# Patient Record
Sex: MALE | Race: WHITE | HISPANIC OR LATINO | Employment: UNEMPLOYED | URBAN - METROPOLITAN AREA
[De-identification: names, ages, dates, MRNs, and addresses within clinical notes are randomized per-mention and may not be internally consistent; named-entity substitution may affect disease eponyms.]

---

## 2020-10-19 ENCOUNTER — OFFICE VISIT (OUTPATIENT)
Dept: FAMILY MEDICINE CLINIC | Facility: CLINIC | Age: 55
End: 2020-10-19

## 2020-10-19 ENCOUNTER — LAB (OUTPATIENT)
Dept: LAB | Facility: HOSPITAL | Age: 55
End: 2020-10-19
Payer: MEDICARE

## 2020-10-19 VITALS
OXYGEN SATURATION: 97 % | DIASTOLIC BLOOD PRESSURE: 86 MMHG | RESPIRATION RATE: 18 BRPM | BODY MASS INDEX: 35.4 KG/M2 | WEIGHT: 239 LBS | HEIGHT: 69 IN | TEMPERATURE: 98.6 F | SYSTOLIC BLOOD PRESSURE: 150 MMHG | HEART RATE: 74 BPM

## 2020-10-19 DIAGNOSIS — G89.29 CHRONIC THORACIC BACK PAIN, UNSPECIFIED BACK PAIN LATERALITY: ICD-10-CM

## 2020-10-19 DIAGNOSIS — Z12.11 ENCOUNTER FOR SCREENING COLONOSCOPY: ICD-10-CM

## 2020-10-19 DIAGNOSIS — I10 ESSENTIAL HYPERTENSION: ICD-10-CM

## 2020-10-19 DIAGNOSIS — M54.6 CHRONIC THORACIC BACK PAIN, UNSPECIFIED BACK PAIN LATERALITY: ICD-10-CM

## 2020-10-19 DIAGNOSIS — Z00.00 ENCOUNTER FOR MEDICAL EXAMINATION TO ESTABLISH CARE: ICD-10-CM

## 2020-10-19 DIAGNOSIS — G47.33 OSA (OBSTRUCTIVE SLEEP APNEA): ICD-10-CM

## 2020-10-19 DIAGNOSIS — Z00.00 ENCOUNTER FOR MEDICAL EXAMINATION TO ESTABLISH CARE: Primary | ICD-10-CM

## 2020-10-19 LAB
ALBUMIN SERPL BCP-MCNC: 4.6 G/DL (ref 3–5.2)
ALP SERPL-CCNC: 78 U/L (ref 43–122)
ALT SERPL W P-5'-P-CCNC: 24 U/L (ref 9–52)
ANION GAP SERPL CALCULATED.3IONS-SCNC: 8 MMOL/L (ref 5–14)
AST SERPL W P-5'-P-CCNC: 23 U/L (ref 17–59)
BASOPHILS # BLD AUTO: 0.1 THOUSANDS/ΜL (ref 0–0.1)
BASOPHILS NFR BLD AUTO: 1 % (ref 0–1)
BILIRUB SERPL-MCNC: 0.6 MG/DL
BUN SERPL-MCNC: 11 MG/DL (ref 5–25)
CALCIUM SERPL-MCNC: 9.8 MG/DL (ref 8.4–10.2)
CHLORIDE SERPL-SCNC: 102 MMOL/L (ref 97–108)
CHOLEST SERPL-MCNC: 272 MG/DL
CO2 SERPL-SCNC: 30 MMOL/L (ref 22–30)
CREAT SERPL-MCNC: 0.77 MG/DL (ref 0.7–1.5)
CREAT UR-MCNC: 239 MG/DL
EOSINOPHIL # BLD AUTO: 0.1 THOUSAND/ΜL (ref 0–0.4)
EOSINOPHIL NFR BLD AUTO: 1 % (ref 0–6)
ERYTHROCYTE [DISTWIDTH] IN BLOOD BY AUTOMATED COUNT: 15.1 %
EST. AVERAGE GLUCOSE BLD GHB EST-MCNC: 117 MG/DL
GFR SERPL CREATININE-BSD FRML MDRD: 101 ML/MIN/1.73SQ M
GLUCOSE P FAST SERPL-MCNC: 107 MG/DL (ref 70–99)
HBA1C MFR BLD: 5.7 %
HCT VFR BLD AUTO: 48.2 % (ref 41–53)
HCV AB SER QL: NORMAL
HDLC SERPL-MCNC: 38 MG/DL
HGB BLD-MCNC: 15.7 G/DL (ref 13.5–17.5)
LDLC SERPL CALC-MCNC: 197 MG/DL
LYMPHOCYTES # BLD AUTO: 1.8 THOUSANDS/ΜL (ref 0.5–4)
LYMPHOCYTES NFR BLD AUTO: 20 % (ref 25–45)
MCH RBC QN AUTO: 26.7 PG (ref 26–34)
MCHC RBC AUTO-ENTMCNC: 32.6 G/DL (ref 31–36)
MCV RBC AUTO: 82 FL (ref 80–100)
MICROALBUMIN UR-MCNC: 192 MG/L (ref 0–20)
MICROALBUMIN/CREAT 24H UR: 80 MG/G CREATININE (ref 0–30)
MONOCYTES # BLD AUTO: 0.6 THOUSAND/ΜL (ref 0.2–0.9)
MONOCYTES NFR BLD AUTO: 7 % (ref 1–10)
NEUTROPHILS # BLD AUTO: 6.3 THOUSANDS/ΜL (ref 1.8–7.8)
NEUTS SEG NFR BLD AUTO: 72 % (ref 45–65)
NONHDLC SERPL-MCNC: 234 MG/DL
PLATELET # BLD AUTO: 269 THOUSANDS/UL (ref 150–450)
PMV BLD AUTO: 9.2 FL (ref 8.9–12.7)
POTASSIUM SERPL-SCNC: 4.4 MMOL/L (ref 3.6–5)
PROT SERPL-MCNC: 8.3 G/DL (ref 5.9–8.4)
PSA SERPL-MCNC: 0.4 NG/ML (ref 0–4)
RBC # BLD AUTO: 5.88 MILLION/UL (ref 4.5–5.9)
SODIUM SERPL-SCNC: 140 MMOL/L (ref 137–147)
TRIGL SERPL-MCNC: 183 MG/DL
WBC # BLD AUTO: 8.8 THOUSAND/UL (ref 4.5–11)

## 2020-10-19 PROCEDURE — 3008F BODY MASS INDEX DOCD: CPT | Performed by: FAMILY MEDICINE

## 2020-10-19 PROCEDURE — 82043 UR ALBUMIN QUANTITATIVE: CPT | Performed by: FAMILY MEDICINE

## 2020-10-19 PROCEDURE — 99204 OFFICE O/P NEW MOD 45 MIN: CPT | Performed by: FAMILY MEDICINE

## 2020-10-19 PROCEDURE — 3725F SCREEN DEPRESSION PERFORMED: CPT | Performed by: FAMILY MEDICINE

## 2020-10-19 PROCEDURE — G0103 PSA SCREENING: HCPCS

## 2020-10-19 PROCEDURE — 87389 HIV-1 AG W/HIV-1&-2 AB AG IA: CPT

## 2020-10-19 PROCEDURE — 80061 LIPID PANEL: CPT

## 2020-10-19 PROCEDURE — 36415 COLL VENOUS BLD VENIPUNCTURE: CPT

## 2020-10-19 PROCEDURE — 86803 HEPATITIS C AB TEST: CPT

## 2020-10-19 PROCEDURE — 82570 ASSAY OF URINE CREATININE: CPT | Performed by: FAMILY MEDICINE

## 2020-10-19 PROCEDURE — 83036 HEMOGLOBIN GLYCOSYLATED A1C: CPT

## 2020-10-19 PROCEDURE — 80053 COMPREHEN METABOLIC PANEL: CPT

## 2020-10-19 PROCEDURE — 85025 COMPLETE CBC W/AUTO DIFF WBC: CPT

## 2020-10-19 RX ORDER — AMLODIPINE BESYLATE 10 MG/1
10 TABLET ORAL DAILY
COMMUNITY
End: 2020-10-19

## 2020-10-19 RX ORDER — LOSARTAN POTASSIUM 25 MG/1
25 TABLET ORAL DAILY
COMMUNITY
End: 2020-10-19

## 2020-10-19 RX ORDER — AMLODIPINE BESYLATE 10 MG/1
10 TABLET ORAL DAILY
Qty: 90 TABLET | Refills: 0 | Status: SHIPPED | OUTPATIENT
Start: 2020-10-19 | End: 2020-12-28 | Stop reason: SDUPTHER

## 2020-10-19 RX ORDER — LOSARTAN POTASSIUM 25 MG/1
25 TABLET ORAL DAILY
Qty: 90 TABLET | Refills: 0 | Status: SHIPPED | OUTPATIENT
Start: 2020-10-19 | End: 2020-12-28 | Stop reason: SDUPTHER

## 2020-10-20 ENCOUNTER — NURSE TRIAGE (OUTPATIENT)
Dept: PHYSICAL THERAPY | Facility: OTHER | Age: 55
End: 2020-10-20

## 2020-10-20 DIAGNOSIS — G89.29 CHRONIC BILATERAL THORACIC BACK PAIN: Primary | ICD-10-CM

## 2020-10-20 DIAGNOSIS — M54.6 CHRONIC BILATERAL THORACIC BACK PAIN: Primary | ICD-10-CM

## 2020-10-21 LAB — HIV 1+2 AB+HIV1 P24 AG SERPL QL IA: NORMAL

## 2020-10-22 ENCOUNTER — TELEPHONE (OUTPATIENT)
Dept: PHYSICAL THERAPY | Facility: OTHER | Age: 55
End: 2020-10-22

## 2020-11-11 ENCOUNTER — OFFICE VISIT (OUTPATIENT)
Dept: FAMILY MEDICINE CLINIC | Facility: CLINIC | Age: 55
End: 2020-11-11

## 2020-11-11 VITALS
DIASTOLIC BLOOD PRESSURE: 80 MMHG | TEMPERATURE: 98.3 F | OXYGEN SATURATION: 95 % | HEART RATE: 91 BPM | WEIGHT: 239 LBS | BODY MASS INDEX: 35.4 KG/M2 | RESPIRATION RATE: 16 BRPM | SYSTOLIC BLOOD PRESSURE: 160 MMHG | HEIGHT: 69 IN

## 2020-11-11 DIAGNOSIS — E66.9 CLASS 2 OBESITY WITH BODY MASS INDEX (BMI) OF 35.0 TO 35.9 IN ADULT, UNSPECIFIED OBESITY TYPE, UNSPECIFIED WHETHER SERIOUS COMORBIDITY PRESENT: ICD-10-CM

## 2020-11-11 DIAGNOSIS — E78.2 MIXED HYPERLIPIDEMIA: Primary | ICD-10-CM

## 2020-11-11 DIAGNOSIS — G47.33 OSA (OBSTRUCTIVE SLEEP APNEA): ICD-10-CM

## 2020-11-11 DIAGNOSIS — I10 ESSENTIAL HYPERTENSION: ICD-10-CM

## 2020-11-11 PROBLEM — E66.812 CLASS 2 OBESITY IN ADULT: Status: ACTIVE | Noted: 2020-11-11

## 2020-11-11 PROCEDURE — 3079F DIAST BP 80-89 MM HG: CPT | Performed by: FAMILY MEDICINE

## 2020-11-11 PROCEDURE — 3008F BODY MASS INDEX DOCD: CPT | Performed by: FAMILY MEDICINE

## 2020-11-11 PROCEDURE — 99213 OFFICE O/P EST LOW 20 MIN: CPT | Performed by: FAMILY MEDICINE

## 2020-11-11 PROCEDURE — 3077F SYST BP >= 140 MM HG: CPT | Performed by: FAMILY MEDICINE

## 2020-11-11 RX ORDER — ATORVASTATIN CALCIUM 40 MG/1
40 TABLET, FILM COATED ORAL DAILY
Qty: 90 TABLET | Refills: 1 | Status: SHIPPED | OUTPATIENT
Start: 2020-11-11 | End: 2020-12-28 | Stop reason: SDUPTHER

## 2020-11-12 ENCOUNTER — TELEPHONE (OUTPATIENT)
Dept: SLEEP CENTER | Facility: CLINIC | Age: 55
End: 2020-11-12

## 2020-11-30 ENCOUNTER — TELEPHONE (OUTPATIENT)
Dept: FAMILY MEDICINE CLINIC | Facility: CLINIC | Age: 55
End: 2020-11-30

## 2020-12-11 ENCOUNTER — TELEPHONE (OUTPATIENT)
Dept: FAMILY MEDICINE CLINIC | Facility: CLINIC | Age: 55
End: 2020-12-11

## 2020-12-11 NOTE — TELEPHONE ENCOUNTER
Pt states that he spoke to doctor on his last visit about seeing someone in gastro and as well getting physical therapy for his lower back pain, I didn't see anything in his notes regarding either       Please advise

## 2020-12-12 ENCOUNTER — TELEPHONE (OUTPATIENT)
Dept: FAMILY MEDICINE CLINIC | Facility: CLINIC | Age: 55
End: 2020-12-12

## 2020-12-22 ENCOUNTER — TELEPHONE (OUTPATIENT)
Dept: SLEEP CENTER | Facility: CLINIC | Age: 55
End: 2020-12-22

## 2020-12-22 ENCOUNTER — OFFICE VISIT (OUTPATIENT)
Dept: SLEEP CENTER | Facility: CLINIC | Age: 55
End: 2020-12-22
Payer: MEDICARE

## 2020-12-22 VITALS
HEIGHT: 69 IN | HEART RATE: 69 BPM | BODY MASS INDEX: 35.76 KG/M2 | DIASTOLIC BLOOD PRESSURE: 82 MMHG | SYSTOLIC BLOOD PRESSURE: 136 MMHG | WEIGHT: 241.4 LBS

## 2020-12-22 DIAGNOSIS — G47.00 INSOMNIA, UNSPECIFIED TYPE: ICD-10-CM

## 2020-12-22 DIAGNOSIS — G47.33 OSA (OBSTRUCTIVE SLEEP APNEA): Primary | ICD-10-CM

## 2020-12-22 PROCEDURE — 99244 OFF/OP CNSLTJ NEW/EST MOD 40: CPT | Performed by: NURSE PRACTITIONER

## 2020-12-22 RX ORDER — ZOLPIDEM TARTRATE 10 MG/1
TABLET ORAL
Qty: 1 TABLET | Refills: 1 | Status: SHIPPED | OUTPATIENT
Start: 2020-12-22 | End: 2021-04-19 | Stop reason: ALTCHOICE

## 2020-12-28 ENCOUNTER — OFFICE VISIT (OUTPATIENT)
Dept: FAMILY MEDICINE CLINIC | Facility: CLINIC | Age: 55
End: 2020-12-28

## 2020-12-28 VITALS
BODY MASS INDEX: 35.99 KG/M2 | WEIGHT: 243 LBS | SYSTOLIC BLOOD PRESSURE: 140 MMHG | HEART RATE: 86 BPM | HEIGHT: 69 IN | OXYGEN SATURATION: 97 % | DIASTOLIC BLOOD PRESSURE: 74 MMHG | RESPIRATION RATE: 16 BRPM | TEMPERATURE: 98 F

## 2020-12-28 DIAGNOSIS — I10 ESSENTIAL HYPERTENSION: ICD-10-CM

## 2020-12-28 DIAGNOSIS — G89.29 CHRONIC THORACIC BACK PAIN, UNSPECIFIED BACK PAIN LATERALITY: ICD-10-CM

## 2020-12-28 DIAGNOSIS — E78.2 MIXED HYPERLIPIDEMIA: ICD-10-CM

## 2020-12-28 DIAGNOSIS — K21.9 GASTROESOPHAGEAL REFLUX DISEASE, UNSPECIFIED WHETHER ESOPHAGITIS PRESENT: Primary | ICD-10-CM

## 2020-12-28 DIAGNOSIS — H81.10 BENIGN PAROXYSMAL POSITIONAL VERTIGO, UNSPECIFIED LATERALITY: ICD-10-CM

## 2020-12-28 DIAGNOSIS — M54.6 CHRONIC THORACIC BACK PAIN, UNSPECIFIED BACK PAIN LATERALITY: ICD-10-CM

## 2020-12-28 PROCEDURE — 3078F DIAST BP <80 MM HG: CPT | Performed by: PHYSICIAN ASSISTANT

## 2020-12-28 PROCEDURE — 99214 OFFICE O/P EST MOD 30 MIN: CPT | Performed by: PHYSICIAN ASSISTANT

## 2020-12-28 PROCEDURE — 3077F SYST BP >= 140 MM HG: CPT | Performed by: PHYSICIAN ASSISTANT

## 2020-12-28 PROCEDURE — 3008F BODY MASS INDEX DOCD: CPT | Performed by: PHYSICIAN ASSISTANT

## 2020-12-28 RX ORDER — ATORVASTATIN CALCIUM 40 MG/1
40 TABLET, FILM COATED ORAL DAILY
Qty: 90 TABLET | Refills: 1 | Status: SHIPPED | OUTPATIENT
Start: 2020-12-28

## 2020-12-28 RX ORDER — LOSARTAN POTASSIUM 25 MG/1
25 TABLET ORAL DAILY
Qty: 90 TABLET | Refills: 1 | Status: SHIPPED | OUTPATIENT
Start: 2020-12-28 | End: 2021-01-21 | Stop reason: SDUPTHER

## 2020-12-28 RX ORDER — AMLODIPINE BESYLATE 10 MG/1
10 TABLET ORAL DAILY
Qty: 90 TABLET | Refills: 1 | Status: SHIPPED | OUTPATIENT
Start: 2020-12-28 | End: 2021-01-21 | Stop reason: SDUPTHER

## 2020-12-28 RX ORDER — ESOMEPRAZOLE MAGNESIUM 40 MG/1
40 CAPSULE, DELAYED RELEASE ORAL DAILY
Qty: 90 CAPSULE | Refills: 1 | Status: SHIPPED | OUTPATIENT
Start: 2020-12-28 | End: 2021-09-29

## 2020-12-29 ENCOUNTER — TELEPHONE (OUTPATIENT)
Dept: GASTROENTEROLOGY | Facility: MEDICAL CENTER | Age: 55
End: 2020-12-29

## 2020-12-30 ENCOUNTER — HOSPITAL ENCOUNTER (OUTPATIENT)
Dept: SLEEP CENTER | Facility: CLINIC | Age: 55
Discharge: HOME | End: 2020-12-30
Payer: COMMERCIAL

## 2020-12-30 DIAGNOSIS — G47.33 OSA (OBSTRUCTIVE SLEEP APNEA): ICD-10-CM

## 2020-12-30 PROCEDURE — 95810 POLYSOM 6/> YRS 4/> PARAM: CPT

## 2020-12-31 ENCOUNTER — TELEPHONE (OUTPATIENT)
Dept: GASTROENTEROLOGY | Facility: MEDICAL CENTER | Age: 55
End: 2020-12-31

## 2020-12-31 ENCOUNTER — TELEMEDICINE (OUTPATIENT)
Dept: GASTROENTEROLOGY | Facility: MEDICAL CENTER | Age: 55
End: 2020-12-31
Payer: COMMERCIAL

## 2020-12-31 DIAGNOSIS — K21.9 GASTROESOPHAGEAL REFLUX DISEASE, UNSPECIFIED WHETHER ESOPHAGITIS PRESENT: Primary | ICD-10-CM

## 2020-12-31 DIAGNOSIS — Z86.010 PERSONAL HISTORY OF COLONIC POLYPS: ICD-10-CM

## 2020-12-31 DIAGNOSIS — A04.8 H. PYLORI INFECTION: ICD-10-CM

## 2020-12-31 PROCEDURE — 99243 OFF/OP CNSLTJ NEW/EST LOW 30: CPT | Performed by: INTERNAL MEDICINE

## 2020-12-31 NOTE — PROGRESS NOTES
Sleep Study Documentation    Pre-Sleep Study       Sleep testing procedure explained to patient:YES    Patient napped prior to study:NO    [address] [place] worker after 12PM   Caffeine use:NO    Alcohol:Dayshift workers after 5PM: Alcohol use:NO    Typical day for patient:YES       Study Documentation    Sleep Study Indications:  Snoring, nocturnal choking, EDS, BMI>30, unrefreshing sleep, witnessed apneas, witnessed gasping  Sleep Study: Diagnostic   Snore: Moderate  Supplemental O2: no    O2 flow rate (L/min) range 0  O2 flow rate (L/min) final 0  Minimum SaO2  72 %  Baseline SaO2  95 3 %            EKG abnormalities: no     EEG abnormalities: yes:  ECG artifact throughout study  Averaged Ms  Sleep Study Recorded < 2 hours: N/A    Sleep Study Recorded > 2 hours but incomplete study: N/A    Sleep Study Recorded 6 hours but no sleep obtained: YES          Post-Sleep Study    Medication used at bedtime or during sleep study:YES prescription sleep aid    Patient reports time it took to fall asleep:30 to 60 minutes    Patient reports waking up during study:3 or more times  Patient reports returning to sleep without difficulty  Patient reports sleeping 2 to 4 hours without dreaming  Patient reports sleep during study:typical    Patient rated sleepiness: Somewhat sleepy or tired    PAP treatment:no

## 2020-12-31 NOTE — Clinical Note
Hi,    He needs an EGD and colonoscopy (miralax and dulcolax prep)  Perhaps with me at [facility] on 1/11?     Thank you,  Aggie Mcneill

## 2020-12-31 NOTE — PROGRESS NOTES
Virtual Regular Visit      Assessment/Plan:  The patient is a 62 yo man with GERD and prior H pylori here with worsening GERD  He was treated for H pylori but never tested for eradication  His symptoms returned soon after he completed his therapy, almost 3 years ago  His GERD has been getting worse  He is overdue for a screening colonoscopy (had polyps, and he was told to repeat it after 5 years, and he should have had his repeat colonoscopy almost 3 years ago)  He may have ongoing H pylori  There is also a concern for baldwin's given his ongoing symptoms  He is due for repeat colonoscopy  Blood work with normal CBC, CMP  Available labs and imaging reviewed  Problem List Items Addressed This Visit        Digestive    Gastroesophageal reflux disease - Primary    Relevant Orders    EGD      Other Visit Diagnoses     H  pylori infection        Relevant Orders    EGD    Personal history of colonic polyps        Relevant Orders    Colonoscopy        Plan for EGD to assess H pylori, baldwin's, worsening GERD  Colonoscopy for screening as he is due  Take Nexium daily  Avoid trigger foods         Reason for visit is   Chief Complaint   Patient presents with    Virtual Regular Visit        Encounter provider Guido Henning MD    Provider located at [address] [address]      Recent Visits  Date Type Provider Dept   12/31/20 Telephone [address] [place] [address]   12/31/20 Telemedicine Guido Henning MD [facility]   12/29/20 Telephone Leroy Leyva [facility]   Showing recent visits within past 7 days and meeting all other requirements     Future Appointments  No visits were found meeting these conditions  Showing future appointments within next 150 days and meeting all other requirements        The patient was identified by name and date of birth   Chase Garcia Jada was informed that this is a telemedicine visit and that the visit is being conducted through [facility] and patient was informed that this is not a secure, HIPAA-compliant platform  He agrees to proceed     My office door was closed  No one else was in the room  He acknowledged consent and understanding of privacy and security of the video platform  The patient has agreed to participate and understands they can discontinue the visit at any time  Patient is aware this is a billable service  Referred by Dr Julián Mohan for GERD    Subjective  Verenice Lin is a 63 y o  male who presents for GERD and screening for CRC   He has been having acid reflux  2-3 years ago he was diagnosed with a bacteria (possible H pylori)  He was treated  Never checked to make sure it was gone  The GERD improved but then it came back  Last EGD 3 years ago, last colonoscopy 7-8 years ago (he had polyps and was told to repeat it in 5 years)  Reflux long-standing  It occurs almost every day, especially with spicy foods  No dysphagia, odynophagia, nausea, vomiting  No abdominal pain, diarrhea, constipation, BRBPR, black stools, weight loss  He is on Nexium 40mg every 2-3 days  No GI related FH        Past Medical History:   Diagnosis Date    Hypertension        Past Surgical History:   Procedure Laterality Date    BACK SURGERY  2009       Current Outpatient Medications   Medication Sig Dispense Refill    amLODIPine (NORVASC) 10 mg tablet Take 1 tablet (10 mg total) by mouth daily 90 tablet 1    atorvastatin (LIPITOR) 40 mg tablet Take 1 tablet (40 mg total) by mouth daily 90 tablet 1    esomeprazole (NexIUM) 40 MG capsule Take 1 capsule (40 mg total) by mouth daily 90 capsule 1    losartan (COZAAR) 25 mg tablet Take 1 tablet (25 mg total) by mouth daily 90 tablet 1    zolpidem (AMBIEN) 10 mg tablet Take one tablet at lights out on night of sleep study 1 tablet 1     No current facility-administered medications for this visit  No Known Allergies    REVIEW OF SYSTEMS:  10 point ROS reviewed and negative, except as above      PHYSICAL EXAMINATION:  Appearance and vitals taken from home devices    General Appearance:   Alert, cooperative, no distress   HEENT:  Normocephalic, atraumatic, anicteric  Neck supple, symmetrical, trachea midline  Lungs:   Equal chest rise and unlabored breathing, normal effort, no coughing  Cardiovascular:   No visualized JVD  Abdomen:   No abdominal distension  Skin:   No jaundice, rashes, or lesions  Musculoskeletal:   Normal range of motion visualized  Psych:  Normal affect and normal insight  Neuro:  Alert and appropriate  I spent 20 minutes directly with the patient during this visit      VIRTUAL VISIT DISCLAIMER    Ken Paulina acknowledges that he has consented to an online visit or consultation  He understands that the online visit is based solely on information provided by him, and that, in the absence of a face-to-face physical evaluation by the physician, the diagnosis he receives is both limited and provisional in terms of accuracy and completeness  This is not intended to replace a full medical face-to-face evaluation by the physician  Ken Gallardo understands and accepts these terms

## 2020-12-31 NOTE — H&P (VIEW-ONLY)
Virtual Regular Visit      Assessment/Plan:  The patient is a 64 yo man with GERD and prior H pylori here with worsening GERD  He was treated for H pylori but never tested for eradication  His symptoms returned soon after he completed his therapy, almost 3 years ago  His GERD has been getting worse  He is overdue for a screening colonoscopy (had polyps, and he was told to repeat it after 5 years, and he should have had his repeat colonoscopy almost 3 years ago)  He may have ongoing H pylori  There is also a concern for baldwin's given his ongoing symptoms  He is due for repeat colonoscopy  Blood work with normal CBC, CMP  Available labs and imaging reviewed  Problem List Items Addressed This Visit        Digestive    Gastroesophageal reflux disease - Primary    Relevant Orders    EGD      Other Visit Diagnoses     H  pylori infection        Relevant Orders    EGD    Personal history of colonic polyps        Relevant Orders    Colonoscopy        Plan for EGD to assess H pylori, baldwin's, worsening GERD  Colonoscopy for screening as he is due  Take Nexium daily  Avoid trigger foods         Reason for visit is   Chief Complaint   Patient presents with    Virtual Regular Visit        Encounter provider Man White MD    Provider located at [place] [phone]      Recent Visits  Date Type Provider Dept   12/31/20 Telephone [address] [place] [address]   12/31/20 Telemedicine MD [facility] [place] [facility]   12/29/20 Telephone Ortiz Davis [facility] [place] [facility]   Showing recent visits within past 7 days and meeting all other requirements     Future Appointments  No visits were found meeting these conditions  Showing future appointments within next 150 days and meeting all other requirements        The patient was identified by name and date of birth   Carley Calero Arletteroni was informed that this is a telemedicine visit and that the visit is being conducted through [facility] and patient was informed that this is not a secure, HIPAA-compliant platform  He agrees to proceed     My office door was closed  No one else was in the room  He acknowledged consent and understanding of privacy and security of the video platform  The patient has agreed to participate and understands they can discontinue the visit at any time  Patient is aware this is a billable service  Referred by Dr Mikayla Keller for GERD    Subjective  Brett James is a 63 y o  male who presents for GERD and screening for CRC   He has been having acid reflux  2-3 years ago he was diagnosed with a bacteria (possible H pylori)  He was treated  Never checked to make sure it was gone  The GERD improved but then it came back  Last EGD 3 years ago, last colonoscopy 7-8 years ago (he had polyps and was told to repeat it in 5 years)  Reflux long-standing  It occurs almost every day, especially with spicy foods  No dysphagia, odynophagia, nausea, vomiting  No abdominal pain, diarrhea, constipation, BRBPR, black stools, weight loss  He is on Nexium 40mg every 2-3 days  No GI related FH        Past Medical History:   Diagnosis Date    Hypertension        Past Surgical History:   Procedure Laterality Date    BACK SURGERY  2009       Current Outpatient Medications   Medication Sig Dispense Refill    amLODIPine (NORVASC) 10 mg tablet Take 1 tablet (10 mg total) by mouth daily 90 tablet 1    atorvastatin (LIPITOR) 40 mg tablet Take 1 tablet (40 mg total) by mouth daily 90 tablet 1    esomeprazole (NexIUM) 40 MG capsule Take 1 capsule (40 mg total) by mouth daily 90 capsule 1    losartan (COZAAR) 25 mg tablet Take 1 tablet (25 mg total) by mouth daily 90 tablet 1    zolpidem (AMBIEN) 10 mg tablet Take one tablet at lights out on night of sleep study 1 tablet 1     No current facility-administered medications for this visit  No Known Allergies    REVIEW OF SYSTEMS:  10 point ROS reviewed and negative, except as above      PHYSICAL EXAMINATION:  Appearance and vitals taken from home devices    General Appearance:   Alert, cooperative, no distress   HEENT:  Normocephalic, atraumatic, anicteric  Neck supple, symmetrical, trachea midline  Lungs:   Equal chest rise and unlabored breathing, normal effort, no coughing  Cardiovascular:   No visualized JVD  Abdomen:   No abdominal distension  Skin:   No jaundice, rashes, or lesions  Musculoskeletal:   Normal range of motion visualized  Psych:  Normal affect and normal insight  Neuro:  Alert and appropriate  I spent 20 minutes directly with the patient during this visit      VIRTUAL VISIT DISCLAIMER    Ozzy Elizalde acknowledges that he has consented to an online visit or consultation  He understands that the online visit is based solely on information provided by him, and that, in the absence of a face-to-face physical evaluation by the physician, the diagnosis he receives is both limited and provisional in terms of accuracy and completeness  This is not intended to replace a full medical face-to-face evaluation by the physician  Ozzy Elizalde understands and accepts these terms

## 2021-01-04 ENCOUNTER — TELEPHONE (OUTPATIENT)
Dept: SLEEP CENTER | Facility: CLINIC | Age: 56
End: 2021-01-04

## 2021-01-04 DIAGNOSIS — Z20.822 ENCOUNTER FOR PREPROCEDURE SCREENING LABORATORY TESTING FOR COVID-19: Primary | ICD-10-CM

## 2021-01-04 DIAGNOSIS — Z01.812 ENCOUNTER FOR PREPROCEDURE SCREENING LABORATORY TESTING FOR COVID-19: Primary | ICD-10-CM

## 2021-01-04 NOTE — TELEPHONE ENCOUNTER
[facility]             Severe LOUIE with hypoxia   Proceed with CPAP titration study, as planned  Spoke with patient, advised sleep study reveals severe LOUIE with hypoxia, recommend titration study  Already scheduled 2/16/2021 in [place]  Instructions reviewed, patient verbalizes understanding  Reminded patient he will need to have COVID testing completed 2/5/2021  Patient states he did not receive letter  COVID letter resent to patient  COVID testing order placed

## 2021-01-05 ENCOUNTER — ANESTHESIA EVENT (OUTPATIENT)
Dept: GASTROENTEROLOGY | Facility: MEDICAL CENTER | Age: 56
End: 2021-01-05

## 2021-01-11 ENCOUNTER — HOSPITAL ENCOUNTER (OUTPATIENT)
Dept: GASTROENTEROLOGY | Facility: MEDICAL CENTER | Age: 56
Setting detail: OUTPATIENT SURGERY
Discharge: HOME | End: 2021-01-11
Attending: INTERNAL MEDICINE | Admitting: INTERNAL MEDICINE
Payer: COMMERCIAL

## 2021-01-11 ENCOUNTER — ANESTHESIA (OUTPATIENT)
Dept: GASTROENTEROLOGY | Facility: MEDICAL CENTER | Age: 56
End: 2021-01-11

## 2021-01-11 VITALS — HEART RATE: 89 BPM

## 2021-01-11 VITALS
SYSTOLIC BLOOD PRESSURE: 210 MMHG | DIASTOLIC BLOOD PRESSURE: 95 MMHG | HEART RATE: 85 BPM | TEMPERATURE: 96.7 F | RESPIRATION RATE: 20 BRPM | OXYGEN SATURATION: 98 %

## 2021-01-11 DIAGNOSIS — K21.9 GASTROESOPHAGEAL REFLUX DISEASE, UNSPECIFIED WHETHER ESOPHAGITIS PRESENT: ICD-10-CM

## 2021-01-11 DIAGNOSIS — Z86.010 PERSONAL HISTORY OF COLONIC POLYPS: ICD-10-CM

## 2021-01-11 DIAGNOSIS — A04.8 H. PYLORI INFECTION: ICD-10-CM

## 2021-01-11 PROCEDURE — 43239 EGD BIOPSY SINGLE/MULTIPLE: CPT | Performed by: INTERNAL MEDICINE

## 2021-01-11 PROCEDURE — 45385 COLONOSCOPY W/LESION REMOVAL: CPT | Performed by: INTERNAL MEDICINE

## 2021-01-11 PROCEDURE — 45380 COLONOSCOPY AND BIOPSY: CPT | Performed by: INTERNAL MEDICINE

## 2021-01-11 PROCEDURE — 88305 TISSUE EXAM BY PATHOLOGIST: CPT | Performed by: PATHOLOGY

## 2021-01-11 PROCEDURE — 88342 IMHCHEM/IMCYTCHM 1ST ANTB: CPT | Performed by: PATHOLOGY

## 2021-01-11 RX ORDER — SODIUM CHLORIDE 9 MG/ML
125 INJECTION, SOLUTION INTRAVENOUS CONTINUOUS
Status: DISCONTINUED | OUTPATIENT
Start: 2021-01-11 | End: 2021-01-15 | Stop reason: HOSPADM

## 2021-01-11 RX ORDER — ONDANSETRON 2 MG/ML
4 INJECTION INTRAMUSCULAR; INTRAVENOUS ONCE AS NEEDED
Status: DISCONTINUED | OUTPATIENT
Start: 2021-01-11 | End: 2021-01-15 | Stop reason: HOSPADM

## 2021-01-11 RX ORDER — PROPOFOL 10 MG/ML
INJECTION, EMULSION INTRAVENOUS AS NEEDED
Status: DISCONTINUED | OUTPATIENT
Start: 2021-01-11 | End: 2021-01-11

## 2021-01-11 RX ADMIN — PROPOFOL 150 MG: 10 INJECTION, EMULSION INTRAVENOUS at 10:49

## 2021-01-11 RX ADMIN — PROPOFOL 50 MG: 10 INJECTION, EMULSION INTRAVENOUS at 11:24

## 2021-01-11 RX ADMIN — PROPOFOL 50 MG: 10 INJECTION, EMULSION INTRAVENOUS at 11:15

## 2021-01-11 RX ADMIN — PROPOFOL 50 MG: 10 INJECTION, EMULSION INTRAVENOUS at 10:35

## 2021-01-11 RX ADMIN — PROPOFOL 50 MG: 10 INJECTION, EMULSION INTRAVENOUS at 11:10

## 2021-01-11 RX ADMIN — PROPOFOL 50 MG: 10 INJECTION, EMULSION INTRAVENOUS at 10:55

## 2021-01-11 RX ADMIN — PROPOFOL 50 MG: 10 INJECTION, EMULSION INTRAVENOUS at 11:08

## 2021-01-11 RX ADMIN — PROPOFOL 150 MG: 10 INJECTION, EMULSION INTRAVENOUS at 10:52

## 2021-01-11 RX ADMIN — PROPOFOL 150 MG: 10 INJECTION, EMULSION INTRAVENOUS at 11:03

## 2021-01-11 RX ADMIN — SODIUM CHLORIDE 125 ML/HR: 0.9 INJECTION, SOLUTION INTRAVENOUS at 10:41

## 2021-01-11 RX ADMIN — PROPOFOL 50 MG: 10 INJECTION, EMULSION INTRAVENOUS at 11:31

## 2021-01-11 NOTE — ANESTHESIA PREPROCEDURE EVALUATION
Procedure:  COLONOSCOPY  EGD    Relevant Problems   CARDIO   (+) Essential hypertension   (+) Mixed hyperlipidemia      GI/HEPATIC   (+) Gastroesophageal reflux disease      MUSCULOSKELETAL   (+) Chronic thoracic back pain      NEURO/PSYCH   (+) Chronic thoracic back pain      PULMONARY   (+) LOUIE (obstructive sleep apnea)      Other   (+) Class 2 obesity in adult        Physical Exam    Airway    Mallampati score: II  TM Distance: >3 FB  Neck ROM: full     Dental   No notable dental hx     Cardiovascular  Rhythm: regular, Rate: normal,     Pulmonary  Breath sounds clear to auscultation,     Other Findings        Anesthesia Plan  ASA Score- 2     Anesthesia Type- IV sedation with anesthesia with ASA Monitors  Additional Monitors:   Airway Plan:           Plan Factors-Exercise tolerance (METS): >4 METS  Chart reviewed  Patient summary reviewed  Patient is not a current smoker  Patient not instructed to abstain from smoking on day of procedure  Patient did not smoke on day of surgery  Induction- intravenous  Postoperative Plan-     Informed Consent- Anesthetic plan and risks discussed with patient

## 2021-01-11 NOTE — DISCHARGE INSTRUCTIONS
Upper Endoscopy   WHAT YOU NEED TO KNOW:   An upper endoscopy is also called an upper gastrointestinal (GI) endoscopy, or an esophagogastroduodenoscopy (EGD)  You may feel bloated, gassy, or have some abdominal discomfort after your procedure  Your throat may be sore for 24 to 36 hours  You may burp or pass gas from air that is still inside your body  DISCHARGE INSTRUCTIONS:   Call 911 for any of the following:   · You have sudden chest pain or trouble breathing  Seek care immediately if:   · You feel dizzy or faint  · You have trouble swallowing  · Your bowel movements are very dark or black  · Your abdomen is hard and firm and you have severe pain  · You vomit blood  Contact your healthcare provider if:   · You feel full or bloated and cannot burp or pass gas  · You have not had a bowel movement for 3 days after your procedure  · You have neck pain  · You have a fever or chills  · You have nausea or are vomiting  · You have a rash or hives  · You have questions or concerns about your endoscopy  Relieve a sore throat:  Suck on throat lozenges or crushed ice  Gargle with a small amount of warm salt water  Mix 1 teaspoon of salt and 1 cup of warm water to make salt water  Relieve gas and discomfort from bloating:  Lie on your right side with a heating pad on your abdomen  Take short walks to help pass gas  Eat small meals until bloating is relieved  Rest after your procedure: You have been given medicine to relax you  Do not  drive or make important decisions until the day after your procedure  Return to your normal activity as directed  You can usually return to work the day after your procedure  Follow up with your healthcare provider as directed:  Write down your questions so you remember to ask them during your visits     © 2017 [address] is for End User's use only and may not be sold, redistributed or otherwise used for commercial purposes  All illustrations and images included in CareNotes® are the copyrighted property of [employer]  or [employer]os [employer]  The above information is an  only  It is not intended as medical advice for individual conditions or treatments  Talk to your doctor, nurse or pharmacist before following any medical regimen to see if it is safe and effective for you  Hiatal Hernia   WHAT YOU NEED TO KNOW:   A hiatal hernia is a condition that causes part of your stomach to bulge through the hiatus (small opening) in your diaphragm  The part of the stomach may move up and down, or it may get trapped above the diaphragm  DISCHARGE INSTRUCTIONS:   Seek care immediately if:   · You have severe abdominal pain  · You try to vomit but nothing comes out (retching)  · You have severe chest pain and sudden trouble breathing  · Your bowel movements are black or bloody  · Your vomit looks like coffee grounds or has blood in it  Contact your healthcare provider if:   · Your symptoms are getting worse  · You have nausea, and you are vomiting  · You are losing weight without trying  · You have questions or concerns about your condition or care  Medicines:   · Medicines  may be given to relieve heartburn symptoms  These medicines help to decrease or block stomach acid  You may also be given medicines that help to tighten the esophageal sphincter  · Take your medicine as directed  Contact your healthcare provider if you think your medicine is not helping or if you have side effects  Tell him or her if you are allergic to any medicine  Keep a list of the medicines, vitamins, and herbs you take  Include the amounts, and when and why you take them  Bring the list or the pill bottles to follow-up visits  Carry your medicine list with you in case of an emergency      Follow up with your healthcare provider as directed:  Write down your questions so you remember to ask them during your visits  Self care:   · Avoid foods that make your symptoms worse  These may include spicy foods, fruit juices, alcohol, caffeine, chocolate, and mint  · Eat several small meals during the day  Small meals give your stomach less food to digest     · Avoid lying down and bending forward after you eat  Do not eat meals 2 to 3 hours before bedtime  This decreases your risk for reflux  · Maintain a healthy weight  If you are overweight, weight loss may help relieve your symptoms  · Sleep with your head elevated  at least 6 inches  · Do not smoke  Smoking can increase your symptoms of heartburn  © Copyright [address] Information is for End User's use only and may not be sold, redistributed or otherwise used for commercial purposes  All illustrations and images included in CareNotes® are the copyrighted property of A D A M , Inc  or [facility] Gilson Robbins   The above information is an  only  It is not intended as medical advice for individual conditions or treatments  Talk to your doctor, nurse or pharmacist before following any medical regimen to see if it is safe and effective for you  Colonoscopy   WHAT YOU NEED TO KNOW:   A colonoscopy is a procedure to examine the inside of your colon (intestine) with a scope  Polyps or tissue growths may have been removed during your colonoscopy  It is normal to feel bloated and to have some abdominal discomfort  You should be passing gas  If you have hemorrhoids or you had polyps removed, you may have a small amount of bleeding  DISCHARGE INSTRUCTIONS:   Seek care immediately if:   · You have a large amount of bright red blood in your bowel movements  · Your abdomen is hard and firm and you have severe pain  · You have sudden trouble breathing  Contact your healthcare provider if:   · You develop a rash or hives  · You have a fever within 24 hours of your procedure        · You have not had a bowel movement for 3 days after your procedure  · You have questions or concerns about your condition or care  Activity:   ·      · Rest after your procedure  You have been given medicine to relax you  Do not  drive or make important decisions until the day after your procedure  Return to your normal activity as directed  · Relieve gas and discomfort from bloating  by lying on your right side with a heating pad on your abdomen  You may need to take short walks to help the gas move out  Eat small meals until bloating is relieved  If you had polyps removed: For 7 days after your procedure:  · Do not  take aspirin  ·   ·   ·   Follow up with your healthcare provider as directed:  Write down your questions so you remember to ask them during your visits  © 2017 [address] is for End User's use only and may not be sold, redistributed or otherwise used for commercial purposes  All illustrations and images included in CareNotes® are the copyrighted property of A D A M , Inc  or [employer] Campbell  The above information is an  only  It is not intended as medical advice for individual conditions or treatments  Talk to your doctor, nurse or pharmacist before following any medical regimen to see if it is safe and effective for you  Colorectal Polyps   WHAT YOU NEED TO KNOW:   Colorectal polyps are small growths of tissue in the lining of the colon and rectum  Most polyps are hyperplastic polyps and are usually benign (noncancerous)  Certain types of polyps, called adenomatous polyps, may turn into cancer  DISCHARGE INSTRUCTIONS:   Follow up with your healthcare provider or gastroenterologist as directed: You may need to return for more tests, such as another colonoscopy  Write down your questions so you remember to ask them during your visits    Reduce your risk for colorectal polyps:   Eat a variety of healthy foods:  Healthy foods include fruit, vegetables, whole-grain breads, low-fat dairy products, beans, lean meat, and fish  Ask if you need to be on a special diet  Maintain a healthy weight:  Ask your healthcare provider if you need to lose weight and how much you need to lose  Ask for help with a weight loss program     Exercise:  Begin to exercise slowly and do more as you get stronger  Talk with your healthcare provider before you start an exercise program      Limit alcohol:  Your risk for polyps increases the more you drink  Do not smoke: If you smoke, it is never too late to quit  Ask for information about how to stop  For support and more information:   Zhanna Abbott ([facility]) [address], [place] [address]  Phone: [phone]  Web Address: [place]  [facility] gov    Contact your healthcare provider or gastroenterologist if:   You have a fever  You have chills, a cough, or feel weak and achy  You have abdominal pain that does not go away or gets worse after you take medicine  Your abdomen is swollen  You are losing weight without trying  You have questions or concerns about your condition or care  Seek care immediately or call 911 if:   You have sudden shortness of breath  You have a fast heart rate, fast breathing, or are too dizzy to stand up  You have severe abdominal pain  You see blood in your bowel movement  © Copyright [address] Information is for End User's use only and may not be sold, redistributed or otherwise used for commercial purposes  All illustrations and images included in CareNotes® are the copyrighted property of A [employer]  or [facility] Gilson Robbins   The above information is an  only  It is not intended as medical advice for individual conditions or treatments  Talk to your doctor, nurse or pharmacist before following any medical regimen to see if it is safe and effective for you

## 2021-01-11 NOTE — INTERVAL H&P NOTE
H&P reviewed  After examining the patient I find no changes in the patients condition since the H&P had been written      Vitals:    01/11/21 1013   BP: (!) 183/90   Pulse: 67   Resp: 19   Temp: (!) 96 7 °F (35 9 °C)   SpO2: 98%

## 2021-01-11 NOTE — ANESTHESIA POSTPROCEDURE EVALUATION
Post-Op Assessment Note    CV Status:  Stable    Pain management: adequate     Mental Status:  Alert   PONV Controlled:  None   Airway Patency:  Patent      Post Op Vitals Reviewed: Yes      Staff: Anesthesiologist       Blood pressure (!) 210/95, pulse 85, temperature (!) 96 7 °F (35 9 °C), temperature source Temporal, resp  rate 20, SpO2 98 %  No complications documented      BP     Temp     Pulse     Resp      SpO2

## 2021-01-21 ENCOUNTER — TELEMEDICINE (OUTPATIENT)
Dept: FAMILY MEDICINE CLINIC | Facility: CLINIC | Age: 56
End: 2021-01-21

## 2021-01-21 DIAGNOSIS — E66.9 CLASS 2 OBESITY WITH BODY MASS INDEX (BMI) OF 35.0 TO 35.9 IN ADULT, UNSPECIFIED OBESITY TYPE, UNSPECIFIED WHETHER SERIOUS COMORBIDITY PRESENT: Primary | ICD-10-CM

## 2021-01-21 DIAGNOSIS — I10 ESSENTIAL HYPERTENSION: ICD-10-CM

## 2021-01-21 PROCEDURE — 99442 PR PHYS/QHP TELEPHONE EVALUATION 11-20 MIN: CPT | Performed by: FAMILY MEDICINE

## 2021-01-21 RX ORDER — LOSARTAN POTASSIUM 25 MG/1
25 TABLET ORAL DAILY
Qty: 90 TABLET | Refills: 1 | Status: SHIPPED | OUTPATIENT
Start: 2021-01-21 | End: 2021-11-29

## 2021-01-21 RX ORDER — AMLODIPINE BESYLATE 10 MG/1
10 TABLET ORAL DAILY
Qty: 90 TABLET | Refills: 1 | Status: SHIPPED | OUTPATIENT
Start: 2021-01-21 | End: 2021-11-29

## 2021-01-21 NOTE — ASSESSMENT & PLAN NOTE
Currently blood pressure is controlled at this time   Reviewed BP target goal with patient   Continue to maintain healthy balanced diet with focus on low salt intake   Limit alcohol intake        - Continue Amlodipine and Losartan

## 2021-01-21 NOTE — PROGRESS NOTES
Virtual Brief Visit    Assessment/Plan:    Problem List Items Addressed This Visit        Cardiovascular and Mediastinum    Essential hypertension     Currently blood pressure is controlled at this time   Reviewed BP target goal with patient   Continue to maintain healthy balanced diet with focus on low salt intake   Limit alcohol intake        - Continue Amlodipine and Losartan            Other    Class 2 obesity in adult - Primary     Counseled patient on the importance of working to achieve weight reduction goal   Discussed benefits of weight loss including prevention or control of comorbidities   Discussed role that balanced diet and daily activity play in weight reduction   Set up small attainable weight loss goals   Involve family, friends, and co-workers for support  Reason for visit is   Chief Complaint   Patient presents with    Virtual Brief Visit        Encounter provider Mane Hartman MD    Provider located at [address] [place] [id number] [phone]    Recent Visits  No visits were found meeting these conditions  Showing recent visits within past 7 days and meeting all other requirements     Today's Visits  Date Type Provider Dept   01/21/21 Telemedicine Mane Hartman MD  [facility]   Showing today's visits and meeting all other requirements     Future Appointments  No visits were found meeting these conditions  Showing future appointments within next 150 days and meeting all other requirements        After connecting through telephone, the patient was identified by name and date of birth  Georgia Born was informed that this is a telemedicine visit and that the visit is being conducted through telephone  My office door was closed  No one else was in the room  He acknowledged consent and understanding of privacy and security of the platform   The patient has agreed to participate and understands he can discontinue the visit at any time  Patient is aware this is a billable service  Subjective    Fernando Zaiid is a 63 y o  male  HPI   This is a very pleasant 63 y o  male who presents to the clinic for management of their chronic medical conditions  Patient's medical conditions are stable unless noted otherwise above  Patient has not had any recent hospitalizations, or medical emergencies since last visit  Patient has no further complaints other than what is mentioned in the ROS  Past Medical History:   Diagnosis Date    Colon polyp     GERD (gastroesophageal reflux disease)     Hyperlipidemia     Hypertension     Sleep apnea        Past Surgical History:   Procedure Laterality Date    BACK SURGERY  2009    COLONOSCOPY         Current Outpatient Medications   Medication Sig Dispense Refill    amLODIPine (NORVASC) 10 mg tablet Take 1 tablet (10 mg total) by mouth daily 90 tablet 1    atorvastatin (LIPITOR) 40 mg tablet Take 1 tablet (40 mg total) by mouth daily 90 tablet 1    esomeprazole (NexIUM) 40 MG capsule Take 1 capsule (40 mg total) by mouth daily 90 capsule 1    losartan (COZAAR) 25 mg tablet Take 1 tablet (25 mg total) by mouth daily 90 tablet 1    zolpidem (AMBIEN) 10 mg tablet Take one tablet at lights out on night of sleep study 1 tablet 1     No current facility-administered medications for this visit  No Known Allergies    Review of Systems   Constitutional: Negative for activity change, appetite change, chills, diaphoresis, fatigue and fever  HENT: Negative for congestion, ear pain, hearing loss, postnasal drip, rhinorrhea, sneezing, sore throat and tinnitus  Eyes: Negative for pain  Respiratory: Negative for apnea, cough, choking, chest tightness, shortness of breath and wheezing  Cardiovascular: Negative for chest pain, palpitations and leg swelling     Gastrointestinal: Negative for abdominal distention, abdominal pain, constipation, diarrhea, nausea and vomiting  Genitourinary: Negative for decreased urine volume and dysuria  Musculoskeletal: Negative for back pain  Skin: Negative for rash  Neurological: Negative for dizziness, tremors and syncope  Psychiatric/Behavioral: Negative for agitation and suicidal ideas  There were no vitals filed for this visit  I spent 12 minutes directly with the patient during this visit    VIRTUAL VISIT DISCLAIMER    Aruna Bhakta acknowledges that he has consented to an online visit or consultation  He understands that the online visit is based solely on information provided by him, and that, in the absence of a face-to-face physical evaluation by the physician, the diagnosis he receives is both limited and provisional in terms of accuracy and completeness  This is not intended to replace a full medical face-to-face evaluation by the physician  Aruna Bhakta understands and accepts these terms

## 2021-01-28 ENCOUNTER — EVALUATION (OUTPATIENT)
Dept: PHYSICAL THERAPY | Facility: CLINIC | Age: 56
End: 2021-01-28
Payer: COMMERCIAL

## 2021-01-28 DIAGNOSIS — G89.29 CHRONIC BILATERAL THORACIC BACK PAIN: Primary | ICD-10-CM

## 2021-01-28 DIAGNOSIS — M54.6 CHRONIC BILATERAL THORACIC BACK PAIN: Primary | ICD-10-CM

## 2021-01-28 PROCEDURE — 97162 PT EVAL MOD COMPLEX 30 MIN: CPT

## 2021-01-28 PROCEDURE — 97112 NEUROMUSCULAR REEDUCATION: CPT

## 2021-01-28 NOTE — PROGRESS NOTES
PT Evaluation     Today's date: 2021  Patient name: Peggy Francisco  : 1965  MRN: [id number]  Referring provider: Husam Costello PT  Dx:   Encounter Diagnosis     ICD-10-CM    1  Chronic bilateral thoracic back pain  M54 6 Ambulatory referral to PT spine    G89 29                   Assessment  Assessment details: Peggy Francisco is a 63 y o  male who presents today to outpatient therapy for evaluation of chronic bilateral thoracic back pain  Upon assessment today, pt exhibits postural deviations; decreased/painful T/S AROM, shoulder AROM, cervical, and L/S AROM; decreased glute med strength; decreased HS/piriformis flexibility on (L); and TTP throughout TIAGO upper and lower quarters  These impairments are contributing to functional limitations with sleeping; reaching OH; cleaning; standing/walking prolonged periods of time; and performing repetitive movements daily  Pt would therefore benefit from PT intervention in order to address the aforementioned deficits so that he can return to his PLOF and function comfortably/safely in his home and surrounding environment  Thank you for the referral! - Yudy Valencia, PT, DPT  Impairments: abnormal or restricted ROM, abnormal movement, impaired balance, impaired physical strength, pain with function and poor posture   Understanding of Dx/Px/POC: good   Prognosis: good    Goals  STG 1: Pt will demonstrate compliance w/ HEP to supplement therapy in 1-2 weeks  STG 2: Pt will demonstrate improved T/s AROM by 25% in 2-4 weeks  LTG 1: Pt will be able to stand comfortably for 1-2 hours to assist pt w/ completing ADLs in 6-8 weeks  (INITIAL: 15-30 min depending on day)  LTG 2: Pt will be able to walk 1 mile w/ min difficulty in 6-8 weeks  LTG 3: Pt will be able to clean his home w/ minimal difficulty in 6-8 weeks  LTG 4: Pt will be able to reach [facility] w/ min increased symptoms to assist pt w/ completion of ADLs thru his home in 6-8 weeks    LTG 5: Pt will be able to sleep comfortably at night w/ min difficulty related to the back in 6-8 weeks  Plan  Patient would benefit from: skilled physical therapy  Planned modality interventions: cryotherapy, TENS, traction and unattended electrical stimulation  Planned therapy interventions: abdominal trunk stabilization, self care, postural training, patient education, neuromuscular re-education, joint mobilization, manual therapy, massage, activity modification, balance, body mechanics training, breathing training, Araujo taping, strengthening, stretching, therapeutic activities, coordination, flexibility, home exercise program, therapeutic exercise and functional ROM exercises  Frequency: 2x week  Duration in weeks: 4  Treatment plan discussed with: patient        Subjective Evaluation    History of Present Illness  Mechanism of injury: Pt reports a hx of L5-S1 laminectomy surgery on his back in   Since then, pt reports onset of spasms thru the upper back which occur intermittently  Pt also reports stiffness throughout his hips, (L) > (R)  Pt states that he f/u w/ his doctor as a result of ongoing pain who suggested that pt perform physical therapy  Currently, pt reports difficulty sleeping; reaching OH; cleaning; standing prolonged periods of time (>15-30 min); and performing repeated movements daily  Eases: Medications, ice  Pain  Current pain ratin  At best pain ratin  At worst pain rating: 10  Location: Pt reports pain/spasms thru TIAGO upper back (shoulder blade region) and thru the lower back (TIAGO hips)  Patient Goals  Patient goals for therapy: decreased pain  Patient goal: Pt would like to improve his walking and be able to move more so that he can be active again  Objective     Palpation     Additional Palpation Details  Min-mod TTP thru (R) UT, rhomboids; mod-severe TTP thru (L) UT, rhomboids, T/S PS, L/S PS  Min to no TTP thru (R) L/S PS and T/S PS       Active Range of Motion Cervical/Thoracic Spine       Cervical    Flexion:  WFL  Extension:  Restriction level: moderate  Left lateral flexion:  Restriction level: minimal  Right lateral flexion:  with pain Restriction level moderate  Left rotation:  with pain Restriction level: moderate  Right rotation:  Restriction level: minimal    Thoracic    Flexion:  with pain Restriction level: minimal  Extension:  with pain Restriction level: minimal  Left rotation:  Restriction level: minimal  Right rotation:  with pain Restriction level: moderate  Left Shoulder   Flexion: 130 degrees with pain  Abduction: 100 degrees with pain  External rotation 0°: with pain  External rotation BTH: Active external rotation behind the head: Attempted, unable d/t pain  Internal rotation BTB: L3 with pain    Right Shoulder   Flexion: 150 degrees   Abduction: 100 degrees with pain  External rotation BTH: T4   Internal rotation BTB: T10     Lumbar   Flexion:  with pain Restriction level: moderate  Extension:  with pain Restriction level: moderate  Left lateral flexion:  Restriction level: minimal  Right lateral flexion:  with pain Restriction level: minimal  Left rotation:  Restriction level: minimal  Right rotation:  Restriction level: minimal    Additional Active Range of Motion Details  HS flexibility on (R) WNL, (L) mod dec/painful  LTG: Min dec  Piriformis flexibility on (R): Min-mod dec, (L): mod severely dec  LTG: Min dec  Strength/Myotome Testing     Left Shoulder     Planes of Motion   Flexion: 5   Abduction: 5   External rotation at 0°: 5   Internal rotation at 0°: 5     Isolated Muscles   Biceps: 5   Triceps: 5     Right Shoulder     Planes of Motion   Flexion: 5   Abduction: 5   External rotation at 0°: 5   Internal rotation at 0°: 5     Isolated Muscles   Biceps: 5   Triceps: 5     Additional Strength Details  Hip abd strength on (R): 4/5, (L): 3+/5   LT+/5 TIAGO             Precautions: GERD; sleep apnea; HTN; chronic T/S pain; hyperlipidemia    Date 1/28            FOTO IE             Re-eval IE                Manuals 1/28                                                                Neuro Re-Ed 1/28            Scap retractions 15x3" HEP            Post shoulder rolls 15x HEP            Chin tucks 15x HEP            Spidermans             Scissors*             No moneys nv            TVA Contractions nv            BKFO w/ TVA nv            SA punches nv            Quad alt UE**             Mod front plank**             UE PNF             Rows/ext nv            Palloff press             Wall ABCs                          Ther Ex 1/28            UT (S)  5x10" HEP            Flex, scaption             Corner pec (S) nv            T/S ext in chair nv            T/S rotation nv            (L) Piriformis (S) nv            (L) HS (S), supine nv            Open books nv            Cat/camel             Bridges             Clams             Sidelying hip abd             Prone Y, T's                          Ther Activity 1/28            Retro UBE nv            Pulleys - flex, scap nv            Sidesteps             Mini squats             Standing hip hinge             Pt Edu                          Modalities 1/28            Ice - prn

## 2021-02-02 ENCOUNTER — APPOINTMENT (OUTPATIENT)
Dept: PHYSICAL THERAPY | Facility: CLINIC | Age: 56
End: 2021-02-02
Payer: COMMERCIAL

## 2021-02-09 ENCOUNTER — OFFICE VISIT (OUTPATIENT)
Dept: PHYSICAL THERAPY | Facility: CLINIC | Age: 56
End: 2021-02-09
Payer: COMMERCIAL

## 2021-02-09 DIAGNOSIS — G89.29 CHRONIC BILATERAL THORACIC BACK PAIN: Primary | ICD-10-CM

## 2021-02-09 DIAGNOSIS — Z20.822 ENCOUNTER FOR PREPROCEDURE SCREENING LABORATORY TESTING FOR COVID-19: ICD-10-CM

## 2021-02-09 DIAGNOSIS — M54.6 CHRONIC BILATERAL THORACIC BACK PAIN: Primary | ICD-10-CM

## 2021-02-09 DIAGNOSIS — Z01.812 ENCOUNTER FOR PREPROCEDURE SCREENING LABORATORY TESTING FOR COVID-19: ICD-10-CM

## 2021-02-09 PROCEDURE — 97530 THERAPEUTIC ACTIVITIES: CPT

## 2021-02-09 PROCEDURE — U0005 INFEC AGEN DETEC AMPLI PROBE: HCPCS

## 2021-02-09 PROCEDURE — U0003 INFECTIOUS AGENT DETECTION BY NUCLEIC ACID (DNA OR RNA); SEVERE ACUTE RESPIRATORY SYNDROME CORONAVIRUS 2 (SARS-COV-2) (CORONAVIRUS DISEASE [COVID-19]), AMPLIFIED PROBE TECHNIQUE, MAKING USE OF HIGH THROUGHPUT TECHNOLOGIES AS DESCRIBED BY CMS-2020-01-R: HCPCS

## 2021-02-09 PROCEDURE — 97110 THERAPEUTIC EXERCISES: CPT

## 2021-02-09 PROCEDURE — 97112 NEUROMUSCULAR REEDUCATION: CPT

## 2021-02-09 NOTE — PROGRESS NOTES
Daily Note     Today's date: 2021  Patient name: Ozzy Session  : 1965  MRN: [id number]  Referring provider: Cindy Bautista, PT  Dx:   Encounter Diagnosis     ICD-10-CM    1  Chronic bilateral thoracic back pain  M54 6     G89 29                   Subjective: Pt states that he experienced severe muscle spasms thru his upper back yesterday which eased only slightly after use of heat and massage by his wife  Pt states that today he is able to walk a little better  Pt states that his back felt a bit looser following the stretches he learned during IE and reports compliance to date  Objective: See treatment diary below      Assessment: Tolerated treatment well, reporting reduced pain/improved mobility throughout tx session  Pt w/ difficulty completing no moneys and tband rows/ext correctly despite VC and demonstration from therapist  Educated pt today about role of posture; pain levels during exercise; and goals of stretching exercises vs strengthening  Patient demonstrated fatigue post treatment, exhibited good technique with therapeutic exercises and would benefit from continued PT to progress postural stability and cervicothoracic ROM to reduce stresses on the spine and improve pt's tolerance to standing/sitting comfortably throughout the day  Plan: Continue per plan of care  Progress treatment as tolerated  Precautions: GERD; sleep apnea; HTN; chronic T/S pain; hyperlipidemia    Date            FOTO IE             Re-eval IE                Manuals                                                                Neuro Re-Ed            Scap retractions 15x3" HEP 15x3"           Post shoulder rolls 15x HEP 15x           Chin tucks 15x HEP 15x           Spidermans             Scissors*             No moneys nv OTB 20x           TVA Contractions nv            BKFO w/ TVA nv            SA punches nv            Quad alt UE**             Mod front plank** UE PNF             Rows/ext nv RTB 2x10 ea           Palloff press             Wall ABCs                          Ther Ex 1/28 2/9           UT (S)  5x10" HEP 5x10"            Flex, scaption             Corner pec (S) nv 5x10"           T/S ext in chair nv 15x3"           T/S rotation nv 10x ea           (L) Piriformis (S) nv            (L) HS (S), supine nv Seated           Open books nv 10x5"           Cat/camel             Bridges             Clams             Sidelying hip abd             Prone Y, T's                          Ther Activity 1/28 2/9           Retro UBE nv 5' L1            Pulleys - flex, scap nv 3' ea           Sidesteps             Mini squats             Standing hip hinge             Pt Edu                          Modalities 1/28 2/9           Ice - prn

## 2021-02-11 ENCOUNTER — OFFICE VISIT (OUTPATIENT)
Dept: PHYSICAL THERAPY | Facility: CLINIC | Age: 56
End: 2021-02-11
Payer: COMMERCIAL

## 2021-02-11 DIAGNOSIS — M54.6 CHRONIC BILATERAL THORACIC BACK PAIN: Primary | ICD-10-CM

## 2021-02-11 DIAGNOSIS — G89.29 CHRONIC BILATERAL THORACIC BACK PAIN: Primary | ICD-10-CM

## 2021-02-11 LAB — SARS-COV-2 RNA RESP QL NAA+PROBE: NEGATIVE

## 2021-02-11 PROCEDURE — 97112 NEUROMUSCULAR REEDUCATION: CPT

## 2021-02-11 PROCEDURE — 97530 THERAPEUTIC ACTIVITIES: CPT

## 2021-02-11 PROCEDURE — 97110 THERAPEUTIC EXERCISES: CPT

## 2021-02-11 NOTE — PROGRESS NOTES
Daily Note     Today's date: 2021  Patient name: Brina Lopez  : 1965   MRN: [id number]  Referring provider: Stefano Brantley, PT  Dx:   Encounter Diagnosis     ICD-10-CM    1  Chronic bilateral thoracic back pain  M54 6     G89 29        Start Time: 1031  Stop Time: 1129  Total time in clinic (min): 58 minutes  Subjective: Pt states that while he continues to experience spasms thru his upper back, the pain is improving a little and he reports less tightness following performance of exercises  Pt also reports that using heat provides him with some relief  Objective: See treatment diary below      Assessment: Tolerated treatment well  Pt demonstrates good understanding of current exercise program however continues to require cuing from therapist to perform exercises with slowed speed and to improve scapular retraction  Pt fatigued when performing addition of sidesteps/squats, requiring rest breaks throughout to accommodate for fatigue  Patient demonstrated fatigue post treatment, exhibited good technique with therapeutic exercises and would benefit from continued PT to progress periscapular strength and thoracic mobility to improve pt's tolerance to standing prolonged periods of time as when completing ADLs  Plan: Continue per plan of care  Progress treatment as tolerated  Precautions: GERD; sleep apnea; HTN; chronic T/S pain; hyperlipidemia    Date           FOTO IE             Re-eval IE                Manuals                                                               Neuro Re-Ed           Scap retractions 15x3" HEP 15x3" D/c          Post shoulder rolls 15x HEP 15x D/c           Chin tucks 15x HEP 15x D/c          Spidermans             Scissors*             No moneys nv OTB 20x OTB 3x10          TVA Contractions nv            BKFO w/ TVA nv            SA punches nv            Quad alt UE**             Mod front plank**             UE PNF Rows/ext nv RTB 2x10 ea RTB 2x15ea          Palloff press             Wall ABCs                          Ther Ex 1/28 2/9 2/11          UT (S)  5x10" HEP 5x10"  D/c           Flex, scaption             Corner pec (S) nv 5x10"           T/S ext in chair nv 15x3" 20x3"          T/S rotation nv 10x ea 10x ea          (L) Piriformis (S) nv            (L) HS (S), supine nv Seated 4x15" 4x15" seated supine w/ strap         Open books nv 10x5" 5"x10ea          Cat/camel             Bridges             Clams             Sidelying hip abd             Prone Y, T's                          Ther Activity 1/28 2/9 2/11          Retro UBE nv 5' L1  5' L1          Pulleys - flex, scap nv 3' ea 3' ea          Sidesteps   4 laps          Mini squats   x10          Standing hip hinge             Pt Edu                          Modalities 1/28 2/9 2/11          Ice - prn

## 2021-02-15 ENCOUNTER — OFFICE VISIT (OUTPATIENT)
Dept: PHYSICAL THERAPY | Facility: CLINIC | Age: 56
End: 2021-02-15
Payer: COMMERCIAL

## 2021-02-15 DIAGNOSIS — G89.29 CHRONIC BILATERAL THORACIC BACK PAIN: Primary | ICD-10-CM

## 2021-02-15 DIAGNOSIS — M54.6 CHRONIC BILATERAL THORACIC BACK PAIN: Primary | ICD-10-CM

## 2021-02-15 PROCEDURE — 97110 THERAPEUTIC EXERCISES: CPT

## 2021-02-15 PROCEDURE — 97112 NEUROMUSCULAR REEDUCATION: CPT

## 2021-02-15 PROCEDURE — 97530 THERAPEUTIC ACTIVITIES: CPT

## 2021-02-15 NOTE — PROGRESS NOTES
Daily Note     Today's date: 2/15/2021  Patient name: Shanna Atkinson  : 1965   MRN: [id number]  Referring provider: Conchis Ojeda, PT  Dx:   Encounter Diagnosis     ICD-10-CM    1  Chronic bilateral thoracic back pain  M54 6     G89 29        Start Time: 1405  Stop Time: 1448  Total time in clinic (min): 43 minutes  Subjective:  Pt denies DOMS following LV - notes feeling "pretty good" post Tx  Pt arrives to today's Tx noting his T/S back pain Sx "are feeling better" since attending PT - also attributes this to HEP compliance  Objective: See treatment diary below    Assessment: Tolerated treatment well  Pt continues to require tactile and VC'ing for form /c all exercise interventions - fair carryover  Pt reported challenge /c addition of Pallof press today, notes addition of wall ABC's "felt good " Pt demonstrated fatigue post treatment, exhibited good technique with therapeutic exercises and would benefit from continued PT to progress periscapular strength and thoracic mobility to improve pt's tolerance to standing prolonged periods of time as when completing ADLs  Plan: Cont /c PT POC  Progress as tolerated  Precautions: GERD; sleep apnea; HTN; chronic T/S pain; hyperlipidemia    Date 1/28 2/9 2/11 02/15         FOTO IE             Re-eval IE                Manuals 1/28 2/9 2/11 02/15                                                             Neuro Re-Ed 1/28 2/9 2/11 02/15         Scap retractions 15x3" HEP 15x3" D/c          Post shoulder rolls 15x HEP 15x D/c           Chin tucks 15x HEP 15x D/c          Spidermans    RTB 2x5         Scissors*             No moneys nv OTB 20x OTB 3x10          TVA Contractions nv            BKFO w/ TVA nv            SA punches nv            Quad alt UE**             Mod front plank**             UE PNF             Rows/ext nv RTB 2x10 ea RTB 2x15ea GTB 2x15ea         Palloff press    RTB 5"x10ea         Wall ABCs    GMB 1ea                      Ther Ex 1/28 2/9 2/11 02/15         UT (S)  5x10" HEP 5x10"  D/c           Flex, scaption             Corner pec (S) nv 5x10"           T/S ext in chair nv 15x3" 20x3"          T/S rotation nv 10x ea 10x ea          (L) Piriformis (S) nv            (L) HS (S), supine nv Seated 4x15" 4x15" seated supine w/ strap         Open books nv 10x5" 5"x10ea --         Cat/camel             Bridges             Clams             Sidelying hip abd             Prone Y, T's                          Ther Activity 1/28 2/9 2/11 02/15         Retro UBE nv 5' L1  5' L1 5' L1 >45RPM         Pulleys - flex, scap nv 3' ea 3' ea 5'          Sidesteps   4 laps 5 laps         Mini squats   x10 2x10         Standing hip hinge             Pt Edu                          Modalities 1/28 2/9 2/11 02/15         Ice - prn                              Gt Wilson, PTA

## 2021-02-16 ENCOUNTER — HOSPITAL ENCOUNTER (OUTPATIENT)
Dept: SLEEP CENTER | Facility: CLINIC | Age: 56
Discharge: HOME | End: 2021-02-16
Payer: COMMERCIAL

## 2021-02-16 DIAGNOSIS — G47.33 OSA (OBSTRUCTIVE SLEEP APNEA): ICD-10-CM

## 2021-02-16 PROCEDURE — 95811 POLYSOM 6/>YRS CPAP 4/> PARM: CPT

## 2021-02-16 PROCEDURE — 95811 POLYSOM 6/>YRS CPAP 4/> PARM: CPT | Performed by: INTERNAL MEDICINE

## 2021-02-17 NOTE — PROGRESS NOTES
Sleep Study Documentation    Pre-Sleep Study       Sleep testing procedure explained to patient:YES    Patient napped prior to study:NO    Caffeine:Dayshift worker after 12PM   Caffeine use:YES- soda  12 ounces    Alcohol:Dayshift workers after 5PM: Alcohol use:NO    Typical day for patient:YES       Study Documentation    Sleep Study Indications: Diagnostic results AHI 69, while supine AHI 68, while in Rem  AHI 55, with desats of 72%     Sleep Study: Treatment   Optimal PAP pressure: 10  Leak:Small  Snore:Eliminated  REM Obtained:yes  Supplemental O2: no    Minimum SaO2 92  Baseline SaO2 96  PAP mask tried (list all)F/P Eson 2  PAP mask choice (final)same  PAP mask type:nasal  PAP pressure at which snoring was eliminated 10  Minimum SaO2 at final PAP pressure 94  Mode of Therapy:CPAP  ETCO2:No  CPAP changed to BiPAP:No    Mode of Therapy:CPAP    EKG abnormalities: no     EEG abnormalities: no    Sleep Study Recorded < 2 hours: N/A    Sleep Study Recorded > 2 hours but incomplete study: N/A    Sleep Study Recorded 6 hours but no sleep obtained: NO          Post-Sleep Study    Medication used at bedtime or during sleep study:NO    Patient reports time it took to fall asleep:greater than 60 minutes    Patient reports waking up during study:3 or more times  Patient reports returning to sleep in greater than 30 minutes  Patient reports sleeping less than 2 hours without dreaming  Patient reports sleep during study:worse than usual    Patient rated sleepiness: Very sleepy or tired    PAP treatment:yes: Post PAP treatment patient reports feeling unsure if a change is noted and  would wear PAP mask at home

## 2021-02-19 ENCOUNTER — TELEPHONE (OUTPATIENT)
Dept: SLEEP CENTER | Facility: CLINIC | Age: 56
End: 2021-02-19

## 2021-02-19 ENCOUNTER — TELEPHONE (OUTPATIENT)
Dept: FAMILY MEDICINE CLINIC | Facility: CLINIC | Age: 56
End: 2021-02-19

## 2021-02-19 DIAGNOSIS — G47.33 OSA (OBSTRUCTIVE SLEEP APNEA): Primary | ICD-10-CM

## 2021-02-19 NOTE — TELEPHONE ENCOUNTER
Advised patient sleep study results  He  Has DME set up and compliance appointments scheduled    Appointment information emailed to [employer]

## 2021-02-19 NOTE — PROGRESS NOTES
CPAP titration completed  CPAP equipment ordered, using CPAP 10-20cm with nasal mask  Patient to be scheduled for set up of equipment and compliance follow up 31-91 days after set up

## 2021-02-19 NOTE — TELEPHONE ENCOUNTER
----- Message from Rosalina Moreno, [address] Sak[address] Aden sent at 2/19/2021  9:30 AM EST -----  CPAP titration completed  CPAP equipment ordered, using CPAP 10-20cm with nasal mask  Confirm patient's appointments for set up of equipment and compliance follow up  600 ml

## 2021-03-16 ENCOUNTER — TELEPHONE (OUTPATIENT)
Dept: FAMILY MEDICINE CLINIC | Facility: CLINIC | Age: 56
End: 2021-03-16

## 2021-03-16 ENCOUNTER — HOSPITAL ENCOUNTER (EMERGENCY)
Facility: HOSPITAL | Age: 56
Discharge: LEFT BEFORE DISCHARGE | End: 2021-03-16
Attending: EMERGENCY MEDICINE | Admitting: EMERGENCY MEDICINE
Payer: COMMERCIAL

## 2021-03-16 VITALS
BODY MASS INDEX: 34.22 KG/M2 | WEIGHT: 231.7 LBS | SYSTOLIC BLOOD PRESSURE: 178 MMHG | OXYGEN SATURATION: 99 % | HEART RATE: 93 BPM | TEMPERATURE: 97.5 F | DIASTOLIC BLOOD PRESSURE: 99 MMHG | RESPIRATION RATE: 16 BRPM

## 2021-03-16 DIAGNOSIS — R05.9 COUGH: Primary | ICD-10-CM

## 2021-03-16 PROCEDURE — 99285 EMERGENCY DEPT VISIT HI MDM: CPT

## 2021-03-16 PROCEDURE — 99282 EMERGENCY DEPT VISIT SF MDM: CPT | Performed by: PHYSICIAN ASSISTANT

## 2021-03-16 RX ORDER — SODIUM CHLORIDE 9 MG/ML
250 INJECTION, SOLUTION INTRAVENOUS CONTINUOUS
Status: DISCONTINUED | OUTPATIENT
Start: 2021-03-16 | End: 2021-03-16 | Stop reason: HOSPADM

## 2021-03-16 NOTE — TELEPHONE ENCOUNTER
attempted to call patient for virtual visit  A man answered and then hung up  After only received VM  Left message to call office

## 2021-03-16 NOTE — ED PROVIDER NOTES
History  Chief Complaint   Patient presents with    Shortness of Breath     SOB/wheezing for about 6 days     Pt with 1 week of productive cough and congestion and some chest tightness  , feels a lot of rattling in his chest           Prior to Admission Medications   Prescriptions Last Dose Informant Patient Reported? Taking? amLODIPine (NORVASC) 10 mg tablet   No No   Sig: Take 1 tablet (10 mg total) by mouth daily   atorvastatin (LIPITOR) 40 mg tablet   No No   Sig: Take 1 tablet (40 mg total) by mouth daily   esomeprazole (NexIUM) 40 MG capsule   No No   Sig: Take 1 capsule (40 mg total) by mouth daily   losartan (COZAAR) 25 mg tablet   No No   Sig: Take 1 tablet (25 mg total) by mouth daily   zolpidem (AMBIEN) 10 mg tablet   No No   Sig: Take one tablet at lights out on night of sleep study      Facility-Administered Medications: None       Past Medical History:   Diagnosis Date    Colon polyp     GERD (gastroesophageal reflux disease)     Hyperlipidemia     Hypertension     Sleep apnea        Past Surgical History:   Procedure Laterality Date    BACK SURGERY  2009    COLONOSCOPY         History reviewed  No pertinent family history  I have reviewed and agree with the history as documented  E-Cigarette/Vaping    E-Cigarette Use Never User      E-Cigarette/Vaping Substances    Nicotine No     THC No     CBD No     Flavoring No     Other No     Unknown No      Social History     Tobacco Use    Smoking status: Light Tobacco Smoker     Packs/day: 0 25     Types: Cigars    Smokeless tobacco: Never Used   Substance Use Topics    Alcohol use: Yes     Comment: social    Drug use: Never       Review of Systems   Constitutional: Negative  HENT: Positive for congestion  Eyes: Negative  Respiratory: Positive for cough  Cardiovascular: Negative  Gastrointestinal: Negative  Endocrine: Negative  Genitourinary: Negative  Musculoskeletal: Negative  Skin: Negative  Allergic/Immunologic: Negative  Neurological: Negative  Hematological: Negative  Psychiatric/Behavioral: Negative  All other systems reviewed and are negative  Physical Exam  Physical Exam  Vitals signs and nursing note reviewed  Constitutional:       Appearance: Normal appearance  He is normal weight  Comments: Pt is a smoker and has history of high cholesterol     HENT:      Head: Normocephalic and atraumatic  Right Ear: Tympanic membrane, ear canal and external ear normal       Left Ear: Tympanic membrane, ear canal and external ear normal       Nose: Nose normal       Mouth/Throat:      Mouth: Mucous membranes are moist       Pharynx: Oropharynx is clear  Eyes:      Extraocular Movements: Extraocular movements intact  Conjunctiva/sclera: Conjunctivae normal       Pupils: Pupils are equal, round, and reactive to light  Neck:      Musculoskeletal: Normal range of motion  Cardiovascular:      Rate and Rhythm: Normal rate and regular rhythm  Pulses: Normal pulses  Heart sounds: Normal heart sounds  Pulmonary:      Effort: Pulmonary effort is normal       Comments: Minor coarse sounds   Abdominal:      General: Bowel sounds are normal       Palpations: Abdomen is soft  Musculoskeletal: Normal range of motion  Skin:     General: Skin is warm  Capillary Refill: Capillary refill takes less than 2 seconds  Neurological:      General: No focal deficit present  Mental Status: He is alert and oriented to person, place, and time     Psychiatric:         Mood and Affect: Mood normal          Behavior: Behavior normal          Vital Signs  ED Triage Vitals   Temperature Pulse Respirations Blood Pressure SpO2   03/16/21 1244 03/16/21 1244 03/16/21 1244 03/16/21 1245 03/16/21 1244   97 5 °F (36 4 °C) 93 16 (!) 178/99 99 %      Temp Source Heart Rate Source Patient Position - Orthostatic VS BP Location FiO2 (%)   03/16/21 1244 03/16/21 1244 03/16/21 1244 03/16/21 1244 --   Tympanic Monitor Sitting Left arm       Pain Score       --                  Vitals:    03/16/21 1244 03/16/21 1245   BP:  (!) 178/99   Pulse: 93    Patient Position - Orthostatic VS: Sitting          Visual Acuity      ED Medications  Medications   sodium chloride 0 9 % infusion (has no administration in time range)       Diagnostic Studies  Results Reviewed     Procedure Component Value Units Date/Time    CBC and differential [[id number]]     Lab Status: No result Specimen: Blood     Comprehensive metabolic panel [[id number]]     Lab Status: No result Specimen: Blood     Lipase [[id number]]     Lab Status: No result Specimen: Blood     Troponin I [[id number]]     Lab Status: No result Specimen: Blood     COVID19, Influenza A/B, RSV PCR, [facility] [[id number]]     Lab Status: No result Specimen: Nares                  XR chest 1 view portable    (Results Pending)              Procedures  Procedures         ED Course                                           MDM    Disposition  Final diagnoses:   Cough     Time reflects when diagnosis was documented in both MDM as applicable and the Disposition within this note     Time User Action Codes Description Comment    3/16/2021  1:37 PM Jamie Wiseman   Add [R05] Cough       ED Disposition     ED Disposition Condition Date/Time Comment    Cici Gomez Mar 16, 2021  1:24 PM Pt not found in room at this time      Follow-up Information    None         Discharge Medication List as of 3/16/2021  1:24 PM      CONTINUE these medications which have NOT CHANGED    Details   amLODIPine (NORVASC) 10 mg tablet Take 1 tablet (10 mg total) by mouth daily, Starting Thu 1/21/2021, Normal      atorvastatin (LIPITOR) 40 mg tablet Take 1 tablet (40 mg total) by mouth daily, Starting Mon 12/28/2020, Normal      esomeprazole (NexIUM) 40 MG capsule Take 1 capsule (40 mg total) by mouth daily, Starting Mon 12/28/2020, Normal      losartan (COZAAR) 25 mg tablet Take 1 tablet (25 mg total) by mouth daily, Starting Thu 1/21/2021, Normal      zolpidem (AMBIEN) 10 mg tablet Take one tablet at lights out on night of sleep study, Normal           No discharge procedures on file      PDMP Review       Value Time User    PDMP Reviewed  Yes 12/22/2020  1:20 PM Alen Malcolm          ED Provider  Electronically Signed by           Radha Langley PA-C  03/16/21 5389

## 2021-03-23 ENCOUNTER — OFFICE VISIT (OUTPATIENT)
Dept: FAMILY MEDICINE CLINIC | Facility: CLINIC | Age: 56
End: 2021-03-23

## 2021-03-23 VITALS
SYSTOLIC BLOOD PRESSURE: 162 MMHG | WEIGHT: 230 LBS | TEMPERATURE: 98.3 F | HEIGHT: 69 IN | RESPIRATION RATE: 16 BRPM | OXYGEN SATURATION: 98 % | HEART RATE: 84 BPM | DIASTOLIC BLOOD PRESSURE: 88 MMHG | BODY MASS INDEX: 34.07 KG/M2

## 2021-03-23 DIAGNOSIS — E78.2 MIXED HYPERLIPIDEMIA: ICD-10-CM

## 2021-03-23 DIAGNOSIS — I10 ESSENTIAL HYPERTENSION: ICD-10-CM

## 2021-03-23 DIAGNOSIS — J20.9 ACUTE BRONCHITIS, UNSPECIFIED ORGANISM: ICD-10-CM

## 2021-03-23 DIAGNOSIS — R09.81 SINUS CONGESTION: ICD-10-CM

## 2021-03-23 DIAGNOSIS — G89.29 CHRONIC THORACIC BACK PAIN, UNSPECIFIED BACK PAIN LATERALITY: ICD-10-CM

## 2021-03-23 DIAGNOSIS — M54.6 CHRONIC THORACIC BACK PAIN, UNSPECIFIED BACK PAIN LATERALITY: ICD-10-CM

## 2021-03-23 DIAGNOSIS — Z00.01 ENCOUNTER FOR GENERAL ADULT MEDICAL EXAMINATION WITH ABNORMAL FINDINGS: Primary | ICD-10-CM

## 2021-03-23 PROCEDURE — 99215 OFFICE O/P EST HI 40 MIN: CPT | Performed by: NURSE PRACTITIONER

## 2021-03-23 RX ORDER — LORATADINE 10 MG/1
10 TABLET ORAL DAILY
Qty: 30 TABLET | Refills: 5 | Status: SHIPPED | OUTPATIENT
Start: 2021-03-23

## 2021-03-23 RX ORDER — AMOXICILLIN 500 MG/1
500 CAPSULE ORAL EVERY 8 HOURS SCHEDULED
Qty: 30 CAPSULE | Refills: 0 | Status: SHIPPED | OUTPATIENT
Start: 2021-03-23 | End: 2021-04-02

## 2021-03-23 NOTE — PROGRESS NOTES
Assessment/Plan:    Problem List Items Addressed This Visit        Respiratory    Acute bronchitis     Productive cough with malaise for 3 weeks suspicious for acute bacterial sinusitis vs lower respiratory tract infection  Mucinex helped w/congestion, productive cough remains  Will cover with ABX to resolve possible infection or reinfection  No fever  Discussed smoking cessation - pt declines at this time  Relevant Medications    amoxicillin (AMOXIL) 500 mg capsule    loratadine (CLARITIN) 10 mg tablet       Cardiovascular and Mediastinum    Essential hypertension     Uncontrolled  Pt states forgot medication this AM   Historically he is controlled  Discussed relation to ASCVD as well as DASH diet  Pt acknowledges understanding  Denies MI/CVD/CVA symptoms  Due for f/u next month for annual physical             Other    Chronic thoracic back pain    Relevant Orders    XR spine lumbar minimum 4 views non injury    XR spine cervical complete 4 or 5 vw non injury    Mixed hyperlipidemia - Primary     Discussed ASCVD, diet discussed and handout provided  Due for lipid recheck in April - ordered lab and advised pt of such  Relevant Orders    Lipid panel      Other Visit Diagnoses     Sinus congestion        Relevant Medications    loratadine (CLARITIN) 10 mg tablet            Return in about 4 weeks (around 4/20/2021) for Annual physical w/PCP  Subjective:     HPI: Ozzy Elizalde is a 63 y o  male who  has a past medical history of Colon polyp, GERD (gastroesophageal reflux disease), Hyperlipidemia, Hypertension, and Sleep apnea  who presented to the office today for a follow-up of back pain  He sought a private imaging center for pricing on x-rays and requests orders previously placed printedout so he can obtain these x-rays  He also explains that since the beginning of this month, almost 3 weeks, he has had chest congestion and sinus congestion    Last week he started taking Mucinex which significantly helped but states he still has a productive cough  He coughs up white sputum  He denies fever  He has no history of COPD but he is a smoker  He also has uncontrolled hypertension today although this is historically controlled  He states he forgot to take his Norvasc this morning  We discussed the need for close monitoring of this given his elevated ASCVD risk score  We discussed the dash diet  He is due for recheck of lipid panel and annual physical next month  He has no other complaints or requests at this time  The following portions of the patient's history were reviewed and updated as appropriate: allergies, current medications, past family history, past medical history, past social history, past surgical history, and problem list     Current Outpatient Medications on File Prior to Visit   Medication Sig Dispense Refill    amLODIPine (NORVASC) 10 mg tablet Take 1 tablet (10 mg total) by mouth daily 90 tablet 1    atorvastatin (LIPITOR) 40 mg tablet Take 1 tablet (40 mg total) by mouth daily 90 tablet 1    esomeprazole (NexIUM) 40 MG capsule Take 1 capsule (40 mg total) by mouth daily 90 capsule 1    losartan (COZAAR) 25 mg tablet Take 1 tablet (25 mg total) by mouth daily 90 tablet 1    zolpidem (AMBIEN) 10 mg tablet Take one tablet at lights out on night of sleep study (Patient not taking: Reported on 3/23/2021) 1 tablet 1     No current facility-administered medications on file prior to visit  Review of Systems   Constitutional: Negative for chills and fever  HENT: Positive for congestion, postnasal drip and sinus pressure  Negative for ear pain and sore throat  Eyes: Negative for pain and visual disturbance  Respiratory: Positive for cough  Negative for shortness of breath  Cardiovascular: Negative for chest pain and palpitations  Gastrointestinal: Negative for abdominal pain and vomiting  Genitourinary: Negative for dysuria and hematuria  Musculoskeletal: Positive for back pain  Negative for arthralgias  Skin: Negative for color change and rash  Neurological: Negative for seizures and syncope  Psychiatric/Behavioral: Negative  All other systems reviewed and are negative  Objective:    /88 (BP Location: Right arm, Patient Position: Sitting, Cuff Size: Large)   Pulse 84   Temp 98 3 °F (36 8 °C) (Temporal)   Resp 16   Ht 5' 9" (1 753 m)   Wt 104 kg (230 lb)   SpO2 98%   BMI 33 97 kg/m²     Physical Exam  Constitutional:       Appearance: Normal appearance  HENT:      Head: Normocephalic  Right Ear: Tympanic membrane, ear canal and external ear normal  There is no impacted cerumen  Left Ear: Tympanic membrane, ear canal and external ear normal  There is no impacted cerumen  Nose: Nose normal       Mouth/Throat:      Mouth: Mucous membranes are moist    Eyes:      Extraocular Movements: Extraocular movements intact  Pupils: Pupils are equal, round, and reactive to light  Neck:      Musculoskeletal: Normal range of motion  Cardiovascular:      Rate and Rhythm: Normal rate and regular rhythm  Pulses: Normal pulses  Heart sounds: Normal heart sounds  Pulmonary:      Effort: Pulmonary effort is normal       Breath sounds: Normal breath sounds  No decreased breath sounds  Abdominal:      General: Bowel sounds are normal       Palpations: Abdomen is soft  Musculoskeletal: Normal range of motion  General: No tenderness or signs of injury  Thoracic back: He exhibits pain  Right lower leg: No edema  Left lower leg: No edema  Skin:     General: Skin is warm and dry  Capillary Refill: Capillary refill takes less than 2 seconds  Findings: No bruising, lesion or rash  Neurological:      General: No focal deficit present  Mental Status: He is alert and oriented to person, place, and time     Psychiatric:         Mood and Affect: Mood normal  Behavior: Behavior normal          Thought Content: Thought content normal          LYNDON Buckley  03/23/21  9:27 AM    Patient Instructions     Plan de alimentación con "enfoque dietético para detener la hipertensión (DASH, por jony siglas en inglés)   LO QUE NECESITA SABER:   El plan de alimentación DASH está diseñado para ayudar a prevenir o disminuir la hipertensión  También puede ayudar a bajar el colesterol brunilda (colesterol LDL) y disminuír lawson riesgo de enfermedad cardíaca  El plan es bajo en sodio, azúcar, grasas dañinas, y grasas en lawson totalidad  Es alto en potasio, calcio, magnesio y [place]  Estos nutrientes se agregan al consumir más frutas, vegetales y granos enteros  INSTRUCCIONES SOBRE EL ALESSANDRA HOSPITALARIA:   Lawson límite de sodio cada día: Lawson dietista le indicará la cantidad de sodio que usted debe consumir a diario  La gente que tiene la presión arterial alessandra debe consumir de 1,500 a 2,300 mg de sodio al día danny bandar  Judit cucharadita (cdta) de sal tiene 2,300 mg de sodio  [place] puede parecer danny judit meta difícil, denny pequeños cambios en los alimentos que usted consume pueden hacer judit gran diferencia  Lawson médico o dietista puede ayudarlo a crear un plan alimenticio que cumpla lawson límite de sodio  Formas de limitar el sodio:  · Belkys las etiquetas de los alimentos  Las etiquetas pueden ayudarle a escoger alimentos bajos en sodio  La cantidad de sodio está incluida en miligramos (mg)  La columna del porcentaje de valor diario indica la cantidad de necesidades diarias satisfechas con 1 porción del alimento para cada nutriente en la lista  Escoja alimentos que tengan menos de 5% del porcentaje diario de Rahman  Estos alimentos se consideran bajos en sodio  Los alimentos que tienen 20% o más del porcentaje diario de sodio se consideran alimentos altos en sodio  Evite alimentos que tengan más de 300 mg de sodio por porción   Escoja alimentos [place] Sepideh diga que son bajos en sodio, con sodio reducido, o sin sal agregada  · Evite la sal  No le agregue sal a la comida cuando se siente a la day a comer, y agregue muy poca sal a los alimentos onel la cocción  Use hierbas y condimentos, danny cebollas, ajo y especias sin sal para agregar sabor a los alimentos  Use jugo de lima, holly o vinagre para darle a los alimentos un sabor ácido  Use chiles picantes o judit cantidad pequeña de salsa picante para agregar un sabor picante a los alimentos  · Pregunte sobre los sustitutos para la sal  Pregúntele a lawson médico si es posible usar sustitutos de la sal  Algunos sustitutos de la sal vienen con ingredientes que pueden ser dañinos si usted tiene ciertos padecimientos médicos  · Escoja los alimentos cuidadosamente cuando sale a comer a restaurantes: las comidas de los restaurantes, sobre todo restaurantes de comida rápida, savanna siempre son altas en sodio  Algunos restaurantes ofrecen información nutricional que indica la cantidad de sodio en jony alimentos  Pida que preparen jony comidas con menos sal o sin sal     Lo que necesita saber acerca de las grasas:  · Incluya grasas saludables  Las grasas insaturadas y los ácidos grasos omega-3 son ejemplos de grasas saludables  Las grasas insaturadas se encuentran en los aceites de soja, [place], [place] o [place] y la margarina [facility] y [facility]  Los ácidos grasos [place] se encuentran en el pescado con grasa, danny el salmón, el atún, la caballa y las esequiel  También se le puede encontrar en el aceita de linaza y en la linaza molida  · Evite las grasas insalubres  No consuma grasas dañinas, danny grasas saturadas y grasas trans  Las grasas saturadas se encuentran en alimentos que contienen grasa animal  Jamesetta Calvin son Lujean Sue grasosas, la [place], la [place], la crema y otros productos lácteos   Además se pueden encontrar en la [place], la margarina en gato, el aceite de bhardwaj y el aceite de crow  Las grasas trans se encuentran en comidas fritas, galletas estilo soda, tortillitas tostadas, y alimentos horneados hechos con [place] o [place]  Alimentos que puede incluir: Con el plan de alimentación DASH, usted necesita consumir un número específico de porciones de cada corinne de alimentos  [place] le ayudará a consumir las cantidades suficientes de ciertos nutrientes y limitar otros  La cantidad de porciones que usted debe comer depende de la cantidad de calorías que usted necesita  [place] dietista le informará sobre cuántas calorías necesita usted  El número de porciones que viene en la lista al lado de los grupos alimenticios a continuación es para las personas que necesitan aproximadamente 2,000 calorías al día  · Granos: 6 a 8 porciones (3 de estas porciones deben ser de alimentos de granos enteros o integrales)    ? 1 tajada de pan integral    ? 1 onza de cereal seco    ? ½ taza de cereal cocinado, pasta, o arroz integral    · Verduras y frutas: 4 a 5 porciones de frutas y 4 a 5 porciones de vegetales    ? 1 fruta mediana    ? ½ taza de vegetales congelados, enlatados (sin sal adicional), o vegetales frescos y picados    ? ½ taza de fruta fresca, congelada, seca o enlatada (enlatada en sirope liviano o jugo de fruta)    ? ½ taza de jugo de verduras o frutas    · Productos lácteos: 2 a 3 porciones    ? [address] 1%    ? 1½ onzas de queso descremado o bajo en grasas y bajo en sodio    ? 6 onzas de yogurt descremado o bajo en grasas    · Alan aquino, alan ebonie y pescado magros: 6 onzas o menos    ? Alan ebonie (medardo, pavo) sin piel    ? Pescado (sobre todo pescado con grasa, danny salmón, atún fresco o MEADE)    ? Carne de res o de cerdo magra (Will Basques extra [place])    ? Claras de huevo y sustitutos del huevo    · [place] du sac, semillas y legumbres: 4 a 5 porciones por semana    ? ½ taza de frijoles y arbejas cocinadas    ?  1½ onzas de nueces sin sal    ? 2 cucharadas de mantequilla de maní o semillas    · Dulces y azúcares adicionales: 5 o menos por semana    ? 1 cucharada de azúcar, jalea o mermelada    ? ½ taza de sorbeto o gelatina    ? 1 taza de limonada    · Grasas: 2 a 3 porciones por semana    ? 1 cucharadita de margarina suave o aceite vegetal    ? 1 cucharada de mayonesa    ? 2 cucharadas de aderezo para [facility] [employer] se deben evitar:  · Granos:     ? Postres horneados o fritos danny donas, pastelitos, galletas, y quequitos (altos en grasas y azúcar)    ? Mezclas para hacer pan de maíz y pasteles, alimentos empacados, danny rellenos para pavo, mezclas para hacer arroz y pasta, macarrones con [place]-barre, y cereales instantáneos (altos en sodio)    · Gayl Omero y verduras:     ? Vegetales regulares enlatados (altos en sodio)    ? Repollo preparado en vinagre, vegetales en vinagre, y otros alimentos preparados en escabeche (altos en sodio)    ? Vegetales fritos o vegetales en mantequilla o salsas altas en grasas    ? Gayl Omero en crema o salsa de mantequilla (altas en grasas)    · Productos lácteos:     ? Leche entera, leche semi descremada (2%), y crema (krys en grasas)    ? Queso regular y [place]-barre procesado (alto en grasa y Rahman)    · Alan y alimentos con proteínas:     ? Alan ahumadas o curadas, danny carne de stephanie en conserva, tocino, jamón, perros calientes, y salchicha (krys en grasas y [place]man)    ? Frijoles enlatados y alan enlatadas o alan en pasta, danny alan en conserva, esequiel, anchoas y mariscos de imitación (altos en sodio)    ? Alan para emparedados, danny [place], [place], [place], y tanya de res en rebanada (altos en sodio)    ? Alan altas en grasas (biste estilo [place], carne molida para hamburguesas, costillas)    ? Huevos enteros y yemas de huevo (altos en grasas)    · Otros:     ? Condimentos hechos con sal, danny sal de ajo, sal de apio, sal de cebolla, sal condimentada, suavizantes para alan, y glutamato de monosodio (MSG, por jony siglas en inglés)    ?  Sopa Miso y mezclas para sopa enlatadas o secas (altas en sodio)    ? Salsa de soya regular, salsa de [address] [facility], salsa [place], salsa para bistec, [place] Commissioner, y [facility] [address]  vinagres con sabor (altas en sodio)    ? Condimentos regulares, danny mostaza, salsa de tomate y aderezos para ensalada (altos en sodio)    ? Salsa para rehan y salsas en general, danny salsa [place] o de queso (altas en sodio y [place])    ? Bebidas altas en azúcar, danny bebidas gaseosas o jugos de frutas    ? Alimentos para merendar, danny eunice tostadas, palomitas de [place], pretzels, piel de cerdo, galletas de soda saladas, y nueces saladas    ? Alimentos congelados, danny cenas preparadas, platos principales, vegetales con salsas, y rehan cubiertas en pan (altas en sodio)    Otras pautas que debe seguir:  · Mantenga un peso saludable  Lawson riesgo de enfermedad cardíaca es aún más alto si usted tiene sobrepeso  Lawson médico podría sugerirle que adelgace si tiene sobrepeso  Usted puede perder peso si se propone consumir menos calorías y alimentos que tengan azúcar y grasas agregadas  El plan de alimentación DASH puede ayudarle a lograrlo  Herlinda buena forma de disminuir el consumo de calorías es consumiendo porciones más pequeñas en cada comida y menos meriendas entre comidas  Consulte a lawson médico para obtener más información sobre cómo adelgazar  · Realice actividad física con regularidad  El ejercicio regular puede ayudarle a alcanzar o mantener un peso saludable  El ejercicio regular también puede ayudarle a disminuir lawson presión arterial y mejorar jony niveles de colesterol  Jack ejercicios moderados por 30 minutos o más todos los días de la [place]  Para bajar peso, asegúrese de ejercitarse por lo menos 60 minutos  Consulte con lawson médico sobre un programa de ejercicio adecuado para usted  · Limite el consumo de alcohol  Las mujeres deberían limitar el consumo de alcohol a 1 bebida por día   Los hombres deberían limitar el consumo de alcohol a 2 tragos al [facility] trago equivale a 12 onzas de cerveza, 5 onzas de vino o 1 onza y ½ de licor  © Copyright [address] Information is for End User's use only and may not be sold, redistributed or otherwise used for commercial purposes  All illustrations and images included in CareNotes® are the copyrighted property of A D A NIKOLAS , [facility]  or [address] [place] [address] es sólo para uso en educación  Lawson intención no es darle un consejo médico sobre enfermedades o tratamientos  Colsulte con lawson Marita Dings farmacéutico antes de seguir cualquier régimen médico para saber si es seguro y efectivo para usted

## 2021-03-23 NOTE — ASSESSMENT & PLAN NOTE
Discussed ASCVD, diet discussed and handout provided  Due for lipid recheck in April - ordered lab and advised pt of such

## 2021-03-23 NOTE — ASSESSMENT & PLAN NOTE
Productive cough with malaise for 3 weeks suspicious for acute bacterial sinusitis vs lower respiratory tract infection  Mucinex helped w/congestion, productive cough remains  Will cover with ABX to resolve possible infection or reinfection  No fever  Discussed smoking cessation - pt declines at this time

## 2021-03-23 NOTE — PATIENT INSTRUCTIONS
Plan de alimentación con "enfoque dietético para detener la hipertensión (DASH, por jony siglas en inglés)   LO QUE NECESITA SABER:   El plan de alimentación DASH está diseñado para ayudar a prevenir o disminuir la hipertensión  También puede ayudar a bajar el colesterol brunilda (colesterol LDL) y disminuír kurtz riesgo de enfermedad cardíaca  El plan es bajo en sodio, azúcar, grasas dañinas, y grasas en kurtz totalidad  Es alto en potasio, calcio, magnesio y [place]  Estos nutrientes se agregan al consumir más frutas, vegetales y granos enteros  INSTRUCCIONES SOBRE EL ALESSANDRA HOSPITALARIA:   Kurtz límite de sodio cada día: Kurtz dietista le indicará la cantidad de sodio que usted debe consumir a diario  La gente que tiene la presión arterial alessandra debe consumir de 1,500 a 2,300 mg de sodio al día danny bandar  Judit cucharadita (cdta) de sal tiene 2,300 mg de sodio  [place] puede parecer danny judit meta difícil, denny pequeños cambios en los alimentos que usted consume pueden hacer judit gran diferencia  Kurtz médico o dietista puede ayudarlo a crear un plan alimenticio que cumpla kurtz límite de sodio  Formas de limitar el sodio:  · Belkys las etiquetas de los alimentos  Las etiquetas pueden ayudarle a escoger alimentos bajos en sodio  La cantidad de sodio está incluida en miligramos (mg)  La columna del porcentaje de valor diario indica la cantidad de necesidades diarias satisfechas con 1 porción del alimento para cada nutriente en la lista  Escoja alimentos que tengan menos de 5% del porcentaje diario de Rahman  Estos alimentos se consideran bajos en sodio  Los alimentos que tienen 20% o más del porcentaje diario de sodio se consideran alimentos altos en sodio  Evite alimentos que tengan más de 300 mg de sodio por porción  Escoja alimentos Jackey Leventhal diga que son bajos en sodio, con sodio reducido, o sin sal agregada           · Evite la sal  No le agregue sal a la comida cuando se siente a la day a comer, y agregue muy poca sal a los alimentos onel la cocción  Use hierbas y condimentos, danny cebollas, ajo y especias sin sal para agregar sabor a los alimentos  Use jugo de lima, holly o vinagre para darle a los alimentos un sabor ácido  Use chiles picantes o judit cantidad pequeña de salsa picante para agregar un sabor picante a los alimentos  · Pregunte sobre los sustitutos para la sal  Pregúntele a lawson médico si es posible usar sustitutos de la sal  Algunos sustitutos de la sal vienen con ingredientes que pueden ser dañinos si usted tiene ciertos padecimientos médicos  · Escoja los alimentos cuidadosamente cuando sale a comer a restaurantes: las comidas de los restaurantes, sobre todo restaurantes de comida rápida, savanna siempre son altas en sodio  Algunos restaurantes ofrecen información nutricional que indica la cantidad de sodio en jony alimentos  Pida que preparen jony comidas con menos sal o sin sal     Lo que necesita saber acerca de las grasas:  · Incluya grasas saludables  Las grasas insaturadas y los ácidos grasos omega-3 son ejemplos de grasas saludables  Las grasas insaturadas se encuentran en los aceites de soja, [place], [place] o [place] y la margarina Petra Avila y [facility]  Los ácidos grasos [place] 3 se encuentran en el pescado con grasa, danny el salmón, el atún, la caballa y las esequiel  También se le puede encontrar en el aceita de linaza y en la linaza molida  · Evite las grasas insalubres  No consuma grasas dañinas, danny grasas saturadas y grasas trans  Las grasas saturadas se encuentran en alimentos que contienen grasa animal  Harvey Krabbe son Barton [place] grasosas, la [place], la [facility] york, la crema y otros productos lácteos  Además se pueden encontrar en la [place], la margarina en gato, el aceite de bhardwaj y el aceite de crow  Las grasas trans se encuentran en comidas fritas, galletas estilo soda, tortillitas tostadas, y alimentos horneados hechos con [place]uda [place]      Alimentos que puede incluir: Con el plan de alimentación DASH, usted necesita consumir un número específico de porciones de cada corinne de alimentos  Hadar le ayudará a consumir las cantidades suficientes de ciertos nutrientes y limitar otros  La cantidad de porciones que usted debe comer depende de la cantidad de calorías que usted necesita  Kurtz dietista le informará sobre cuántas calorías necesita usted  El número de porciones que viene en la lista al lado de los grupos alimenticios a continuación es para las personas que necesitan aproximadamente 2,000 calorías al día  · Granos: 6 a 8 porciones (3 de estas porciones deben ser de alimentos de granos enteros o integrales)    ? 1 tajada de pan integral    ? 1 onza de cereal seco    ? ½ taza de cereal cocinado, pasta, o arroz integral    · Verduras y frutas: 4 a 5 porciones de frutas y 4 a 5 porciones de vegetales    ? 1 fruta mediana    ? ½ taza de vegetales congelados, enlatados (sin sal adicional), o vegetales frescos y picados    ? ½ taza de fruta fresca, congelada, seca o enlatada (enlatada en sirope liviano o jugo de fruta)    ? ½ taza de jugo de verduras o frutas    · Productos lácteos: 2 a 3 porciones    ? [address] 1%    ? 1½ onzas de queso descremado o bajo en grasas y bajo en sodio    ? 6 onzas de yogurt descremado o bajo en grasas    · Alan aquino, alan ebonie y pescado magros: 6 onzas o menos    ? Alan ebonie (medardo, pavo) sin piel    ? Pescado (sobre todo pescado con grasa, danny salmón, atún fresco o MEADE)    ? Carne de res o de cerdo magra (Nahum Celeste extra Kadeem Morris)    ? Claras de huevo y sustitutos del huevo    · Dorado du sac, semillas y legumbres: 4 a 5 porciones por semana    ? ½ taza de frijoles y arbejas cocinadas    ? 1½ onzas de nueces sin sal    ? 2 cucharadas de mantequilla de maní o semillas    · Dulces y azúcares adicionales: 5 o menos por semana    ? 1 cucharada de azúcar, jalea o mermelada    ? ½ taza de sorbeto o gelatina    ?  1 taza de limonada    · Grasas: 2 a 3 porciones por semana    ? 1 cucharadita de margarina suave o aceite vegetal    ? 1 cucharada de mayonesa    ? 2 cucharadas de aderezo para [facility] [employer] se deben evitar:  · Granos:     ? Postres horneados o fritos danny donas, pastelitos, galletas, y quequitos (altos en grasas y azúcar)    ? Mezclas para hacer pan de maíz y pasteles, alimentos empacados, danny rellenos para pavo, mezclas para hacer arroz y pasta, macarrones con [place], y cereales instantáneos (altos en sodio)    · Jeaneen Jiang y verduras:     ? Vegetales regulares enlatados (altos en sodio)    ? Repollo preparado en vinagre, vegetales en vinagre, y otros alimentos preparados en escabeche (altos en sodio)    ? Vegetales fritos o vegetales en mantequilla o salsas altas en grasas    ? Karrie Jiang en crema o salsa de mantequilla (altas en grasas)    · Productos lácteos:     ? Leche entera, leche semi descremada (2%), y crema (krys en grasas)    ? Queso regular y Nicole-barre procesado (alto en grasa y Rahman)    · Alan y alimentos con proteínas:     ? Alan ahumadas o curadas, danny carne de stephanie en conserva, tocino, jamón, perros calientes, y salchicha (krys en grasas y Rahman)    ? Frijoles enlatados y alan enlatadas o alan en pasta, danny alan en conserva, esequiel, anchoas y mariscos de imitación (altos en sodio)    ? Alan para emparedados, danny [place], [place], [place], y carne de res en rebanada (altos en sodio)    ? Alan altas en grasas ([facility] estilo [place], carne molida para hamburguesas, costillas)    ? Huevos enteros y yemas de huevo (altos en grasas)    · Otros:     ? Condimentos hechos con sal, danny sal de ajo, sal de apio, sal de cebolla, sal condimentada, suavizantes para alan, y glutamato de monosodio (MSG, por jony siglas en inglés)    ? Sopa Miso y mezclas para sopa enlatadas o secas (altas en sodio)    ?  Salsa de soya regular, salsa de [address], salsa [place], salsa para bistec, [employer], y la [address] (altas en sodio)    ? Condimentos regulares, danny mostaza, salsa de tomate y aderezos para ensalada (altos en sodio)    ? Salsa para rehan y salsas en general, danny salsa [place] o de queso (altas en sodio y [place])    ? Bebidas altas en azúcar, danny bebidas gaseosas o jugos de frutas    ? Alimentos para merendar, danny eunice tostadas, palomitas de [place], pretzels, piel de cerdo, galletas de soda saladas, y nueces saladas    ? Alimentos congelados, danny cenas preparadas, platos principales, vegetales con salsas, y rehan cubiertas en pan (altas en sodio)    Otras pautas que debe seguir:  · Mantenga un peso saludable  Lawson riesgo de enfermedad cardíaca es aún más alto si usted tiene sobrepeso  Lawson médico podría sugerirle que adelgace si tiene sobrepeso  Usted puede perder peso si se propone consumir menos calorías y alimentos que tengan azúcar y grasas agregadas  El plan de alimentación DASH puede ayudarle a lograrlo  Herlinda buena forma de disminuir el consumo de calorías es consumiendo porciones más pequeñas en cada comida y menos meriendas entre comidas  Consulte a lawson médico para obtener más información sobre cómo adelgazar  · Realice actividad física con regularidad  El ejercicio regular puede ayudarle a alcanzar o mantener un peso saludable  El ejercicio regular también puede ayudarle a disminuir lawson presión arterial y mejorar jony niveles de colesterol  Jack ejercicios moderados por 30 minutos o más todos los días de la [place]  Para bajar peso, asegúrese de ejercitarse por lo menos 60 minutos  Consulte con lawson médico sobre un programa de ejercicio adecuado para usted  · Limite el consumo de alcohol  Las mujeres deberían limitar el consumo de alcohol a 1 bebida por día  Los hombres deberían limitar el consumo de alcohol a 2 tragos al día  Un trago equivale a 12 onzas de cerveza, 5 onzas de vino o 1 onza y ½ de licor      © Copyright [employer] 2020 Information is for End User's use only and may not be sold, redistributed or otherwise used for commercial purposes  All illustrations and images included in CareNotes® are the copyrighted property of [employer]  or [address] [place] [address] es sólo para uso en educación  Lawson intención no es darle un consejo médico sobre enfermedades o tratamientos  Colsulte con lawson [place] Canny farmacéutico antes de seguir cualquier régimen médico para saber si es seguro y efectivo para usted

## 2021-03-23 NOTE — ASSESSMENT & PLAN NOTE
Uncontrolled  Pt states forgot medication this AM   Historically he is controlled  Discussed relation to ASCVD as well as DASH diet  Pt acknowledges understanding  Denies MI/CVD/CVA symptoms    Due for f/u next month for annual physical

## 2021-04-07 ENCOUNTER — IMMUNIZATIONS (OUTPATIENT)
Dept: FAMILY MEDICINE CLINIC | Facility: HOSPITAL | Age: 56
End: 2021-04-07

## 2021-04-07 DIAGNOSIS — Z23 ENCOUNTER FOR IMMUNIZATION: Primary | ICD-10-CM

## 2021-04-07 PROCEDURE — 0011A SARS-COV-2 / COVID-19 MRNA VACCINE (MODERNA) 100 MCG: CPT

## 2021-04-07 PROCEDURE — 91301 SARS-COV-2 / COVID-19 MRNA VACCINE (MODERNA) 100 MCG: CPT

## 2021-04-19 ENCOUNTER — OFFICE VISIT (OUTPATIENT)
Dept: SLEEP CENTER | Facility: CLINIC | Age: 56
End: 2021-04-19
Payer: COMMERCIAL

## 2021-04-19 VITALS
BODY MASS INDEX: 34.16 KG/M2 | DIASTOLIC BLOOD PRESSURE: 88 MMHG | SYSTOLIC BLOOD PRESSURE: 150 MMHG | WEIGHT: 230.6 LBS | HEIGHT: 69 IN

## 2021-04-19 DIAGNOSIS — G47.33 OSA (OBSTRUCTIVE SLEEP APNEA): Primary | ICD-10-CM

## 2021-04-19 DIAGNOSIS — Z78.9 DIFFICULTY USING CONTINUOUS POSITIVE AIRWAY PRESSURE (CPAP) DEVICE: ICD-10-CM

## 2021-04-19 PROCEDURE — 3079F DIAST BP 80-89 MM HG: CPT | Performed by: NURSE PRACTITIONER

## 2021-04-19 PROCEDURE — 3008F BODY MASS INDEX DOCD: CPT | Performed by: NURSE PRACTITIONER

## 2021-04-19 PROCEDURE — 99214 OFFICE O/P EST MOD 30 MIN: CPT | Performed by: NURSE PRACTITIONER

## 2021-04-19 PROCEDURE — 3077F SYST BP >= 140 MM HG: CPT | Performed by: NURSE PRACTITIONER

## 2021-04-19 NOTE — PROGRESS NOTES
Progress Note - [facility] [address] [place] [address] 63 y o  male   :1965, MRN: [id number]  2021          Follow Up Evaluation / Problem:     Obstructive Sleep Apnea  Obesity      Thank you for the opportunity of participating in the evaluation and care of this patient in the Sleep Clinic at [facility]  HPI: Rebecca Flores is a 63y o  year old male  The patient presents for follow up of obstructive sleep apnea  He had concern of poor sleep, including frequent night time awakenings, loud snoring, gasping and periods of apnea noted by his wife  He was also experiencing excessive daytime sleepiness  He completed a diagnostic sleep study in 2020, which indicated severe LOUIE  He then completed a CPAP titration study in February  He was set up with CPAP equipment and presents to review test results, compliance and effectiveness of treatment      Review of Systems      Genitourinary need to urinate more than twice a night   Cardiology ankle/leg swelling   Gastrointestinal none   Neurology numbness/tingling of an extremity, forgetfulness and poor concentration or confusion,    Constitutional weight change   Integumentary none   Psychiatry mood change   Musculoskeletal back pain   Pulmonary none   ENT throat clearing   Endocrine excessive thirst and frequent urination   Hematological none       Current Outpatient Medications:     amLODIPine (NORVASC) 10 mg tablet, Take 1 tablet (10 mg total) by mouth daily, Disp: 90 tablet, Rfl: 1    atorvastatin (LIPITOR) 40 mg tablet, Take 1 tablet (40 mg total) by mouth daily, Disp: 90 tablet, Rfl: 1    esomeprazole (NexIUM) 40 MG capsule, Take 1 capsule (40 mg total) by mouth daily, Disp: 90 capsule, Rfl: 1    loratadine (CLARITIN) 10 mg tablet, Take 1 tablet (10 mg total) by mouth daily, Disp: 30 tablet, Rfl: 5    losartan (COZAAR) 25 mg tablet, Take 1 tablet (25 mg total) by mouth daily, Disp: 90 tablet, Rfl: 1    [place] Sleepiness Scale  Sitting and reading: Would never doze  Watching TV: Moderate chance of dozing  Sitting, inactive in a public place (e g  a theatre or a meeting): Slight chance of dozing  As a passenger in a car for an hour without a break: Would never doze  Lying down to rest in the afternoon when circumstances permit: Would never doze  Sitting and talking to someone: Would never doze  Sitting quietly after a lunch without alcohol: Would never doze  In a car, while stopped for a few minutes in traffic: Would never doze  Total score: 3              Vitals:    04/19/21 1300   BP: 150/88   Weight: 105 kg (230 lb 9 6 oz)   Height: 5' 9" (1 753 m)       Body mass index is 34 05 kg/m²  Neck Circumference: 17       EPWORTH SLEEPINESS SCORE  Total score: 3      Past History Since Last Sleep Center Visit:   Shortly after set up of CPAP equipment, the patient developed a productive cough with congestion  Covid testing was negative  He was unable to use the new CPAP equipment, due to the cough  He had some difficulty getting used to using the equipment, due to a sense of panic when he put the mask on his face  He reports that more recently, he has been able to wear it for longer periods of time without feeling panic  He feels that his breathing, overall, has improved since he has been able to increase usage  He admits that he falls asleep on his couch at times, also contributing to non-use  The patient reports that he cleans the equipment appropriately, using mild soap and water  Results of the diagnostic sleep study, completed on 12/30/20, are as follows:  mpression         1  Severe obstructive sleep apnea   2  Severe hypoxia   3  Sleep onset and maintenance insomnia with severe sleep fragmentation   4  Possible confusional arousals     Based on the results of this study, a follow-up study to titrate positive   airway pressure is strongly recommended       Results of the CPAP titration study, completed on 2/16/21, are as follows:  IMPRESSION:   Mr  Manohar Lagunas underwent titration of CPAP to 10 cc of H2O pressure  He was still having some events during REM at that pressure  Therefore, I recommend a trial of auto-titrating CPAP 10-20 cc of H2O pressure using a large CoreOS and inkSIG Digital Eson 2 interface with heated humidification  Compliance should be evaluated in 1-2 months  There were some limb movement noted (PLM index - 10 9)  I would check iron and magnesium levels  If daytime sleepiness persists after treatment of LOUIE, I would consider a trial of Neurontin, Lyrica, Mirapex or Requip  The review of systems and following portions of the patient's history were reviewed and updated as appropriate: allergies, current medications, past family history, past medical history, past social history, past surgical history, and problem list         OBJECTIVE    PAP Pressure: Nasal AutoPAP using a lower limit of 10 cm and an upper limit of 20 cm of water pressure  Type of mask used: nasal pillow  DME Provider: [employer]    Physical Exam:     General Appearance:   Alert, cooperative, no distress, appears stated age, obese     Head:   Normocephalic, without obvious abnormality, atraumatic     Eyes:   PERRL, conjunctiva/corneas clear, EOM's intact          Nose:  Nares normal, septum midline, no drainage or sinus tenderness           Throat:  Lips, teeth and gums normal; tongue normal size and  shape and midline mucosa moist with low-lying soft palatal tissue noted, uvula normal, tonsils not visualized, Mallampati class 4       Neck:  Supple, symmetrical, trachea midline, no adenopathy;  Thyroid: No enlargement, tenderness or nodules; no carotid bruit or JVD     Lungs:      Clear to auscultation bilaterally, respirations unlabored     Heart:   Regular rate and rhythm, S1 and S2 normal, no murmur, rub or gallop       Extremities:  Extremities normal, atraumatic, no cyanosis or edema       Skin:  Skin color, texture, turgor normal, no rashes or lesions       Neurologic:  No focal deficits noted       ASSESSMENT / PLAN    1  LOUIE (obstructive sleep apnea)  PAP DME Resupply/Reorder   2  Difficulty using continuous positive airway pressure (CPAP) device             Counseling / Coordination of Care  Total clinic time spent today 35 minutes  Greater than 50% of total time was spent with the patient and / or family counseling and / or coordination of care  A description of the counseling / coordination of care:     Impressions, Diagnostic results, Prognosis, Instructions for management, Risks and benefits of treatment, Patient and family education, Risk factor reductions and Importance of compliance with treatment    Today I reviewed the patient's compliance data  he has been able to use the equipment 40% of all days recorded  Average usage was 4 or more hours 3 3% of all days recorded  The estimated AHI is 5 6 abnormal breathing events per hour  The patient feels they benefit from the use of PAP equipment and would like to continue PAP therapy  He has had difficulty adjusting to the use of the equipment  He plans to increase use  We discussed watching TV while in bed with the use of a timer, to prevent falling asleep while watching TV on his couch  Response to treatment has been improving  Adjustment will be made to the heat of the tubing, which may further improve comfort  A prescription for supplies has been given today for the next year  He will continue using this equipment at the settings noted above for the next 6 months  At that timehe will then return for a routine follow-up evaluation  I have asked the patient to contact the Sleep [facility] if he encounters any difficulties prior to that time  The following instructions have been given to the patient today:    Patient Instructions   1  Continue use of CPAP equipment nightly  2    Continue to clean your equipment, as discussed  3  Contact the Sleep [address] [place] [address] with any questions or concerns prior to your next visit, as needed  4  Schedule visit for follow-up in 6 months    You may try a different chin strap from a pharmacy, [address] or CPAP  com      Nursing Support:  When: Monday through Friday 7A-5PM except holidays  Where: Our direct line is [phone]  If you are having a true emergency please call 911  In the event that the line is busy or it is after hours please leave a voice message and we will return your call  Please speak clearly, leaving your full name, birth date, best number to reach you and the reason for your call  Medication refills: We will need the name of the medication, the dosage, the ordering provider, whether you get a 30 or 90 day refill, and the pharmacy name and address  Medications will be ordered by the provider only  Nurses cannot call in prescriptions  Please allow 7 days for medication refills  Physician requested updates: If your provider requested that you call with an update after starting medication, please be ready to provide us the medication and dosage, what time you take your medication, the time you attempt to fall asleep, time you fall asleep, when you wake up, and what time you get out of bed  Sleep Study Results: We will contact you with sleep study results and/or next steps after the physician has reviewed your testing          Maggie Torres, [address] [place] [address] FRIEND

## 2021-04-19 NOTE — PATIENT INSTRUCTIONS
1   Continue use of CPAP equipment nightly  2  Continue to clean your equipment, as discussed  3  Contact the Sleep [address] [place] [address] with any questions or concerns prior to your next visit, as needed  4  Schedule visit for follow-up in 6 months    You may try a different chin strap from a pharmacy, [address] or CPAP  com      Nursing Support:  When: Monday through Friday 7A-5PM except holidays  Where: Our direct line is [phone]  If you are having a true emergency please call 911  In the event that the line is busy or it is after hours please leave a voice message and we will return your call  Please speak clearly, leaving your full name, birth date, best number to reach you and the reason for your call  Medication refills: We will need the name of the medication, the dosage, the ordering provider, whether you get a 30 or 90 day refill, and the pharmacy name and address  Medications will be ordered by the provider only  Nurses cannot call in prescriptions  Please allow 7 days for medication refills  Physician requested updates: If your provider requested that you call with an update after starting medication, please be ready to provide us the medication and dosage, what time you take your medication, the time you attempt to fall asleep, time you fall asleep, when you wake up, and what time you get out of bed  Sleep Study Results: We will contact you with sleep study results and/or next steps after the physician has reviewed your testing

## 2021-04-20 ENCOUNTER — TELEPHONE (OUTPATIENT)
Dept: SLEEP CENTER | Facility: CLINIC | Age: 56
End: 2021-04-20

## 2021-05-10 ENCOUNTER — IMMUNIZATIONS (OUTPATIENT)
Dept: FAMILY MEDICINE CLINIC | Facility: HOSPITAL | Age: 56
End: 2021-05-10

## 2021-05-10 DIAGNOSIS — Z23 ENCOUNTER FOR IMMUNIZATION: Primary | ICD-10-CM

## 2021-05-10 PROCEDURE — 0012A SARS-COV-2 / COVID-19 MRNA VACCINE (MODERNA) 100 MCG: CPT

## 2021-05-10 PROCEDURE — 91301 SARS-COV-2 / COVID-19 MRNA VACCINE (MODERNA) 100 MCG: CPT

## 2021-05-28 ENCOUNTER — APPOINTMENT (OUTPATIENT)
Dept: LAB | Facility: HOSPITAL | Age: 56
End: 2021-05-28
Payer: COMMERCIAL

## 2021-05-28 ENCOUNTER — HOSPITAL ENCOUNTER (OUTPATIENT)
Dept: RADIOLOGY | Facility: HOSPITAL | Age: 56
Discharge: HOME | End: 2021-05-28
Payer: COMMERCIAL

## 2021-05-28 DIAGNOSIS — G89.29 CHRONIC THORACIC BACK PAIN, UNSPECIFIED BACK PAIN LATERALITY: ICD-10-CM

## 2021-05-28 DIAGNOSIS — M54.6 CHRONIC THORACIC BACK PAIN, UNSPECIFIED BACK PAIN LATERALITY: ICD-10-CM

## 2021-05-28 DIAGNOSIS — E78.2 MIXED HYPERLIPIDEMIA: ICD-10-CM

## 2021-05-28 LAB
CHOLEST SERPL-MCNC: 193 MG/DL
HDLC SERPL-MCNC: 45 MG/DL
LDLC SERPL CALC-MCNC: 132 MG/DL
NONHDLC SERPL-MCNC: 148 MG/DL
TRIGL SERPL-MCNC: 80 MG/DL

## 2021-05-28 PROCEDURE — 80061 LIPID PANEL: CPT

## 2021-05-28 PROCEDURE — 72050 X-RAY EXAM NECK SPINE 4/5VWS: CPT

## 2021-05-28 PROCEDURE — 36415 COLL VENOUS BLD VENIPUNCTURE: CPT

## 2021-05-28 PROCEDURE — 72110 X-RAY EXAM L-2 SPINE 4/>VWS: CPT

## 2021-06-02 DIAGNOSIS — R93.7 ABNORMAL X-RAY OF CERVICAL SPINE: ICD-10-CM

## 2021-06-02 DIAGNOSIS — I65.23 CAROTID ARTERY CALCIFICATION, BILATERAL: Primary | ICD-10-CM

## 2021-06-04 ENCOUNTER — NURSE TRIAGE (OUTPATIENT)
Dept: PHYSICAL THERAPY | Facility: OTHER | Age: 56
End: 2021-06-04

## 2021-06-04 DIAGNOSIS — M54.2 NECK PAIN: Primary | ICD-10-CM

## 2021-06-04 NOTE — TELEPHONE ENCOUNTER
Additional Information   Negative: Is this related to a work injury?  Negative: Is this related to an MVA?  Negative: Are you currently recieving homecare services?  Negative: Does the patient have a fever?  Negative: Has the patient had unexplained weight loss?  Negative: Is the patient experiencing blood in sputum?  Negative: Has the patient experienced major trauma? (fall from height, high speed collision, direct blow to spine) and is also experiencing nausea, light-headedness, or loss of consciousness?  Negative: Is the patient experiencing urine retention?  Negative: Is the patient experiencing acute drop foot or paralysis?  Affirmative: Is this a chronic condition? Background - Initial Assessment  Clinical complaint: left sided neck pain that radiates to bilateral shoulder area- also has hx of chronic thoracic pain  Date of onset: Pain in neck for about 1 yrs or so- denies injury- acute worsening over the past few mths  Frequency of pain: constant  Quality of pain: dull ache, sharp, stabbing and burning    Protocols used:  AMB COMPREHENSIVE SPINE PROGRAM PROTOCOL    Provider entered referral to  CS d/t acute worsening of chronic neck pain and abnormal x-ray  DDD dx  No note in EMR for nurse to read regarding treatment plan etc   Triaged for above complaints and NO RF s/s present  Referral entered for [facility] site and contact info given to him as well  Nurse also informed the patient a call will be received from the behavioral office d/t score  Agreed and understood  Referral placed in EMR for [facility]  Patient appreciative of CB  Nurse wished him well and referral closed

## 2021-06-09 ENCOUNTER — TELEPHONE (OUTPATIENT)
Dept: FAMILY MEDICINE CLINIC | Facility: CLINIC | Age: 56
End: 2021-06-09

## 2021-06-09 DIAGNOSIS — Z01.00 ROUTINE EYE EXAM: Primary | ICD-10-CM

## 2021-06-09 NOTE — TELEPHONE ENCOUNTER
Pt called in requesting a referral to see  Ophthalmology says his vision has been very blurry  Leila Pickett

## 2021-06-11 ENCOUNTER — OFFICE VISIT (OUTPATIENT)
Dept: FAMILY MEDICINE CLINIC | Facility: CLINIC | Age: 56
End: 2021-06-11

## 2021-06-11 ENCOUNTER — TELEPHONE (OUTPATIENT)
Dept: FAMILY MEDICINE CLINIC | Facility: CLINIC | Age: 56
End: 2021-06-11

## 2021-06-11 VITALS
DIASTOLIC BLOOD PRESSURE: 110 MMHG | SYSTOLIC BLOOD PRESSURE: 190 MMHG | BODY MASS INDEX: 33.62 KG/M2 | TEMPERATURE: 98 F | HEIGHT: 69 IN | HEART RATE: 88 BPM | WEIGHT: 227 LBS | RESPIRATION RATE: 16 BRPM | OXYGEN SATURATION: 98 %

## 2021-06-11 DIAGNOSIS — H53.8 BLURRY VISION: Primary | ICD-10-CM

## 2021-06-11 PROCEDURE — 3008F BODY MASS INDEX DOCD: CPT | Performed by: NURSE PRACTITIONER

## 2021-06-11 PROCEDURE — NC001 PR NO CHARGE: Performed by: FAMILY MEDICINE

## 2021-06-11 PROCEDURE — T1015 CLINIC SERVICE: HCPCS | Performed by: FAMILY MEDICINE

## 2021-06-11 NOTE — TELEPHONE ENCOUNTER
Pt returned, came to pod 1 window yelling profanities again  I told him to leave or I would be calling security   He walked away

## 2021-06-11 NOTE — TELEPHONE ENCOUNTER
Dr Tabatha Jauregui came to me to let me know he ended the visit with this pt because he was yelling at him from the moment he walked through the exam room door, profanities and all  I was going to speak with the pt but he was already walking outside, he stopped at the window and yelled "YOU SUCK! YOU SUCK!" Dr Tabatha Jauregui states he gave him the middle finger  So I called pt instead  He yelled at me from the moment he answered the phone until he hung up on me  I let him know I was calling to try to understand what happened and to assist  We went round and round for several minutes and I deemed the patient did not want my help as he kept repeating himself and not answering my questions  Pt is aware ophtho referral was placed and he will likely return for the paper script

## 2021-06-11 NOTE — PROGRESS NOTES
Chief Complaint   Patient presents with    Blurred Vision     Blood Pressure: (!) 190/110, Pulse: 88, Respirations: 16, Temperature: 98 °F (36 7 °C), Temp Source: Temporal, SpO2: 98 %, Weight - Scale: 103 kg (227 lb), Height: 5' 9" (175 3 cm), Pain Score: 0-No pain    Assessment/Plan  1  After he began yelling and swearing I simply said this visit is over and walked out of the room and proceeded down the hallway to the back  The patient exited and slammed the door  After I went to discuss it with Isaac Serna, clinical lead  He saw me through the window in pod one and started yelling that I suck and repeatedly showed a variety of hand gestures  Isaac Serna called him to find out more, which is greatly appreciated, and is documented in a separate encounter  2  Level of service determined to be non-billable encounter  The above was discussed in layman's terms, the patient was engaged using the shared-decision making process, verbalized understanding of the treatment plan, and all questions were answered to the patient's satisfaction  Diagnoses and all orders for this visit:    Blurry vision          Subjective/HPI  1  Patient is not well known to me  Seen for same day visit  2  Patient presents for lab results  He started the conversation by saying he is very upset nobody has called him  He is from [place] and the service we provide in this office is unacceptable to him  He reports that he continues to see different doctors here and was told that he needs to see a eye specialist for glaucoma based on a test done here  When he goes to see the eye specialist, he was told is nothing wrong with his eyes  He is also waiting for other results from us but was told he needs to be seen first, which he is also not happy about  He then began yelling and swearing at the institution  Review of Systems  Constitutional: Negative for activity change, appetite change, chills and fever     Respiratory: Negative for shortness of breath  Cardiovascular: Negative for chest pain  Gastrointestinal: Negative for blood in stool, nausea and vomiting  Genitourinary: Negative for difficulty urinating and dysuria  Pertinent items are noted in chief complaint and HPI  Social History   reports that he has been smoking cigars  He has been smoking about 0 25 packs per day  He has never used smokeless tobacco     reports current alcohol use  reports no history of drug use  Objective  Physical Exam  Vitals signs reviewed  Constitutional:       Appearance: Normal appearance  He is well-developed  Pulmonary:      Effort: Pulmonary effort is normal    Neurological:      Mental Status: He is oriented to person, place, and time  Vision is 20/25  Chart Review/Health Maintenance   The following have been updated: none    No Known Allergies  Current Outpatient Medications:     amLODIPine (NORVASC) 10 mg tablet, Take 1 tablet (10 mg total) by mouth daily, Disp: 90 tablet, Rfl: 1    atorvastatin (LIPITOR) 40 mg tablet, Take 1 tablet (40 mg total) by mouth daily, Disp: 90 tablet, Rfl: 1    esomeprazole (NexIUM) 40 MG capsule, Take 1 capsule (40 mg total) by mouth daily, Disp: 90 capsule, Rfl: 1    loratadine (CLARITIN) 10 mg tablet, Take 1 tablet (10 mg total) by mouth daily, Disp: 30 tablet, Rfl: 5    losartan (COZAAR) 25 mg tablet, Take 1 tablet (25 mg total) by mouth daily, Disp: 90 tablet, Rfl: 1    Patient Active Problem List   Diagnosis    Essential hypertension    LOUIE (obstructive sleep apnea)    Chronic thoracic back pain    Encounter for screening colonoscopy    Class 2 obesity in adult    Mixed hyperlipidemia    Gastroesophageal reflux disease    Acute bronchitis    Difficulty using continuous positive airway pressure (CPAP) device     Past Surgical History:   Procedure Laterality Date    BACK SURGERY  2009    COLONOSCOPY       No family history on file    Health Maintenance   Topic Date Due    Pneumococcal Vaccine: Pediatrics (0 to 5 Years) and At-Risk Patients (6 to 59 Years) (1 of 1 - PPSV23) Never done    BMI: Followup Plan  Never done    Annual Physical  Never done    DTaP,Tdap,and Td Vaccines (1 - Tdap) Never done    PT PLAN OF CARE  02/27/2021    Influenza Vaccine (Season Ended) 09/01/2021    Depression Screening PHQ  06/11/2022    BMI: Adult  06/11/2022    Colonoscopy Surveillance  01/11/2024    Colorectal Cancer Screening  01/11/2031    HIV Screening  Completed    Hepatitis C Screening  Completed    COVID-19 Vaccine  Completed    HIB Vaccine  Aged Out    Hepatitis B Vaccine  Aged Out    IPV Vaccine  Aged Out    Hepatitis A Vaccine  Aged Out    Meningococcal ACWY Vaccine  Aged Out    HPV Vaccine  Aged Out

## 2021-07-21 ENCOUNTER — TELEPHONE (OUTPATIENT)
Dept: SLEEP CENTER | Facility: CLINIC | Age: 56
End: 2021-07-21

## 2021-07-21 NOTE — TELEPHONE ENCOUNTER
Patient left message, states he would like to know about CPAP    Attempted to contact patient, left message to call office

## 2021-07-27 NOTE — TELEPHONE ENCOUNTER
Returned the patient's call  Continuous Positive Airway Pressure (CPAP) therapy was prescribed to you as a medical necessity and there are risks of discontinuing  use of the device, some of which may be long term  Symptoms you experienced before using CPAP may return such as snoring, apneas, excessive daytime sleepiness, hypertension, cardiac arrhythmias, risk of stroke, congestive heart-failure, exacerbation of COPD and potential respiratory failure  Ultimately, it is a personal decision for you to make if you continue use of an affected device or discontinue until a replacement is provided  Unfortunately, [employer] has not yet provided us with information about available devices  You can visit their website at [email or web address] to register your device and to learn more about how [employer] plans to replace your device  Another option would be to check with your medical equipment provider to determine if you are eligible for a new machine through your insurance, if not you can pay out of pocket for a new machine  We are able to provide you with a script, please include your mask type

## 2021-10-08 ENCOUNTER — APPOINTMENT (OUTPATIENT)
Dept: LAB | Facility: HOSPITAL | Age: 56
End: 2021-10-08
Attending: PODIATRIST
Payer: COMMERCIAL

## 2021-10-08 DIAGNOSIS — L60.3 MEDIAN CANALIFORM NAIL DYSTROPHY: ICD-10-CM

## 2021-10-08 LAB
ALBUMIN SERPL BCP-MCNC: 4.7 G/DL (ref 3–5.2)
ALP SERPL-CCNC: 68 U/L (ref 43–122)
ALT SERPL W P-5'-P-CCNC: 31 U/L
AST SERPL W P-5'-P-CCNC: 27 U/L (ref 17–59)
BASOPHILS # BLD AUTO: 0.1 THOUSANDS/ΜL (ref 0–0.1)
BASOPHILS NFR BLD AUTO: 1 % (ref 0–1)
BILIRUB DIRECT SERPL-MCNC: 0.14 MG/DL
BILIRUB SERPL-MCNC: 0.38 MG/DL
EOSINOPHIL # BLD AUTO: 0.1 THOUSAND/ΜL (ref 0–0.4)
EOSINOPHIL NFR BLD AUTO: 2 % (ref 0–6)
ERYTHROCYTE [DISTWIDTH] IN BLOOD BY AUTOMATED COUNT: 15 %
HCT VFR BLD AUTO: 48.1 % (ref 41–53)
HGB BLD-MCNC: 15.6 G/DL (ref 13.5–17.5)
LYMPHOCYTES # BLD AUTO: 1.6 THOUSANDS/ΜL (ref 0.5–4)
LYMPHOCYTES NFR BLD AUTO: 24 % (ref 25–45)
MCH RBC QN AUTO: 27 PG (ref 26–34)
MCHC RBC AUTO-ENTMCNC: 32.5 G/DL (ref 31–36)
MCV RBC AUTO: 83 FL (ref 80–100)
MONOCYTES # BLD AUTO: 0.5 THOUSAND/ΜL (ref 0.2–0.9)
MONOCYTES NFR BLD AUTO: 8 % (ref 1–10)
NEUTROPHILS # BLD AUTO: 4.4 THOUSANDS/ΜL (ref 1.8–7.8)
NEUTS SEG NFR BLD AUTO: 66 % (ref 45–65)
PLATELET # BLD AUTO: 294 THOUSANDS/UL (ref 150–450)
PMV BLD AUTO: 8.9 FL (ref 8.9–12.7)
PROT SERPL-MCNC: 8.1 G/DL (ref 5.9–8.4)
RBC # BLD AUTO: 5.79 MILLION/UL (ref 4.5–5.9)
WBC # BLD AUTO: 6.7 THOUSAND/UL (ref 4.5–11)

## 2021-10-08 PROCEDURE — 85025 COMPLETE CBC W/AUTO DIFF WBC: CPT

## 2021-10-08 PROCEDURE — 80076 HEPATIC FUNCTION PANEL: CPT

## 2021-10-08 PROCEDURE — 36415 COLL VENOUS BLD VENIPUNCTURE: CPT

## 2021-11-24 DIAGNOSIS — I10 ESSENTIAL HYPERTENSION: ICD-10-CM

## 2021-11-29 RX ORDER — AMLODIPINE BESYLATE 10 MG/1
TABLET ORAL
Qty: 90 TABLET | Refills: 1 | Status: SHIPPED | OUTPATIENT
Start: 2021-11-29 | End: 2022-02-02 | Stop reason: SDUPTHER

## 2021-11-29 RX ORDER — LOSARTAN POTASSIUM 25 MG/1
TABLET ORAL
Qty: 90 TABLET | Refills: 1 | Status: SHIPPED | OUTPATIENT
Start: 2021-11-29 | End: 2022-02-02 | Stop reason: SDUPTHER

## 2021-12-08 ENCOUNTER — OFFICE VISIT (OUTPATIENT)
Dept: FAMILY MEDICINE CLINIC | Facility: CLINIC | Age: 56
End: 2021-12-08

## 2021-12-08 VITALS
OXYGEN SATURATION: 96 % | SYSTOLIC BLOOD PRESSURE: 148 MMHG | HEART RATE: 86 BPM | RESPIRATION RATE: 16 BRPM | WEIGHT: 233 LBS | DIASTOLIC BLOOD PRESSURE: 80 MMHG | TEMPERATURE: 97.8 F | HEIGHT: 69 IN | BODY MASS INDEX: 34.51 KG/M2

## 2021-12-08 DIAGNOSIS — R53.83 OTHER FATIGUE: ICD-10-CM

## 2021-12-08 DIAGNOSIS — G89.29 CHRONIC MIDLINE LOW BACK PAIN WITHOUT SCIATICA: Primary | ICD-10-CM

## 2021-12-08 DIAGNOSIS — M54.6 CHRONIC THORACIC BACK PAIN, UNSPECIFIED BACK PAIN LATERALITY: ICD-10-CM

## 2021-12-08 DIAGNOSIS — M54.50 CHRONIC MIDLINE LOW BACK PAIN WITHOUT SCIATICA: Primary | ICD-10-CM

## 2021-12-08 DIAGNOSIS — M16.12 PRIMARY OSTEOARTHRITIS OF LEFT HIP: ICD-10-CM

## 2021-12-08 DIAGNOSIS — Z23 NEEDS FLU SHOT: ICD-10-CM

## 2021-12-08 DIAGNOSIS — G89.29 CHRONIC THORACIC BACK PAIN, UNSPECIFIED BACK PAIN LATERALITY: ICD-10-CM

## 2021-12-08 PROCEDURE — 99214 OFFICE O/P EST MOD 30 MIN: CPT | Performed by: NURSE PRACTITIONER

## 2021-12-08 PROCEDURE — 3079F DIAST BP 80-89 MM HG: CPT | Performed by: NURSE PRACTITIONER

## 2021-12-08 PROCEDURE — 90471 IMMUNIZATION ADMIN: CPT | Performed by: NURSE PRACTITIONER

## 2021-12-08 PROCEDURE — 3077F SYST BP >= 140 MM HG: CPT | Performed by: NURSE PRACTITIONER

## 2021-12-08 PROCEDURE — 90682 RIV4 VACC RECOMBINANT DNA IM: CPT | Performed by: NURSE PRACTITIONER

## 2021-12-08 RX ORDER — IBUPROFEN 800 MG/1
800 TABLET ORAL EVERY 6 HOURS PRN
Qty: 30 TABLET | Refills: 0 | Status: SHIPPED | OUTPATIENT
Start: 2021-12-08

## 2021-12-09 ENCOUNTER — APPOINTMENT (OUTPATIENT)
Dept: LAB | Facility: CLINIC | Age: 56
End: 2021-12-09
Payer: COMMERCIAL

## 2021-12-09 DIAGNOSIS — R53.83 OTHER FATIGUE: ICD-10-CM

## 2021-12-09 LAB
EST. AVERAGE GLUCOSE BLD GHB EST-MCNC: 111 MG/DL
HBA1C MFR BLD: 5.5 %

## 2021-12-09 PROCEDURE — 83036 HEMOGLOBIN GLYCOSYLATED A1C: CPT

## 2021-12-09 PROCEDURE — 84403 ASSAY OF TOTAL TESTOSTERONE: CPT

## 2021-12-09 PROCEDURE — 84402 ASSAY OF FREE TESTOSTERONE: CPT

## 2021-12-09 PROCEDURE — 36415 COLL VENOUS BLD VENIPUNCTURE: CPT

## 2021-12-10 ENCOUNTER — NURSE TRIAGE (OUTPATIENT)
Dept: PHYSICAL THERAPY | Facility: OTHER | Age: 56
End: 2021-12-10

## 2021-12-10 DIAGNOSIS — M54.42 CHRONIC LEFT-SIDED LOW BACK PAIN WITH LEFT-SIDED SCIATICA: Primary | ICD-10-CM

## 2021-12-10 DIAGNOSIS — G89.29 CHRONIC LEFT-SIDED LOW BACK PAIN WITH LEFT-SIDED SCIATICA: Primary | ICD-10-CM

## 2021-12-10 LAB
TESTOST FREE SERPL-MCNC: 10.5 PG/ML (ref 7.2–24)
TESTOST SERPL-MCNC: 410 NG/DL (ref 264–916)

## 2021-12-13 ENCOUNTER — EVALUATION (OUTPATIENT)
Dept: PHYSICAL THERAPY | Facility: CLINIC | Age: 56
End: 2021-12-13
Payer: COMMERCIAL

## 2021-12-13 DIAGNOSIS — M16.12 PRIMARY OSTEOARTHRITIS OF LEFT HIP: ICD-10-CM

## 2021-12-13 DIAGNOSIS — G89.29 CHRONIC LEFT-SIDED LOW BACK PAIN WITH LEFT-SIDED SCIATICA: Primary | ICD-10-CM

## 2021-12-13 DIAGNOSIS — M54.42 CHRONIC LEFT-SIDED LOW BACK PAIN WITH LEFT-SIDED SCIATICA: Primary | ICD-10-CM

## 2021-12-13 PROCEDURE — 97162 PT EVAL MOD COMPLEX 30 MIN: CPT

## 2021-12-13 PROCEDURE — 97110 THERAPEUTIC EXERCISES: CPT

## 2021-12-15 ENCOUNTER — OFFICE VISIT (OUTPATIENT)
Dept: PHYSICAL THERAPY | Facility: CLINIC | Age: 56
End: 2021-12-15
Payer: COMMERCIAL

## 2021-12-15 DIAGNOSIS — G89.29 CHRONIC LEFT-SIDED LOW BACK PAIN WITH LEFT-SIDED SCIATICA: Primary | ICD-10-CM

## 2021-12-15 DIAGNOSIS — M16.12 PRIMARY OSTEOARTHRITIS OF LEFT HIP: ICD-10-CM

## 2021-12-15 DIAGNOSIS — M54.42 CHRONIC LEFT-SIDED LOW BACK PAIN WITH LEFT-SIDED SCIATICA: Primary | ICD-10-CM

## 2021-12-15 PROCEDURE — 97112 NEUROMUSCULAR REEDUCATION: CPT

## 2021-12-15 PROCEDURE — 97110 THERAPEUTIC EXERCISES: CPT

## 2021-12-15 PROCEDURE — 97530 THERAPEUTIC ACTIVITIES: CPT

## 2021-12-20 ENCOUNTER — OFFICE VISIT (OUTPATIENT)
Dept: PHYSICAL THERAPY | Facility: CLINIC | Age: 56
End: 2021-12-20
Payer: COMMERCIAL

## 2021-12-20 DIAGNOSIS — G89.29 CHRONIC LEFT-SIDED LOW BACK PAIN WITH LEFT-SIDED SCIATICA: ICD-10-CM

## 2021-12-20 DIAGNOSIS — M54.42 CHRONIC LEFT-SIDED LOW BACK PAIN WITH LEFT-SIDED SCIATICA: ICD-10-CM

## 2021-12-20 DIAGNOSIS — M16.12 PRIMARY OSTEOARTHRITIS OF LEFT HIP: Primary | ICD-10-CM

## 2021-12-20 PROCEDURE — 97112 NEUROMUSCULAR REEDUCATION: CPT

## 2021-12-20 PROCEDURE — 97530 THERAPEUTIC ACTIVITIES: CPT

## 2021-12-20 PROCEDURE — 97110 THERAPEUTIC EXERCISES: CPT

## 2021-12-22 ENCOUNTER — OFFICE VISIT (OUTPATIENT)
Dept: PHYSICAL THERAPY | Facility: CLINIC | Age: 56
End: 2021-12-22
Payer: COMMERCIAL

## 2021-12-22 DIAGNOSIS — M54.42 CHRONIC LEFT-SIDED LOW BACK PAIN WITH LEFT-SIDED SCIATICA: ICD-10-CM

## 2021-12-22 DIAGNOSIS — G89.29 CHRONIC LEFT-SIDED LOW BACK PAIN WITH LEFT-SIDED SCIATICA: ICD-10-CM

## 2021-12-22 DIAGNOSIS — M16.12 PRIMARY OSTEOARTHRITIS OF LEFT HIP: Primary | ICD-10-CM

## 2021-12-22 PROCEDURE — 97110 THERAPEUTIC EXERCISES: CPT

## 2021-12-22 PROCEDURE — 97112 NEUROMUSCULAR REEDUCATION: CPT

## 2021-12-22 PROCEDURE — 97530 THERAPEUTIC ACTIVITIES: CPT

## 2021-12-27 ENCOUNTER — OFFICE VISIT (OUTPATIENT)
Dept: PHYSICAL THERAPY | Facility: CLINIC | Age: 56
End: 2021-12-27
Payer: COMMERCIAL

## 2021-12-27 DIAGNOSIS — M54.42 CHRONIC LEFT-SIDED LOW BACK PAIN WITH LEFT-SIDED SCIATICA: ICD-10-CM

## 2021-12-27 DIAGNOSIS — M16.12 PRIMARY OSTEOARTHRITIS OF LEFT HIP: Primary | ICD-10-CM

## 2021-12-27 DIAGNOSIS — G89.29 CHRONIC LEFT-SIDED LOW BACK PAIN WITH LEFT-SIDED SCIATICA: ICD-10-CM

## 2021-12-27 PROCEDURE — 97110 THERAPEUTIC EXERCISES: CPT

## 2021-12-27 PROCEDURE — 97112 NEUROMUSCULAR REEDUCATION: CPT

## 2021-12-27 PROCEDURE — 97530 THERAPEUTIC ACTIVITIES: CPT

## 2021-12-29 ENCOUNTER — OFFICE VISIT (OUTPATIENT)
Dept: PHYSICAL THERAPY | Facility: CLINIC | Age: 56
End: 2021-12-29
Payer: COMMERCIAL

## 2021-12-29 DIAGNOSIS — G89.29 CHRONIC LEFT-SIDED LOW BACK PAIN WITH LEFT-SIDED SCIATICA: ICD-10-CM

## 2021-12-29 DIAGNOSIS — M54.42 CHRONIC LEFT-SIDED LOW BACK PAIN WITH LEFT-SIDED SCIATICA: ICD-10-CM

## 2021-12-29 DIAGNOSIS — M16.12 PRIMARY OSTEOARTHRITIS OF LEFT HIP: Primary | ICD-10-CM

## 2021-12-29 PROCEDURE — 97530 THERAPEUTIC ACTIVITIES: CPT

## 2021-12-29 PROCEDURE — 97112 NEUROMUSCULAR REEDUCATION: CPT

## 2021-12-29 PROCEDURE — 97110 THERAPEUTIC EXERCISES: CPT

## 2022-01-03 ENCOUNTER — OFFICE VISIT (OUTPATIENT)
Dept: PHYSICAL THERAPY | Facility: CLINIC | Age: 57
End: 2022-01-03
Payer: COMMERCIAL

## 2022-01-03 DIAGNOSIS — M54.42 CHRONIC LEFT-SIDED LOW BACK PAIN WITH LEFT-SIDED SCIATICA: ICD-10-CM

## 2022-01-03 DIAGNOSIS — M16.12 PRIMARY OSTEOARTHRITIS OF LEFT HIP: Primary | ICD-10-CM

## 2022-01-03 DIAGNOSIS — G89.29 CHRONIC LEFT-SIDED LOW BACK PAIN WITH LEFT-SIDED SCIATICA: ICD-10-CM

## 2022-01-03 PROCEDURE — 97110 THERAPEUTIC EXERCISES: CPT

## 2022-01-03 PROCEDURE — 97112 NEUROMUSCULAR REEDUCATION: CPT

## 2022-01-03 PROCEDURE — 97530 THERAPEUTIC ACTIVITIES: CPT

## 2022-01-03 NOTE — PROGRESS NOTES
Daily Note     Today's date: 1/3/2022  Patient name: Yung Hansen  : 1965  MRN: [id number]  Referring provider: Sarah King PT  Dx:   Encounter Diagnosis     ICD-10-CM    1  Primary osteoarthritis of left hip  M16 12    2  Chronic left-sided low back pain with left-sided sciatica  M54 42     G89 29        Start Time: 1211  Stop Time: 1259  Total time in clinic (min): 48 minutes    Subjective: Pt reports his back feels good today but he has some L hip pain  Objective: See treatment diary below      Assessment: Tolerated treatment well  Patient shows improvement with increase in FOTO score to 47/52  Prone hip extension was added to exercises with pt displaying weakness and fatigue, requiring increased rest breaks between repetitions  Birddogs were also added to exercises with pt displaying good form following initial cuing; pt required increased rest breaks in between sets  Pt displayed increased weakness in L hamstring during seated HS curls, which were progressed with increased resistance  Toe walks were improved this session with pt requiring decreased rest breaks during exercise  Pt required increased rest breaks with step downs on the LLE, subjectively reporting increased difficulty compared to the RLE  Continue to progress pt as tolerated next visit  Plan: Continue per plan of care        Precautions: GERD, LOUIE, HTN, obesity, hyperlipidemia  Manuals 12/13 12/15 12/20 12/22 12/27 12/29 1/3                                             FOTO      nv       Neuro Re-Ed 12/13 12/15 12/20 12/22 12/27 12/29 1/3      Sciatic nerve glides 1x10; HEP            Hooklying clamshells nv 2x10 ptb 1x15 gtb 2x10 maroon tb         Bridges nv 2x10 1x15 2x10 pball 1x15 1x15 pball       Paloff press     1x10 each side 10#         TA pball press    1x15         sidelying abduction     1x10 each side 1x15 each side 1x15 each side; progress with wt nv      birddogs       2x10       Prone hip ext       1x10 each side      Ther Ex 12/13 12/15 12/20 12/22 12/27 12/29 1/3      Prone press ups 1x10; HEP            Lumbar extension on wall 1x10; HEP    1x10 with pt overpressure        bike  5' L3 8'  L3 6' L3 8' L4 8' L3 8'      Standing marches with resistance nv            Leg press nv  S3 F9  75#x10  95#x10  105#x10          Lateral walks nv  gtb back and forth at mirror 5x          Seated HS curls nv 1x10 ptb each side     Btb 1x10 each side      Resisted DF nv   ptb 1x10 each btb 1x10 LLE        Heel raises nv            Ther Activity 12/13 12/15 12/20 12/22 12/27 12/29 1/3      Toe walks      2x24' 2x24'      Heel walks nv   1x28' 2x28' 2x24' 2x24'      Lateral step ups nv  [id number]  1R 1x10 each side step down  1R step downs 1x10 each side      Sit to stand nv 1x15 8# 10#x25 20" box 2x10 10#         Gait Training 12/13 12/15 12/20 12/22 12/27 12/29 1/3                                Modalities 12/13 12/15 12/20 12/22 12/27 12/29 1/3

## 2022-01-05 ENCOUNTER — OFFICE VISIT (OUTPATIENT)
Dept: PHYSICAL THERAPY | Facility: CLINIC | Age: 57
End: 2022-01-05
Payer: COMMERCIAL

## 2022-01-05 DIAGNOSIS — M54.42 CHRONIC LEFT-SIDED LOW BACK PAIN WITH LEFT-SIDED SCIATICA: ICD-10-CM

## 2022-01-05 DIAGNOSIS — G89.29 CHRONIC LEFT-SIDED LOW BACK PAIN WITH LEFT-SIDED SCIATICA: ICD-10-CM

## 2022-01-05 DIAGNOSIS — M16.12 PRIMARY OSTEOARTHRITIS OF LEFT HIP: Primary | ICD-10-CM

## 2022-01-05 PROCEDURE — 97110 THERAPEUTIC EXERCISES: CPT

## 2022-01-05 NOTE — PROGRESS NOTES
Daily Note     Today's date: 2022  Patient name: Rebecca Flores  : 1965  MRN: [id number]  Referring provider: Naoh Jane, PT  Dx:   Encounter Diagnosis     ICD-10-CM    1  Primary osteoarthritis of left hip  M16 12    2  Chronic left-sided low back pain with left-sided sciatica  M54 42     G89 29        Start Time: 1508  Stop Time: 1516  Total time in clinic (min): 8 minutes    Subjective: Pt reported to therapy, but only was able to complete the bike before leaving due to sickness  Objective: See treatment diary below      Assessment: Pt left treatment after riding the bike due to feeling sick  Continue to progress pt as tolerated next visit  Plan: Continue per plan of care        Precautions: GERD, LOUIE, HTN, obesity, hyperlipidemia  Manuals 12/13 12/15 12/20 12/22 12/27 12/29 1/3 1/5                                            FOTO      nv       Neuro Re-Ed 12/13 12/15 12/20 12/22 12/27 12/29 1/3 1/5     Sciatic nerve glides 1x10; HEP            Hooklying clamshells nv 2x10 ptb 1x15 gtb 2x10 maroon tb         Bridges nv 2x10 1x15 2x10 pball 1x15 1x15 pball       Paloff press     1x10 each side 10#         TA pball press    1x15         sidelying abduction     1x10 each side 1x15 each side 1x15 each side; progress with wt nv      birddogs       2x10       Prone hip ext       1x10 each side      Ther Ex 12/13 12/15 12/20 12/22 12/27 12/29 1/3 1/5     Prone press ups 1x10; HEP            Lumbar extension on wall 1x10; HEP    1x10 with pt overpressure        bike  5' L3 8'  L3 6' L3 8' L4 8' L3 8' L4 8'     Standing marches with resistance nv            Leg press nv  S3 F9  75#x10  95#x10  105#x10          Lateral walks nv  gtb back and forth at mirror 5x          Seated HS curls nv 1x10 ptb each side     Btb 1x10 each side      Resisted DF nv   ptb 1x10 each btb 1x10 LLE        Heel raises nv            Ther Activity 12/13 12/15 12/20 12/22 12/27 12/29 1/3 1/5     Toe walks      2x24' 2x24'      Heel walks nv   1x28' 2x28' 2x24' 2x24'      Lateral step ups nv  [id number]  1R 1x10 each side step down  1R step downs 1x10 each side      Sit to stand nv 1x15 8# 10#x25 20" box 2x10 10#         Gait Training 12/13 12/15 12/20 12/22 12/27 12/29 1/3 1/5                               Modalities 12/13 12/15 12/20 12/22 12/27 12/29 1/3 1/5

## 2022-01-17 ENCOUNTER — APPOINTMENT (OUTPATIENT)
Dept: PHYSICAL THERAPY | Facility: CLINIC | Age: 57
End: 2022-01-17
Payer: COMMERCIAL

## 2022-01-19 ENCOUNTER — APPOINTMENT (OUTPATIENT)
Dept: PHYSICAL THERAPY | Facility: CLINIC | Age: 57
End: 2022-01-19
Payer: COMMERCIAL

## 2022-02-02 ENCOUNTER — TELEPHONE (OUTPATIENT)
Dept: FAMILY MEDICINE CLINIC | Facility: CLINIC | Age: 57
End: 2022-02-02

## 2022-02-02 DIAGNOSIS — I10 ESSENTIAL HYPERTENSION: ICD-10-CM

## 2022-02-02 DIAGNOSIS — K21.9 GASTROESOPHAGEAL REFLUX DISEASE, UNSPECIFIED WHETHER ESOPHAGITIS PRESENT: ICD-10-CM

## 2022-02-03 RX ORDER — ESOMEPRAZOLE MAGNESIUM 40 MG/1
CAPSULE, DELAYED RELEASE ORAL
Qty: 90 CAPSULE | Refills: 0 | Status: SHIPPED | OUTPATIENT
Start: 2022-02-03 | End: 2022-02-04 | Stop reason: SDUPTHER

## 2022-02-03 RX ORDER — LOSARTAN POTASSIUM 25 MG/1
25 TABLET ORAL DAILY
Qty: 90 TABLET | Refills: 1 | Status: SHIPPED | OUTPATIENT
Start: 2022-02-03 | End: 2022-02-04 | Stop reason: SDUPTHER

## 2022-02-03 RX ORDER — AMLODIPINE BESYLATE 10 MG/1
10 TABLET ORAL DAILY
Qty: 90 TABLET | Refills: 1 | Status: SHIPPED | OUTPATIENT
Start: 2022-02-03 | End: 2022-02-04 | Stop reason: SDUPTHER

## 2022-04-21 ENCOUNTER — OFFICE VISIT (OUTPATIENT)
Dept: FAMILY MEDICINE CLINIC | Facility: CLINIC | Age: 57
End: 2022-04-21

## 2022-04-21 VITALS
DIASTOLIC BLOOD PRESSURE: 70 MMHG | RESPIRATION RATE: 16 BRPM | WEIGHT: 225 LBS | OXYGEN SATURATION: 96 % | TEMPERATURE: 97.1 F | BODY MASS INDEX: 33.33 KG/M2 | HEART RATE: 79 BPM | HEIGHT: 69 IN | SYSTOLIC BLOOD PRESSURE: 148 MMHG

## 2022-04-21 DIAGNOSIS — R45.86 MOOD CHANGES: Primary | ICD-10-CM

## 2022-04-21 DIAGNOSIS — R35.1 NOCTURIA: ICD-10-CM

## 2022-04-21 PROCEDURE — 99213 OFFICE O/P EST LOW 20 MIN: CPT | Performed by: FAMILY MEDICINE

## 2022-04-21 PROCEDURE — 3078F DIAST BP <80 MM HG: CPT | Performed by: FAMILY MEDICINE

## 2022-04-21 PROCEDURE — 3077F SYST BP >= 140 MM HG: CPT | Performed by: FAMILY MEDICINE

## 2022-04-21 NOTE — ASSESSMENT & PLAN NOTE
Patient with increased frequency or urination at night;  Also as intermittent loss of urine stream;  Denies any burning or blood in the urine  Fam hx of prostate cancer in father  PSA 2 years ago normal  Patient prefers to see urology for management  - Offered TWYLA however declined today  - PSA ordered  - Referral to urology placed    - Patient counseled on PSA levels and potential for further diagnostic  examinations

## 2022-04-21 NOTE — ASSESSMENT & PLAN NOTE
Patient unable to explain howver he states that is in a bad relationship with his father who currently lives with him and his wife  He prefers to speak to  or therapist regarding the concerns  He denies any depressed mood  PHQ-9 of 1 today  He denies any SI or HI  Patient feels safe at home  - Referral to therapist and social work placed today

## 2022-04-21 NOTE — PROGRESS NOTES
Assessment/Plan:    Mood changes  Patient unable to explain howver he states that is in a bad relationship with his father who currently lives with him and his wife  He prefers to speak to  or therapist regarding the concerns  He denies any depressed mood  PHQ-9 of 1 today  He denies any SI or HI  Patient feels safe at home  - Referral to therapist and social work placed today  Nocturia  Patient with increased frequency or urination at night;  Also as intermittent loss of urine stream;  Denies any burning or blood in the urine  Fam hx of prostate cancer in father  PSA 2 years ago normal  Patient prefers to see urology for management  - Offered TWYLA however declined today  - PSA ordered  - Referral to urology placed  - Patient counseled on PSA levels and potential for further diagnostic  examinations       Diagnoses and all orders for this visit:    Mood changes  -     Ambulatory Referral to Social Work Care Management Program; Future  -     Ambulatory Referral to [facility]; Future    Nocturia  -     PSA, Total Screen; Future  -     Ambulatory Referral to Urology; Future          Subjective:      Patient ID: Shanna Atkinson is a 61 y o  male  Patient is a very pleasant 79-year-old male who presents to the clinic to obtain referral to a therapist as he is currently having a bad relationship with his father who currently lives with him and his wife  Patient declined to provide any details however he denies any depressed mood, feel safe at home, and denies any suicidal or homicidal ideations  He also complains of increased urinary frequency at night with the patient urinating multiple times  His last A1c was normal 4 months ago  He is concerned as his father does have history of prostate cancer  He declines digital rectal exam today but prefers to be seen by urologist and prefers to have blood work done for diagnostic purposes        The following portions of the patient's history were reviewed and updated as appropriate:   He  has a past medical history of Colon polyp, GERD (gastroesophageal reflux disease), Hyperlipidemia, Hypertension, and Sleep apnea  He   Patient Active Problem List    Diagnosis Date Noted    Mood changes 04/21/2022    Nocturia 04/21/2022    Primary osteoarthritis of left hip 12/08/2021    BMI 34 0-34 9,adult 12/08/2021    Difficulty using continuous positive airway pressure (CPAP) device 04/19/2021    Acute bronchitis 03/23/2021    Gastroesophageal reflux disease 12/28/2020    Class 2 obesity in adult 11/11/2020    Mixed hyperlipidemia 11/11/2020    Essential hypertension 10/19/2020    LOUIE (obstructive sleep apnea) 10/19/2020    Chronic midline low back pain 10/19/2020    Encounter for screening colonoscopy 10/19/2020     He  has a past surgical history that includes Back surgery (2009) and Colonoscopy  His family history is not on file  He  reports that he has been smoking cigars  He has been smoking about 0 25 packs per day  He has never used smokeless tobacco  He reports current alcohol use  He reports that he does not use drugs  Current Outpatient Medications   Medication Sig Dispense Refill    amLODIPine (NORVASC) 10 mg tablet Take 1 tablet (10 mg total) by mouth daily 90 tablet 1    atorvastatin (LIPITOR) 40 mg tablet Take 1 tablet (40 mg total) by mouth daily 90 tablet 1    esomeprazole (NexIUM) 40 MG capsule Take one capsule (40mg) by mouth daily  90 capsule 0    ibuprofen (MOTRIN) 800 mg tablet Take 1 tablet (800 mg total) by mouth every 6 (six) hours as needed for moderate pain 30 tablet 0    loratadine (CLARITIN) 10 mg tablet Take 1 tablet (10 mg total) by mouth daily 30 tablet 5    losartan (COZAAR) 25 mg tablet Take 1 tablet (25 mg total) by mouth daily 90 tablet 1     No current facility-administered medications for this visit       Current Outpatient Medications on File Prior to Visit   Medication Sig    amLODIPine (NORVASC) 10 mg tablet Take 1 tablet (10 mg total) by mouth daily    atorvastatin (LIPITOR) 40 mg tablet Take 1 tablet (40 mg total) by mouth daily    esomeprazole (NexIUM) 40 MG capsule Take one capsule (40mg) by mouth daily   ibuprofen (MOTRIN) 800 mg tablet Take 1 tablet (800 mg total) by mouth every 6 (six) hours as needed for moderate pain    loratadine (CLARITIN) 10 mg tablet Take 1 tablet (10 mg total) by mouth daily    losartan (COZAAR) 25 mg tablet Take 1 tablet (25 mg total) by mouth daily     No current facility-administered medications on file prior to visit  He has No Known Allergies       Review of Systems   Genitourinary: Positive for urgency  Negative for dysuria and enuresis  No incontinence  Decrease urine stream   Nocturia  Objective:      /70 (BP Location: Left arm, Patient Position: Sitting, Cuff Size: Large)   Pulse 79   Temp (!) 97 1 °F (36 2 °C) (Temporal)   Resp 16   Ht 5' 9" (1 753 m)   Wt 102 kg (225 lb)   SpO2 96%   BMI 33 23 kg/m²          Physical Exam  Constitutional:       General: He is not in acute distress  Appearance: Normal appearance  He is well-developed and normal weight  He is not ill-appearing, toxic-appearing or diaphoretic  HENT:      Head: Normocephalic and atraumatic  Right Ear: External ear normal       Left Ear: External ear normal       Nose: Nose normal       Mouth/Throat:      Mouth: Mucous membranes are moist       Pharynx: No oropharyngeal exudate or posterior oropharyngeal erythema  Eyes:      General: No scleral icterus  Right eye: No discharge  Left eye: No discharge  Extraocular Movements: Extraocular movements intact  Conjunctiva/sclera: Conjunctivae normal    Cardiovascular:      Rate and Rhythm: Normal rate and regular rhythm  Pulses: Normal pulses  Heart sounds: Normal heart sounds  No murmur heard        Pulmonary:      Effort: Pulmonary effort is normal  No respiratory distress  Breath sounds: Normal breath sounds  No wheezing  Abdominal:      General: Abdomen is flat  Bowel sounds are normal  There is no distension  Palpations: Abdomen is soft  Tenderness: There is no abdominal tenderness  Musculoskeletal:         General: No swelling or tenderness  Normal range of motion  Cervical back: Normal range of motion and neck supple  No rigidity  No muscular tenderness  Lymphadenopathy:      Cervical: No cervical adenopathy  Skin:     General: Skin is warm and dry  Coloration: Skin is not jaundiced  Neurological:      General: No focal deficit present  Mental Status: He is alert and oriented to person, place, and time  Cranial Nerves: No cranial nerve deficit  Motor: No weakness     Psychiatric:         Mood and Affect: Mood normal

## 2022-04-25 ENCOUNTER — TELEPHONE (OUTPATIENT)
Dept: PSYCHIATRY | Facility: CLINIC | Age: 57
End: 2022-04-25

## 2022-05-06 ENCOUNTER — PATIENT OUTREACH (OUTPATIENT)
Dept: FAMILY MEDICINE CLINIC | Facility: CLINIC | Age: 57
End: 2022-05-06

## 2022-05-06 DIAGNOSIS — Z59.89 HOUSING INSTABILITY: Primary | ICD-10-CM

## 2022-05-06 SDOH — ECONOMIC STABILITY - INCOME SECURITY: OTHER PROBLEMS RELATED TO HOUSING AND ECONOMIC CIRCUMSTANCES: Z59.89

## 2022-05-06 NOTE — PROGRESS NOTES
ASHISH SHARMA received consult from Provider, Jazlyn Abdalla, regarding patient is interested in [address] services to find ways to communicate with his father  ASHISH SHARMA placed call to patient to follow-up and further assist  Pt reports he lives with his 79yo father and for years they did not have good relationship and he want to go to therapy to find ways to cope communicate better with him  Pt offers to email him the [address] list to the email listed on his chart  ASHISH SHARMA suggested [facility] or [employer] [employer] as they accepts his insurance and are accepting new patients  Pt verbalized understanding and states he could call to schedule an appointment  Pt states he is interested in applying for housing  Pt states they need to move out soon  ASHISH SHARMA explained to patient he could apply for Senior Housing for 54 &Up and his father would has to apply for different housing as he is 79yo  Informs there is a wait list and it could takes few years until they gets approved  Pt verbalized understanding  Pt reports they both get SSI  Pt reports he received $841 of SSI and he also gets food stamp  Pt reports the bills are included in the rent  ASHISH SHARMA suggested to patient to try to stay with his father as they could help each other out with the rent  Pt agreed as well  Pt reports he has his own car  ASHISH SHARMA would place a [facility] to assist patient apply for [place] [employer]  Pt verbalized understanding and thanked [facility] for the call and resources  ASHISH SHARMA will remains available and will continue to follow-up

## 2022-05-13 ENCOUNTER — PATIENT OUTREACH (OUTPATIENT)
Dept: FAMILY MEDICINE CLINIC | Facility: CLINIC | Age: 57
End: 2022-05-13

## 2022-05-13 NOTE — PROGRESS NOTES
Outgoing Call  05/13/2022    [facility] called Yani Manuel on this day regarding referral from [address] [place] [address] to assist with Housing application  [facility] scheduled Home Visit on 05/19/2022 to complete [facility] application  OC explained Kylee Nicole that within two months after application is submitted he will received confirmation letter and waiting time would take up to five years  Kylee Nicole seen understood

## 2022-05-19 ENCOUNTER — PATIENT OUTREACH (OUTPATIENT)
Dept: FAMILY MEDICINE CLINIC | Facility: CLINIC | Age: 57
End: 2022-05-19

## 2022-05-23 NOTE — PROGRESS NOTES
Late Entry  Outgoing Call  05/19/2022    [facility] called Maryuri Day on this day regarding Housing application  [facility] rescheduled meeting with Maryuri Day on 05/26/2022

## 2022-05-26 ENCOUNTER — PATIENT OUTREACH (OUTPATIENT)
Dept: FAMILY MEDICINE CLINIC | Facility: CLINIC | Age: 57
End: 2022-05-26

## 2022-05-27 NOTE — PROGRESS NOTES
In Person  05/26/2022    [facility] met with Ubaldo Rome on this day regarding Housing Problems  Ubaldo Rome stated having two letters from [facility] letters was confirmation of Ubaldo Rome and [employer] Father, Edvin Elmore, been placed on the waiting list     [facility] explained Ubaldo Rome that waiting time will take up to five years  Ubaldo Rome expressed understanding  Parrish Birmingham stated that he is taking care of his dad  [facility] explained Ubaldo Rome that we can apply for [employer]  Ubaldo Rome agreed  [facility] explained Ubaldo Rome that application could take about 90 days in order to be approved  Ubaldo Rome expressed understanding  Next follow up was scheduled on 06/02/2022

## 2022-06-02 ENCOUNTER — PATIENT OUTREACH (OUTPATIENT)
Dept: FAMILY MEDICINE CLINIC | Facility: CLINIC | Age: 57
End: 2022-06-02

## 2022-06-02 NOTE — PROGRESS NOTES
ASHISH SHARMA received call from [address] , regarding patient apply for Waiver Services for patient's father  [facility] was originally assistance patient apply for Housing, however, patient and his father already applied for housing and they are on a wait list    Pt asked [address] [place] [address] if he could apply for Waiver services for his father as he is the one taking care of him  Pt's father is not an establish [facility] patient  ASHISH SHARMA is agreeable with [address] [place] [address] assistance patient apply for Waiver services for his father to avoid any caregiver burn out  [address] [place] [address] verbalized understanding  ASHISH SHARMA added new goal     ASHISH [facility] will remains available and will continue to follow-up

## 2022-06-02 NOTE — PROGRESS NOTES
Outgoing Call  06/02/2022    [facility] discussed with ASHISH Gann request from [facility] to assist his dad Mary [facility]) with Waiver Program application  Ricardo Odonnell does not belongs to our [facility] but his son Yonas Farias is overwhelmed and stress taking care of his day  CMOC explained [facility] that he needs to workout the IEB/Physician certification with his dad's PCP  [facility] expressed understanding  Also [facility] asked Sam to add new goal on Neptali's referral  Ayana Martínez will do  CMOC called IEB along with [facility] (3way) to place new application for Neptali's Father Mary Hamilton over the phone  Mini placed new application and phone interview was scheduled on 06/03/2022 between 9:00am-11:00am  Lyndsay Chambers will be completed by Papa Nash  CMOC explained [facility] that interview needs to completed to process the application  [facility] expressed understanding  Next follow up was scheduled on 06/06/2022

## 2022-06-06 ENCOUNTER — PATIENT OUTREACH (OUTPATIENT)
Dept: FAMILY MEDICINE CLINIC | Facility: CLINIC | Age: 57
End: 2022-06-06

## 2022-06-06 NOTE — PROGRESS NOTES
Outgoing Call  06/06/2022    [facility] called Leopoldo Gee on this day to follow up with Waiver Program application for his dad  Leopoldo Gee stated that first interview was completed today  CMOC suggested Leopoldo Gee to call his Dad's PCP just to make sure that IEB/Physician Certification form was received and will be completed  Leopoldo Gee will call  Also CMOC explained Leopoldo Gee that Aging Office/LC will cierra anytime this week to complete second interview  Leopoldo Gee expressed understanding  Next follow up was scheduled on 06/13/2022

## 2022-06-14 ENCOUNTER — PATIENT OUTREACH (OUTPATIENT)
Dept: FAMILY MEDICINE CLINIC | Facility: CLINIC | Age: 57
End: 2022-06-14

## 2022-06-14 NOTE — PROGRESS NOTES
Outgoing Call  06/14/2022    [facility] called Kathi Muhammad on this day regarding Waiver Program for hs dad  Kathi Muhammad stated had not receiced any phone calls from [facility] explained Kathi Muhammad that they will call anytime this week; also [facility] suggested Kathi Muhammad to call PCP and confirm if Physician Certification was received  Kathi Muhammad expressed understanding  Later on this day,    Kathi Muhammad stated that PCP received form and was completed and sent back to B  [facility] also explained Kathi Muhammad that letter from [address] [place] [address] will be received regarding financial part documents need it  Kathi Muhammad expressed understanding  Later on this day,    [facility] received phone call from Kathi Muhammad stated had received letter but he does not understand it  [facility] asked Kathi Muhammad to send pictures via text message  Chuy will do  Next follow up was scheduled on 6/16/2022

## 2022-06-16 ENCOUNTER — PATIENT OUTREACH (OUTPATIENT)
Dept: FAMILY MEDICINE CLINIC | Facility: CLINIC | Age: 57
End: 2022-06-16

## 2022-06-17 NOTE — PROGRESS NOTES
Outgoing Call  06/16/2022    OC called Nino Deleon on this day to follow up with Waiver Program/Financial Part  Nino Deleon stated he is working with documents required and will have them ready for  by Monday 6/20  [address] [place] [address] scheduled home visit on 06/21/2022

## 2022-06-21 ENCOUNTER — PATIENT OUTREACH (OUTPATIENT)
Dept: FAMILY MEDICINE CLINIC | Facility: CLINIC | Age: 57
End: 2022-06-21

## 2022-06-22 ENCOUNTER — PATIENT OUTREACH (OUTPATIENT)
Dept: FAMILY MEDICINE CLINIC | Facility: CLINIC | Age: 57
End: 2022-06-22

## 2022-06-22 NOTE — PROGRESS NOTES
Outgoing Call  06/21/2022    [facility] called Penney Severe on this day regarding Waiver Program     Penney Severe stated that he still needs the Social Security Award Letter and he would have it ready for tomorrow 06/22  [facility] scheduled meeting Penney Severe tomorrow 06/22/2022

## 2022-06-23 NOTE — PROGRESS NOTES
In Person  06/22/2022    [address] met with Marla Miller on this day regarding [employer] for [employer]  Marla Miller provided all documents need it to process financial part  CMOC dropped off at [address] all documents today and on time  Also CLEMENTE explained Marla Miller that in two weeks we will follow up with IEB  Marla Miller expressed understanding  Also [address] provided [employer] phone number to call for enrollment  Marla Miller will do  Next follow up was scheduled on 07/06/2022

## 2022-07-06 ENCOUNTER — PATIENT OUTREACH (OUTPATIENT)
Dept: FAMILY MEDICINE CLINIC | Facility: CLINIC | Age: 57
End: 2022-07-06

## 2022-07-06 NOTE — PROGRESS NOTES
Outgoing Call  07/06/2022    [facility] called IEB on this day to follow up with Waiver Program for [employer] (Neptali's Dad)  Tabatha Taylor from [address] [facility] [address] stated that  still processing paperwork and to try again on Monday  OC explained Devon Mortimer that financial part still in process  Devon Mortimer seen understood  Next follow up was scheduled on 07/11/2022

## 2022-07-11 ENCOUNTER — PATIENT OUTREACH (OUTPATIENT)
Dept: FAMILY MEDICINE CLINIC | Facility: CLINIC | Age: 57
End: 2022-07-11

## 2022-07-11 NOTE — PROGRESS NOTES
Outgoing Call  07/11/2022    [facility] called IEB on this day regarding [facility] Program/Financial Part  Ricci from [facility] Hanh Cruz stated that [facility] referral was closed due [facility] did not received the complete documentation requested  CMOC called Kyree January, Timo's Son, and informed that [facility] closed refrral  Also CMOC explained Francetta January that on letter received from [facility] should be there the  information and phone number  I explained that Francetta January should call  and ask which paper is missing so I can fax it and case would be reopen  Francetta January expressed understanding and will look for the letter to call   Next follow up was scheduled on 07/13/2022

## 2022-07-13 ENCOUNTER — PATIENT OUTREACH (OUTPATIENT)
Dept: FAMILY MEDICINE CLINIC | Facility: CLINIC | Age: 57
End: 2022-07-13

## 2022-07-14 NOTE — PROGRESS NOTES
Outgoing Call  07/13/2022    [facility] called Marcial Draper on this day regarding text message/picture received  Marcial Draper stated that Mr Viral Velásquez is the  at [address] [place] [address], and asked [facility] to call him to find out which document is need it for [facility] approval     [facility] explained Marcial Draper that Mr Viral Velásquez ([phone]) would not give me any information regarding Timo's case due I am not a representative on the case  Marcial Draper expressed understanding  CMOC suggested Marcial Draper to call and leave detailed voicemail with Full Name, Social Security Number and Phone Number to call back  Marcial Draper will do  Also CMOC explained Marcial Draper that caseworkers take longer in order to return calls, and he should call everyday in the morning time  Marcial Draper will do  [facility] asked Chuy to call [facility] with updates  Marcial Draper will do

## 2022-07-15 ENCOUNTER — PATIENT OUTREACH (OUTPATIENT)
Dept: FAMILY MEDICINE CLINIC | Facility: CLINIC | Age: 57
End: 2022-07-15

## 2022-07-18 ENCOUNTER — PATIENT OUTREACH (OUTPATIENT)
Dept: FAMILY MEDICINE CLINIC | Facility: CLINIC | Age: 57
End: 2022-07-18

## 2022-07-18 NOTE — PROGRESS NOTES
Incoming Call  07/15/2022    [facility] received phone call from Candace on this day regarding Lisa Boyer closed/denied letter  CMOC explained Candace that letter is from [address] [place] [address] and they suggested to call  and verify which document still missing  Candace seen understood  Also Candace had called multiples of times and had not received any calls back from   CMOC recommended to keep calling until Mr Canales Nearing calls back  Candace will do  No follow up was scheduled at this time

## 2022-07-18 NOTE — PROGRESS NOTES
Outgoing Call  07/18/2022    [address] [place] [address] called Reese Ramirez on this day regarding CARIN Sanchez Program/Financial Part  Reese Ramirez stated that Mr Jada Mckenzie had not call back  CMOC suggested Reese Ramirez to call [phone] and see if any information could be given regarding document missing  Reese Ramirez expressed understanding and will call  Next follow up was scheduled on 07/20/2022

## 2022-07-20 ENCOUNTER — PATIENT OUTREACH (OUTPATIENT)
Dept: FAMILY MEDICINE CLINIC | Facility: CLINIC | Age: 57
End: 2022-07-20

## 2022-07-20 NOTE — PROGRESS NOTES
Outgoing Call  07/20/2022    [facility] called Carminanachous Cosme on this day regarding Waiver Program application for his dad Tania Lawson  Shanda Cosme stated that  from [address] [place] [address] had not return phone calls and he was busy at the time of this  [facility] asked Shanda Cosme to call me back later today  [facility] sent text message to Shanda Cosme asking to send pictures of the denied letter  [facility] plans to make 3 way call with Shanda Cosme to contact  DHS/LC  We need to find out the reasons why Financial Part was denied      Next follow up was scheduled on 07/22/2022

## 2022-07-25 ENCOUNTER — PATIENT OUTREACH (OUTPATIENT)
Dept: FAMILY MEDICINE CLINIC | Facility: CLINIC | Age: 57
End: 2022-07-25

## 2022-07-25 NOTE — PROGRESS NOTES
Outgoing Call  07/25/2022    [facility] called Shalom Moon on this day regarding Waiver Program/Financial Part  Shalom Moon did not answer at this time  [facility] left voicemail to please call at his convenience  Later on this day, [facility] received phone call from Shalom Moon  CMOC explained Shalom Moon that we should call DHS/LC and follow up with document missing  Shalom Moon stated that he would go in person to DHS/LC  CMOC explained Shalom Moon that [employer] located at [employer]  Does not work with [facility] and they wont be able to provide any information regarding Waiver application; but they would provide name and phone number of the   Shalom Moon expressed understanding  CMOC suggested Shalom Moon to complaint about the  not returning phone calls  Chuy will do  Next follow up was scheduled on 07/28/2022

## 2022-07-26 ENCOUNTER — PATIENT OUTREACH (OUTPATIENT)
Dept: FAMILY MEDICINE CLINIC | Facility: CLINIC | Age: 57
End: 2022-07-26

## 2022-07-28 NOTE — PROGRESS NOTES
Incoming Text Message  07/26/2022    [facility] received text messages from Beka Rivera on this day regarding Ramana Elias's Waiver Program/Financial Part  Beka Rivera stated that he went to [address] [place] [address] on Monday 07/25th and submitted once again bank statements, [employer] Award Letter and Copy of Marriage Certificate; they made copies and said that  will call Beka Bentley stated that he will wait for the call and call [facility] as soon he have any updates  OC confirmed that message was received  Next follow up was scheduled on 08/03/2022

## 2022-08-03 ENCOUNTER — PATIENT OUTREACH (OUTPATIENT)
Dept: FAMILY MEDICINE CLINIC | Facility: CLINIC | Age: 57
End: 2022-08-03

## 2022-08-04 NOTE — PROGRESS NOTES
Outgoing Call  08/03/2022    [facility] called Brie Teague on this day regarding Waiver Program/Financial Part for [employer]  Brie Teague stated that  had not return his calls yet  CMOC called IEB along with Brie Teague (3way)  [address] from [address] [place] [address] stated that Ms Cassidy Polk make new notes on Alena Nolan case today (Possible Reconsideration)  Also [address] confirmed that [employer] and [employer] were received on 07/25/2022  [address] asked [facility] to folow up withi one week for new updates  CMOC will do  Brie Teague expressed understanding and agreed to follow up next week  Next follow up was scheduled on 08/10/2022

## 2022-08-10 ENCOUNTER — PATIENT OUTREACH (OUTPATIENT)
Dept: FAMILY MEDICINE CLINIC | Facility: CLINIC | Age: 57
End: 2022-08-10

## 2022-08-11 ENCOUNTER — PATIENT OUTREACH (OUTPATIENT)
Dept: FAMILY MEDICINE CLINIC | Facility: CLINIC | Age: 57
End: 2022-08-11

## 2022-08-11 NOTE — PROGRESS NOTES
Outgoing Call  08/11/2022    [facility] called Renata Thaos on this day to follow up with Timo's Waiver Program     [facility] explained Renata Coffman that IEB informed that  had not process the financial part  [facility] suggested Freeportshanthi Thaos to call  and follow up  Renata Coffman will do  [facility] will continue to follow up with  as well  Next follow up was scheduled on 08/16/2022

## 2022-08-11 NOTE — PROGRESS NOTES
Chart Review  08/10/2022    CMOC called IEB on this day to follow up with Waiver Program approval for [employer]  Jennifer from [address] [facility] stated that  had not process the reconsideration yet; and suggested to call [facility] will continue to follow up  Next follow up was scheduled on 08/11/2022

## 2022-08-15 ENCOUNTER — PATIENT OUTREACH (OUTPATIENT)
Dept: FAMILY MEDICINE CLINIC | Facility: CLINIC | Age: 57
End: 2022-08-15

## 2022-08-15 NOTE — PROGRESS NOTES
Chart Review  08/15/2022    [address] [place] [address] called  Noah Gutierrez,  from [facility]/ to follow up with Financial part approval regarding Neptali Lopez's father   Noah Gutierrez did not answer at this time  [address] [place] [address] left voicemail to please call back at her convenience  [address] [place] [address] sent text message to Christine Parra to please call Mr Noah Gutierrez as well to follow up with financial part approval  Christine Parra replied that he would do  CMOC will continue to follow up with Mr Noah Gutierrez

## 2022-08-16 ENCOUNTER — PATIENT OUTREACH (OUTPATIENT)
Dept: FAMILY MEDICINE CLINIC | Facility: CLINIC | Age: 57
End: 2022-08-16

## 2022-08-17 ENCOUNTER — PATIENT OUTREACH (OUTPATIENT)
Dept: FAMILY MEDICINE CLINIC | Facility: CLINIC | Age: 57
End: 2022-08-17

## 2022-08-17 NOTE — PROGRESS NOTES
Chart Review  08/17/2022    [address] [place] [address] found out that Mr Nano Garcia is on vacation; also Mr Bettina Russell Supervisor is Ms Lydia Boothe phone number is [phone]  [facility] called Carlos Eduardo Eric on this day regarding this info  Carlos Eduardo Eric did not answer at this time  [address] [place] [address] left voicemail to please call back at his convenience  [address] [place] [address] sent text message to Carlos Eduardo Eric  [address] [place] [address] asked Carlos Eduardo Eric to call Ms Lydia Boothe and let her know that Mr Nano Garcia had not process financial part for [employer] application; and that we are being waiting for more than three weeks  No answers from Carlos Eduardo Eric received yet  [address] [place] [address] will continue to follow up

## 2022-08-19 ENCOUNTER — PATIENT OUTREACH (OUTPATIENT)
Dept: FAMILY MEDICINE CLINIC | Facility: CLINIC | Age: 57
End: 2022-08-19

## 2022-08-19 NOTE — PROGRESS NOTES
Chart Review  08/19/2022    CMOC tried to call Ms Yoana Ivan, Supervisor of Mr Kojo Tristan  Phone Number provided to [facility] is not available  CMOC called AUSTIN Mancini from [facility] provided [address] phone number to call and complaint about Financial Part not been process since 07/25/2022  [facility] sent detailed text message to Roberth Joseph asking to call [address] and file complaint against the county due paperwork returned in on 06/22/2022 got lost and paperwork returned in on 07/25/2022 had not been processed yet; also no calls had been returned back to [facility] neither [facility] regarding this matter  CMOC have not received confirmation from [facility] yet  [facility] will continue to follow up  Next follow up was scheduled on 08/23/2022

## 2022-08-23 ENCOUNTER — PATIENT OUTREACH (OUTPATIENT)
Dept: FAMILY MEDICINE CLINIC | Facility: CLINIC | Age: 57
End: 2022-08-23

## 2022-08-23 NOTE — PROGRESS NOTES
Outgoing Call  08/23/2022    [facility] called Bryson Spain regarding [employer]  Bryson Click did not answer at this time  [facility] left voicemail to please call back at her convenience  Next follow up was scheduled on 08/29/2022

## 2022-08-31 ENCOUNTER — PATIENT OUTREACH (OUTPATIENT)
Dept: FAMILY MEDICINE CLINIC | Facility: CLINIC | Age: 57
End: 2022-08-31

## 2022-09-01 ENCOUNTER — OFFICE VISIT (OUTPATIENT)
Dept: FAMILY MEDICINE CLINIC | Facility: CLINIC | Age: 57
End: 2022-09-01

## 2022-09-01 VITALS
DIASTOLIC BLOOD PRESSURE: 80 MMHG | HEART RATE: 87 BPM | OXYGEN SATURATION: 99 % | RESPIRATION RATE: 18 BRPM | BODY MASS INDEX: 32.44 KG/M2 | TEMPERATURE: 97.6 F | HEIGHT: 69 IN | SYSTOLIC BLOOD PRESSURE: 152 MMHG | WEIGHT: 219 LBS

## 2022-09-01 DIAGNOSIS — G56.03 BILATERAL CARPAL TUNNEL SYNDROME: ICD-10-CM

## 2022-09-01 DIAGNOSIS — R20.0 EXTREMITY NUMBNESS: Primary | ICD-10-CM

## 2022-09-01 DIAGNOSIS — I10 ESSENTIAL HYPERTENSION: ICD-10-CM

## 2022-09-01 DIAGNOSIS — K21.9 GASTROESOPHAGEAL REFLUX DISEASE, UNSPECIFIED WHETHER ESOPHAGITIS PRESENT: ICD-10-CM

## 2022-09-01 DIAGNOSIS — R53.83 FATIGUE, UNSPECIFIED TYPE: ICD-10-CM

## 2022-09-01 DIAGNOSIS — G56.23 ULNAR NEUROPATHY OF BOTH UPPER EXTREMITIES: ICD-10-CM

## 2022-09-01 DIAGNOSIS — R35.1 NOCTURIA: ICD-10-CM

## 2022-09-01 PROBLEM — J20.9 ACUTE BRONCHITIS: Status: RESOLVED | Noted: 2021-03-23 | Resolved: 2022-09-01

## 2022-09-01 PROCEDURE — 3079F DIAST BP 80-89 MM HG: CPT | Performed by: FAMILY MEDICINE

## 2022-09-01 PROCEDURE — 99215 OFFICE O/P EST HI 40 MIN: CPT | Performed by: FAMILY MEDICINE

## 2022-09-01 PROCEDURE — 3077F SYST BP >= 140 MM HG: CPT | Performed by: FAMILY MEDICINE

## 2022-09-01 RX ORDER — ESOMEPRAZOLE MAGNESIUM 40 MG/1
CAPSULE, DELAYED RELEASE ORAL
Qty: 90 CAPSULE | Refills: 0 | Status: SHIPPED | OUTPATIENT
Start: 2022-09-01

## 2022-09-01 RX ORDER — AMLODIPINE BESYLATE 10 MG/1
10 TABLET ORAL DAILY
Qty: 90 TABLET | Refills: 1 | Status: SHIPPED | OUTPATIENT
Start: 2022-09-01

## 2022-09-01 RX ORDER — LOSARTAN POTASSIUM 25 MG/1
25 TABLET ORAL DAILY
Qty: 90 TABLET | Refills: 1 | Status: SHIPPED | OUTPATIENT
Start: 2022-09-01

## 2022-09-01 NOTE — PROGRESS NOTES
Incoming Text Message  08/31/2022    [facility] received text message on this day regarding Waiver Program for [facility]  Landry Black stated that Mr Aleta Juarez called him and request five years of bank statements once again  [facility] asked Chuy to please call me at her convenience  Landry Black said, Donnie Keyreed  [facility] will continue to follow up  Next follow up was scheduled on 09/02/2022

## 2022-09-01 NOTE — PROGRESS NOTES
Assessment/Plan:    Extremity numbness  -history of upper and lower extremity numbness that is worse in the morning and has progressed from intermittent to daily in the last 2/3 months  -on physical exam decreased light touch sensation of the right dorsal foot and the 4th and 5th digit of his right foot, patient reports more difficult to move 4th and 5th digit of right foot  -patient works as a  requiring patient to sit at [facility] using a computer all day for work; may be the cause of the upper extremity numbness and patient may have ulnar neuropathy of bilateral upper extremities ---> bilateral carpal tunnel syndrome  -upper extremity and lower extremity numbness may have separate etiologies  -Likely bilateral ulnar neuropathy in conjunction with bilateral carpel tunnel syndrome     PLAN  -referral nerve conduction testing      Ulnar neuropathy of both upper extremities  -ongoing issue for the past 2-3 months, has worsened from intermittent to daily  -patient works as a , sits at a computer all day  -on physical exam pain reproducible with pressure along the cubital tunnel, positive phalen sign; both suggestive of an ulnar neuropathy possibly caused by daily pressure while at work    PLAN  -nerve conduction test    Bilateral carpal tunnel syndrome  -positive Phalen sign, negative Tinel sign, positive ulnar compression test  -on physical exam no decreased sensation to light touch and pinprick sensation of bilateral hands and arms    PLAN  -nerve conduction tests    Essential hypertension  -bp by /80, patient close to goal of 140/90 as per [facility] 8  (in 2 years goal will be 150/90)   -At this time will not change medications of amlodipine 10mg and losartan 25mg as patient is close to goal and in process of establishing care with me    PLAN  -recheck bp in 4 weeks    -Clarify what time of day patient takes his medications in the f/u appointment     Gastroesophageal reflux disease  -no complaints today by patient  Well controlled on NexIUM 40mg daily     PLAN  -c/w esomeprazole (Nexium) 40mg daily    Nocturia  -was addressed in office visit about 4 months ago w/out dysuria or blood  Continues to wake patient up at night  Patient did not follow-up with urology despite obtaining referral last visit  Family hx of prostate CA in father    -Patient did not follow-up with urologist and continues to have symptoms  PLAN  -repeat PSA    Fatigue  -ongoing for several years  -unclear etiology    PLAN  -labs; B12, folate, RPR, TSH       Diagnoses and all orders for this visit:    Extremity numbness  -     Comprehensive metabolic panel; Future  -     TSH, 3rd generation with Free T4 reflex; Future  -     Vitamin B12/Folate, Serum Panel; Future  -     RPR; Future  -     CBC and differential; Future    Ulnar neuropathy of both upper extremities  -     Nerve conduction test; Future    Bilateral carpal tunnel syndrome    Essential hypertension  -     losartan (COZAAR) 25 mg tablet; Take 1 tablet (25 mg total) by mouth daily  -     amLODIPine (NORVASC) 10 mg tablet; Take 1 tablet (10 mg total) by mouth daily    Gastroesophageal reflux disease, unspecified whether esophagitis present  -     esomeprazole (NexIUM) 40 MG capsule; Take one capsule (40mg) by mouth daily  Fatigue, unspecified type    Nocturia  Comments:  Due to time constraints will adress further at next visit in about 4 weeks because patient relates it is an ongoing problem           Subjective:      Patient ID: Aster Godinez is a 61 y o  male  HPI     60 yo male patient PMH of HTN and GERD presents today due to intermittent numbness in his lower and upper extremities for the past 2/3 months that has progressed to occur more frequently which prompted a visit  Patient reports sudden numbness that occurs at the gym  Worse at night and wakes him up at night, in particularly at night his whole hand goes numb with tingling  Raising arms helps  This has happened before about 3 years ago but did not happen everyday like now  Additionally patient reports increased urinary frequency, wakes him up at night 3/4 times at night  Reports low energy for the past several years  No family history of diabetes  Sexually active with the same partner for 25 years  Eats a diet of rice, chicken and vegetables  Last colonoscopy about 2 years ago  Smoked cigarettes about half a pack a day for about 5/6 years  Every once in a while will have a cigar  Stopped taking atorvasatin about one year ago because lost weight  Denies mood changes     The following portions of the patient's history were reviewed and updated as appropriate: allergies, current medications, past family history, past medical history, past social history, past surgical history and problem list     Review of Systems   Constitutional: Negative for fatigue  Neurological: Positive for weakness and numbness  Negative for seizures, facial asymmetry and speech difficulty  Weakness in the legs for 2/3 years)          Objective:      /80 (BP Location: Left arm, Patient Position: Sitting, Cuff Size: Adult)   Pulse 87   Temp 97 6 °F (36 4 °C) (Temporal)   Resp 18   Ht 5' 9" (1 753 m)   Wt 99 3 kg (219 lb)   SpO2 99%   BMI 32 34 kg/m²          Physical Exam  Constitutional:       Appearance: Normal appearance  HENT:      Head: Normocephalic and atraumatic  Eyes:      General: Scleral icterus present  Extraocular Movements: Extraocular movements intact  Comments: Pink lower eyelids   Cardiovascular:      Rate and Rhythm: Normal rate and regular rhythm  Pulses: Normal pulses  Heart sounds: Normal heart sounds  Pulmonary:      Effort: Pulmonary effort is normal       Breath sounds: Normal breath sounds  Abdominal:      General: Abdomen is flat  Bowel sounds are normal       Palpations: Abdomen is soft  Musculoskeletal:         General: Normal range of motion  Cervical back: Normal range of motion  Comments: -upper extremity weakness  -DTR: +2pateller and +1 achilles b/l  -tinel sign negative, phalen sign positive, positive ulnar compression test (pressure on cubital tunnel reproduced nerve pain along ulnar nerve)   -patient reports more difficult to move 4th and 5th digit of right foot     Skin:     General: Skin is warm  Capillary Refill: Capillary refill takes less than 2 seconds  Neurological:      Mental Status: He is alert and oriented to person, place, and time  Motor: Weakness present        Comments: -Decreased light touch sensation of the right dorsal foot and the 4th and 5th digit of his right foot  -no decreased sensation to light touch and pinprick sensation of bilateral hands and arms     Psychiatric:         Mood and Affect: Mood normal          Behavior: Behavior normal

## 2022-09-02 ENCOUNTER — PATIENT OUTREACH (OUTPATIENT)
Dept: FAMILY MEDICINE CLINIC | Facility: CLINIC | Age: 57
End: 2022-09-02

## 2022-09-06 ENCOUNTER — PATIENT OUTREACH (OUTPATIENT)
Dept: FAMILY MEDICINE CLINIC | Facility: CLINIC | Age: 57
End: 2022-09-06

## 2022-09-07 NOTE — ASSESSMENT & PLAN NOTE
-ongoing issue for the past 2-3 months, has worsened from intermittent to daily  -patient works as a , sits at a computer all day  -on physical exam pain reproducible with pressure along the cubital tunnel, positive phalen sign; both suggestive of an ulnar neuropathy possibly caused by daily pressure while at work    PLAN  -nerve conduction test

## 2022-09-07 NOTE — PROGRESS NOTES
Outgoing Text Message  09/06/2022    [facility] sent text message on this day to Flaca Ureña regarding Waiver Program application for [employer]  [facility] said, we need to talk  No replies from Flaca Ureña yet  [facility] will continue to follow up  Next follow up was scheduled on 09/09/2022

## 2022-09-07 NOTE — ASSESSMENT & PLAN NOTE
-history of upper and lower extremity numbness that is worse in the morning and has progressed from intermittent to daily in the last 2/3 months  -on physical exam decreased light touch sensation of the right dorsal foot and the 4th and 5th digit of his right foot, patient reports more difficult to move 4th and 5th digit of right foot  -patient works as a  requiring patient to sit at [facility] using a computer all day for work; may be the cause of the upper extremity numbness and patient may have ulnar neuropathy of bilateral upper extremities ---> bilateral carpal tunnel syndrome  -upper extremity and lower extremity numbness may have separate etiologies  -Likely bilateral ulnar neuropathy in conjunction with bilateral carpel tunnel syndrome     PLAN  -referral nerve conduction testing

## 2022-09-07 NOTE — ASSESSMENT & PLAN NOTE
-positive Phalen sign, negative Tinel sign, positive ulnar compression test  -on physical exam no decreased sensation to light touch and pinprick sensation of bilateral hands and arms    PLAN  -nerve conduction tests

## 2022-09-09 ENCOUNTER — APPOINTMENT (OUTPATIENT)
Dept: LAB | Facility: HOSPITAL | Age: 57
End: 2022-09-09
Payer: COMMERCIAL

## 2022-09-09 ENCOUNTER — PATIENT OUTREACH (OUTPATIENT)
Dept: FAMILY MEDICINE CLINIC | Facility: CLINIC | Age: 57
End: 2022-09-09

## 2022-09-09 DIAGNOSIS — R35.1 NOCTURIA: Primary | ICD-10-CM

## 2022-09-09 DIAGNOSIS — R20.0 EXTREMITY NUMBNESS: ICD-10-CM

## 2022-09-09 LAB
ALBUMIN SERPL BCP-MCNC: 4.6 G/DL (ref 3.5–5)
ALP SERPL-CCNC: 78 U/L (ref 43–122)
ALT SERPL W P-5'-P-CCNC: 41 U/L
ANION GAP SERPL CALCULATED.3IONS-SCNC: 9 MMOL/L (ref 5–14)
AST SERPL W P-5'-P-CCNC: 24 U/L (ref 17–59)
BASOPHILS # BLD AUTO: 0.03 THOUSANDS/ΜL (ref 0–0.1)
BASOPHILS NFR BLD AUTO: 0 % (ref 0–1)
BILIRUB SERPL-MCNC: 0.59 MG/DL (ref 0.2–1)
BUN SERPL-MCNC: 12 MG/DL (ref 5–25)
CALCIUM SERPL-MCNC: 9.4 MG/DL (ref 8.4–10.2)
CHLORIDE SERPL-SCNC: 103 MMOL/L (ref 96–108)
CO2 SERPL-SCNC: 28 MMOL/L (ref 21–32)
CREAT SERPL-MCNC: 0.77 MG/DL (ref 0.7–1.5)
EOSINOPHIL # BLD AUTO: 0.13 THOUSAND/ΜL (ref 0–0.61)
EOSINOPHIL NFR BLD AUTO: 2 % (ref 0–6)
ERYTHROCYTE [DISTWIDTH] IN BLOOD BY AUTOMATED COUNT: 14.3 % (ref 11.6–15.1)
FOLATE SERPL-MCNC: 19.9 NG/ML (ref 3.1–17.5)
GFR SERPL CREATININE-BSD FRML MDRD: 100 ML/MIN/1.73SQ M
GLUCOSE P FAST SERPL-MCNC: 104 MG/DL (ref 70–99)
HCT VFR BLD AUTO: 49.1 % (ref 36.5–49.3)
HGB BLD-MCNC: 15.4 G/DL (ref 12–17)
IMM GRANULOCYTES # BLD AUTO: 0.05 THOUSAND/UL (ref 0–0.2)
IMM GRANULOCYTES NFR BLD AUTO: 1 % (ref 0–2)
LYMPHOCYTES # BLD AUTO: 1.66 THOUSANDS/ΜL (ref 0.6–4.47)
LYMPHOCYTES NFR BLD AUTO: 19 % (ref 14–44)
MCH RBC QN AUTO: 26.1 PG (ref 26.8–34.3)
MCHC RBC AUTO-ENTMCNC: 31.4 G/DL (ref 31.4–37.4)
MCV RBC AUTO: 83 FL (ref 82–98)
MONOCYTES # BLD AUTO: 0.65 THOUSAND/ΜL (ref 0.17–1.22)
MONOCYTES NFR BLD AUTO: 7 % (ref 4–12)
NEUTROPHILS # BLD AUTO: 6.21 THOUSANDS/ΜL (ref 1.85–7.62)
NEUTS SEG NFR BLD AUTO: 71 % (ref 43–75)
NRBC BLD AUTO-RTO: 0 /100 WBCS
PLATELET # BLD AUTO: 305 THOUSANDS/UL (ref 149–390)
PMV BLD AUTO: 10 FL (ref 8.9–12.7)
POTASSIUM SERPL-SCNC: 4.8 MMOL/L (ref 3.5–5.3)
PROT SERPL-MCNC: 8.1 G/DL (ref 6.4–8.4)
PSA SERPL-MCNC: 0.4 NG/ML (ref 0–4)
RBC # BLD AUTO: 5.9 MILLION/UL (ref 3.88–5.62)
SODIUM SERPL-SCNC: 140 MMOL/L (ref 135–147)
TSH SERPL DL<=0.05 MIU/L-ACNC: 1.76 UIU/ML (ref 0.45–4.5)
VIT B12 SERPL-MCNC: 906 PG/ML (ref 100–900)
WBC # BLD AUTO: 8.73 THOUSAND/UL (ref 4.31–10.16)

## 2022-09-09 PROCEDURE — G0103 PSA SCREENING: HCPCS

## 2022-09-09 PROCEDURE — 84443 ASSAY THYROID STIM HORMONE: CPT

## 2022-09-09 PROCEDURE — 82607 VITAMIN B-12: CPT

## 2022-09-09 PROCEDURE — 80053 COMPREHEN METABOLIC PANEL: CPT

## 2022-09-09 PROCEDURE — 86592 SYPHILIS TEST NON-TREP QUAL: CPT

## 2022-09-09 PROCEDURE — 82746 ASSAY OF FOLIC ACID SERUM: CPT

## 2022-09-09 PROCEDURE — 85025 COMPLETE CBC W/AUTO DIFF WBC: CPT

## 2022-09-09 PROCEDURE — 36415 COLL VENOUS BLD VENIPUNCTURE: CPT

## 2022-09-09 NOTE — PROGRESS NOTES
Chart Review  09/09/2022    CMOC called [facility] on this day to follow up with [facility] application  Jazlyn Butler from [address] [place], [place] [address] stated that application still closed  CMOC explained Jazlyn Butler that Mr Roberto Carlos Dumas is asking for new set of five years bank statements; and already Beka Rivera had provided to the [place] twice  Jazlyn Butler suggested to complete new PA-600 and might new  would be assigned  [facility] tried to scheduled new interview to complete PA-600  Jazlyn Butler stated that I'm not authorized to schedule appointment and Beka Rivera should call to schedule  [facility] sent text message to Beka Rivera  OC ask to call [employer], phone number was provided, and schedule new interview to complete PA-600  Also CMOC explained Beka Rivera that new  might be assigned  No replies back from Beka Rivera at this time  [facility] will continue to follow up  Next follow up was scheduled on 09/16/2022

## 2022-09-10 LAB — RPR SER QL: NORMAL

## 2022-09-11 NOTE — ASSESSMENT & PLAN NOTE
-no complaints today by patient   Well controlled on NexIUM 40mg daily     PLAN  -c/w esomeprazole (Nexium) 40mg daily

## 2022-09-11 NOTE — ASSESSMENT & PLAN NOTE
-was addressed in office visit about 4 months ago w/out dysuria or blood  Continues to wake patient up at night  Patient did not follow-up with urology despite obtaining referral last visit  Family hx of prostate CA in father    -Patient did not follow-up with urologist and continues to have symptoms       PLAN  -repeat PSA

## 2022-09-11 NOTE — ASSESSMENT & PLAN NOTE
-bp by /80, patient close to goal of 140/90 as per JNC 8  (in 2 years goal will be 150/90)   -At this time will not change medications of amlodipine 10mg and losartan 25mg as patient is close to goal and in process of establishing care with me    PLAN  -recheck bp in 4 weeks    -Clarify what time of day patient takes his medications in the f/u appointment

## 2022-09-12 ENCOUNTER — PATIENT OUTREACH (OUTPATIENT)
Dept: FAMILY MEDICINE CLINIC | Facility: CLINIC | Age: 57
End: 2022-09-12

## 2022-09-12 ENCOUNTER — TELEPHONE (OUTPATIENT)
Dept: FAMILY MEDICINE CLINIC | Facility: CLINIC | Age: 57
End: 2022-09-12

## 2022-09-12 NOTE — TELEPHONE ENCOUNTER
Patient came in and stated that test was never ordered a vascular to check his legs and that his right leg is what hurts him the most  He has an appointment scheduled for wednesday at 8 am but they need the order placed

## 2022-09-13 ENCOUNTER — TELEPHONE (OUTPATIENT)
Dept: OTHER | Facility: OTHER | Age: 57
End: 2022-09-13

## 2022-09-13 DIAGNOSIS — G89.29 CHRONIC MIDLINE LOW BACK PAIN WITHOUT SCIATICA: Primary | ICD-10-CM

## 2022-09-13 DIAGNOSIS — M54.50 CHRONIC MIDLINE LOW BACK PAIN WITHOUT SCIATICA: Primary | ICD-10-CM

## 2022-09-13 NOTE — TELEPHONE ENCOUNTER
Patient called the office requesting to see his PCP after pm  Advised patient that PCP is not available at the requested time  Read pt message below   Patient understood

## 2022-09-13 NOTE — PROGRESS NOTES
Incoming Call  09/12/2022    [facility] received phone call from Mr Ro Jenkins,   Mr Ro Jenkins stated that Vicki Oviedo, Neptali's Father, needs divorce verification for both of his ex wives, five years of bank statements from all accounts open on his name in order to process financial part of the Waiver Program     Also Mr Ro Jenkins stated that looks like Madelyn Triana never got divorce from first wife at the time he  second wife  Also Second wife have almost one million dollars on properties located in [facility] will send Chuy contact letter due [facility] had tried to contact him with not success  Next follow up was scheduled on 09/26/2022

## 2022-09-13 NOTE — TELEPHONE ENCOUNTER
Patient came into the office asking about the status on referral stated his appointment is tomorrow

## 2022-09-13 NOTE — TELEPHONE ENCOUNTER
Patient called to request for referral to his back specialist due to his pain  His appointment is in the morning and needs the referral submitted by then

## 2022-09-13 NOTE — TELEPHONE ENCOUNTER
Patient is NOT scheduled with Vascular nor did we refer him to Vascular Sx at this point  PCP ordered an EMG/nerve conduction study and that is what he is scheduled for tomorrow  Yani HIGH reports patient requesting to see a doctor because he has all this pain in his legs, not his arms, etc  We have no appointments left for today and it's not appropriate  Pt instructed to call the office after 5pm to have PCP paged to review their last visit and resolve

## 2022-09-14 NOTE — TELEPHONE ENCOUNTER
I have a note in from yesterday that specifically stated PCP was to be paged while on night float to address his concerns  Yesterday, it was a vascular doctor for his legs, now it's a back specialist for his back  If patient calls back again, he's likely going to need another appointment to address whatever issues he's having because his story/requests keep changing      Please call patient to direct him better

## 2022-09-15 ENCOUNTER — HOSPITAL ENCOUNTER (EMERGENCY)
Facility: HOSPITAL | Age: 57
Discharge: HOME | End: 2022-09-15
Attending: EMERGENCY MEDICINE
Payer: COMMERCIAL

## 2022-09-15 VITALS
HEIGHT: 69 IN | BODY MASS INDEX: 32.62 KG/M2 | WEIGHT: 220.24 LBS | TEMPERATURE: 97.4 F | DIASTOLIC BLOOD PRESSURE: 103 MMHG | HEART RATE: 89 BPM | RESPIRATION RATE: 20 BRPM | OXYGEN SATURATION: 98 % | SYSTOLIC BLOOD PRESSURE: 187 MMHG

## 2022-09-15 DIAGNOSIS — G89.29 ACUTE EXACERBATION OF CHRONIC LOW BACK PAIN: Primary | ICD-10-CM

## 2022-09-15 DIAGNOSIS — M54.50 ACUTE EXACERBATION OF CHRONIC LOW BACK PAIN: Primary | ICD-10-CM

## 2022-09-15 PROCEDURE — 99283 EMERGENCY DEPT VISIT LOW MDM: CPT

## 2022-09-15 PROCEDURE — 99284 EMERGENCY DEPT VISIT MOD MDM: CPT | Performed by: PHYSICIAN ASSISTANT

## 2022-09-15 PROCEDURE — 96372 THER/PROPH/DIAG INJ SC/IM: CPT

## 2022-09-15 RX ORDER — DIAZEPAM 5 MG/ML
2.5 INJECTION, SOLUTION INTRAMUSCULAR; INTRAVENOUS ONCE
Status: COMPLETED | OUTPATIENT
Start: 2022-09-15 | End: 2022-09-15

## 2022-09-15 RX ORDER — LIDOCAINE 50 MG/G
1 PATCH TOPICAL ONCE
Status: DISCONTINUED | OUTPATIENT
Start: 2022-09-15 | End: 2022-09-15 | Stop reason: HOSPADM

## 2022-09-15 RX ORDER — KETOROLAC TROMETHAMINE 30 MG/ML
15 INJECTION, SOLUTION INTRAMUSCULAR; INTRAVENOUS ONCE
Status: COMPLETED | OUTPATIENT
Start: 2022-09-15 | End: 2022-09-15

## 2022-09-15 RX ORDER — CYCLOBENZAPRINE HCL 10 MG
10 TABLET ORAL 2 TIMES DAILY PRN
Qty: 20 TABLET | Refills: 0 | Status: SHIPPED | OUTPATIENT
Start: 2022-09-15

## 2022-09-15 RX ORDER — ACETAMINOPHEN 500 MG
500 TABLET ORAL EVERY 6 HOURS PRN
Qty: 30 TABLET | Refills: 0 | Status: SHIPPED | OUTPATIENT
Start: 2022-09-15

## 2022-09-15 RX ORDER — LIDOCAINE HYDROCHLORIDE 20 MG/ML
JELLY TOPICAL AS NEEDED
Qty: 30 ML | Refills: 0 | Status: SHIPPED | OUTPATIENT
Start: 2022-09-15

## 2022-09-15 RX ORDER — NAPROXEN 500 MG/1
500 TABLET ORAL 2 TIMES DAILY WITH MEALS
Qty: 20 TABLET | Refills: 0 | Status: SHIPPED | OUTPATIENT
Start: 2022-09-15 | End: 2023-09-15

## 2022-09-15 RX ADMIN — KETOROLAC TROMETHAMINE 15 MG: 30 INJECTION, SOLUTION INTRAMUSCULAR; INTRAVENOUS at 12:35

## 2022-09-15 RX ADMIN — DIAZEPAM 2.5 MG: 10 INJECTION, SOLUTION INTRAMUSCULAR; INTRAVENOUS at 12:36

## 2022-09-15 RX ADMIN — LIDOCAINE 1 PATCH: 50 PATCH TOPICAL at 12:35

## 2022-09-15 NOTE — ED PROVIDER NOTES
History  Chief Complaint   Patient presents with    Pain     States 2 weeks of sciatic pain in right lower back and leg and home treatment hasn't relieved the pain  Patient is a 50-year-old male presenting for evaluation of right-sided low back pain which he reports has been chronic however has been worsening over the past 2 weeks  Patient reports he has a history of a laminectomy in his spine multiple years ago and reports he has been taking Tylenol and Aleve at home with no relief for his pain  Patient describes as a gradual onset sharp and shooting pain which radiates from his right buttocks down into his leg  Patient denies any numbness or tingling, weakness or paresthesias, bowel or bladder incontinence, saddle anesthesia, direct traumatic exacerbating events  Patient reports no dysuria, hematuria, abdominal pain, nausea vomiting  Patient denies any fevers or chills  Patient reports this pain is constant and similar to previous sciatic pain however states that it is worse and his medications have not been alleviating his pain  Pain worse with bending, twisting moving, no alleviating factors  Prior to Admission Medications   Prescriptions Last Dose Informant Patient Reported? Taking? amLODIPine (NORVASC) 10 mg tablet   No No   Sig: Take 1 tablet (10 mg total) by mouth daily   atorvastatin (LIPITOR) 40 mg tablet   No No   Sig: Take 1 tablet (40 mg total) by mouth daily   esomeprazole (NexIUM) 40 MG capsule   No No   Sig: Take one capsule (40mg) by mouth daily  losartan (COZAAR) 25 mg tablet   No No   Sig: Take 1 tablet (25 mg total) by mouth daily      Facility-Administered Medications: None       Past Medical History:   Diagnosis Date    Colon polyp     GERD (gastroesophageal reflux disease)     Hyperlipidemia     Hypertension     Sleep apnea        Past Surgical History:   Procedure Laterality Date    BACK SURGERY  2009    COLONOSCOPY         History reviewed   No pertinent family history  I have reviewed and agree with the history as documented  E-Cigarette/Vaping    E-Cigarette Use Never User      E-Cigarette/Vaping Substances    Nicotine No     THC No     CBD No     Flavoring No     Other No     Unknown No      Social History     Tobacco Use    Smoking status: Former Smoker     Packs/day: 0 50     Types: Cigars     Quit date: 2020     Years since quittin 1    Smokeless tobacco: Never Used   Vaping Use    Vaping Use: Never used   Substance Use Topics    Alcohol use: Yes     Comment: 2/3 times a week 1 whole bottle of wine    Drug use: Never       Review of Systems   Constitutional: Negative for chills, fatigue and fever  HENT: Negative for congestion, ear pain, rhinorrhea and sore throat  Eyes: Negative for redness  Respiratory: Negative for chest tightness and shortness of breath  Cardiovascular: Negative for chest pain and palpitations  Gastrointestinal: Negative for abdominal pain, nausea and vomiting  Genitourinary: Negative for dysuria and hematuria  Musculoskeletal: Positive for back pain  Skin: Negative for rash  Neurological: Negative for dizziness, syncope, light-headedness and numbness  Physical Exam  Physical Exam  Vitals and nursing note reviewed  Constitutional:       Appearance: Normal appearance  He is well-developed  HENT:      Head: Normocephalic and atraumatic  Eyes:      General: No scleral icterus  Pupils: Pupils are equal, round, and reactive to light  Cardiovascular:      Rate and Rhythm: Normal rate and regular rhythm  Pulses: Normal pulses  Pulmonary:      Effort: Pulmonary effort is normal  No respiratory distress  Breath sounds: No stridor  Abdominal:      General: There is no distension  Palpations: There is no mass  Musculoskeletal:      Cervical back: Normal range of motion          Back:       Comments: No midline spinal tenderness, deformities, crepitus, step-off, skin changes noted to the area of pain  Skin:     General: Skin is warm and dry  Capillary Refill: Capillary refill takes less than 2 seconds  Coloration: Skin is not jaundiced  Neurological:      Mental Status: He is alert and oriented to person, place, and time  Gait: Gait normal    Psychiatric:         Mood and Affect: Mood normal          Vital Signs  ED Triage Vitals [09/15/22 1208]   Temperature Pulse Respirations Blood Pressure SpO2   (!) 97 4 °F (36 3 °C) 89 20 (!) 187/103 98 %      Temp Source Heart Rate Source Patient Position - Orthostatic VS BP Location FiO2 (%)   Oral Monitor Lying Left arm --      Pain Score       10 - Worst Possible Pain           Vitals:    09/15/22 1208   BP: (!) 187/103   Pulse: 89   Patient Position - Orthostatic VS: Lying         Visual Acuity      ED Medications  Medications   ketorolac (TORADOL) injection 15 mg (has no administration in time range)   diazepam (VALIUM) injection 2 5 mg (has no administration in time range)   lidocaine (LIDODERM) 5 % patch 1 patch (has no administration in time range)       Diagnostic Studies  Results Reviewed     None                 No orders to display              Procedures  Procedures         ED Course                                             MDM  Number of Diagnoses or Management Options  Diagnosis management comments: Denies history of severe or worsening lower extremity weakness/numbness  Denies history of saddle anesthesia/perineal anesthesia  Denies bowel or bladder incontinence/retention   History does not suggest diagnosis of cauda equina syndrome   Patient denies history of IVDA, denies history of fevers, no recent surgeries or any procedures to suggest a transient bacteremia leading to a diagnosis of subdural abscess  Denies history of blood thinner use with recent history of lumbar puncture or any violation of the epidural space to suggest history of epidural hematoma   Therefore these above diagnoses (cauda equina syndrome, epidural abscess, epidural hematoma) were not pursued with diagnostic imaging  All imaging and/or lab testing discussed with patient, strict return to ED precautions discussed  Patient recommended to follow up promptly with appropriate outpatient provider  Patient and/or family members verbalizes understanding and agrees with plan  Patient is stable for discharge      Portions of the record may have been created with voice recognition software  Occasional wrong word or "sound a like" substitutions may have occurred due to the inherent limitations of voice recognition software  Read the chart carefully and recognize, using context, where substitutions have occurred  Disposition  Final diagnoses:   Acute exacerbation of chronic low back pain     Time reflects when diagnosis was documented in both MDM as applicable and the Disposition within this note     Time User Action Codes Description Comment    9/15/2022 12:20 PM Medina Fritz [M54 50,  G89 29] Acute exacerbation of chronic low back pain       ED Disposition     ED Disposition   Discharge    Condition   Good    Date/Time   Thu Sep 15, 2022 12:20 PM    Comment   Dominga Tamayo discharge to home/self care                 Follow-up Information     Follow up With Specialties Details Why Contact Info Additional Information    [facility] Physical Therapy Schedule an appointment as soon as possible for a visit in 1 day  [phone]          Patient's Medications   Discharge Prescriptions    ACETAMINOPHEN (TYLENOL) 500 MG TABLET    Take 1 tablet (500 mg total) by mouth every 6 (six) hours as needed for mild pain       Start Date: 9/15/2022 End Date: --       Order Dose: 500 mg       Quantity: 30 tablet    Refills: 0    CYCLOBENZAPRINE (FLEXERIL) 10 MG TABLET    Take 1 tablet (10 mg total) by mouth 2 (two) times a day as needed for muscle spasms       Start Date: 9/15/2022 End Date: --       Order Dose: 10 mg       Quantity: 20 tablet    Refills: 0    LIDOCAINE (XYLOCAINE) 2 % TOPICAL GEL    Apply topically as needed for mild pain       Start Date: 9/15/2022 End Date: --       Order Dose: --       Quantity: 30 mL    Refills: 0    NAPROXEN (EC NAPROSYN) 500 MG EC TABLET    Take 1 tablet (500 mg total) by mouth 2 (two) times a day with meals       Start Date: 9/15/2022 End Date: 9/15/2023       Order Dose: 500 mg       Quantity: 20 tablet    Refills: 0           PDMP Review       Value Time User    PDMP Reviewed  Yes 12/22/2020  1:20 PM [facility] Provider  Electronically Signed by           Grace Garcia PA-C  09/15/22 1858

## 2022-09-16 ENCOUNTER — NURSE TRIAGE (OUTPATIENT)
Dept: PHYSICAL THERAPY | Facility: OTHER | Age: 57
End: 2022-09-16

## 2022-09-16 ENCOUNTER — TELEPHONE (OUTPATIENT)
Dept: PHYSICAL THERAPY | Facility: OTHER | Age: 57
End: 2022-09-16

## 2022-09-16 DIAGNOSIS — M54.41 ACUTE RIGHT-SIDED LOW BACK PAIN WITH RIGHT-SIDED SCIATICA: Primary | ICD-10-CM

## 2022-09-16 NOTE — TELEPHONE ENCOUNTER
Additional Information   Negative: Is this related to a work injury?  Negative: Is this related to an MVA?  Negative: Are you currently recieving homecare services? Background - Initial Assessment  Clinical complaint: Pain is right side low back, radiates down right side, buttocks through knee and to the ankle  Toes on right foot get numb  Started 2 weeks ago  RICHA  Was seen in ED on 9/15/22  HX of left back and hip pain  Was triaged by csp 12/2021, and went to PT for the left side  HX of laminectomy in spine     Date of onset: 2 weeks   Frequency of pain: constant  Quality of pain: sharp and shooting    Protocols used: SL AMB COMPREHENSIVE SPINE PROGRAM PROTOCOL

## 2022-09-16 NOTE — TELEPHONE ENCOUNTER
I just left a message with the patient clarifying that we did not discuss his back pain at our visit, that we referred him for nerve conduction testing to address the numbness he is feeling in his arms which is the reason why he scheduled an appointment  AS well, I informed him that is lab results are normal     May someone call him and let him know I have reviewed his most recent visit to the ED for the back pain  He is welcome to follow up with me so we can discuss that further as we do not want him to be experiencing pain  Let him know we can manage his back pain from clinic and he does not need to go to the ED unless he has back pain with concomitant loss of rectal tone or loss of sensation  Thanks in advance

## 2022-09-16 NOTE — TELEPHONE ENCOUNTER
Kelly Hauser do not have a pager and have not received a tiger text  What kind of back specialist does he want? We did not discuss back pain at our most recent visit  I put in for a referral for nerve conduction testing on his visit with me  Did he follow-through?

## 2022-09-16 NOTE — TELEPHONE ENCOUNTER
When I say nikky, Tiger text is what I'm referring to  And no, [facility]ison did not tiger text you because they did not chart review before sending their message  There is communication on another encounter  He has an appt with Physiatry 9/19 so I placed an order for that just in case they require a PCP referral     No he did not complete the testing ordered but as I said he's not reporting the same problems as in your visit note which is why I wanted them to message you so you could call him and clarify  But he went to the ER yesterday and he already has f/u with you 10/4 so I don't think anything further is needed

## 2022-09-16 NOTE — TELEPHONE ENCOUNTER
Call placed to the patient per [facility] referral     Unable to connect, goes to a fast busy signal right away  This is the 1st attempt to reach the patient  Will defer per protocol      **Looks like PCP put in a referral for physiatry, possibly [facility]??**

## 2022-09-16 NOTE — TELEPHONE ENCOUNTER
Additional Information   Negative: Has the patient had unexplained weight loss?  Negative: Does the patient have a fever?  Negative: Is the patient experiencing blood in sputum?  Negative: Is the patient experiencing urine retention?  Negative: Is the patient experiencing acute drop foot or paralysis?  Negative: Has the patient experienced major trauma? (fall from height, high speed collision, direct blow to spine) and is also experiencing nausea, light-headedness, or loss of consciousness?  Negative: Is this a chronic condition?  Commented on: Background - Initial Assessment     Patient states a history of back surgery and that up until 2 weeks ago his back had been good  Protocols used: SL AMB COMPREHENSIVE SPINE PROGRAM PROTOCOL    This RN did review in detail the [facility] and what we can provide for their back pain  Patient is agreeable to being triaged by this RN and would like to proceed with Physical Therapy  Referral was placed for Physical Therapy at the [facility] site  Patients information was sent to the  to make evaluation appointment  Patient made aware that the PT office  will be calling to schedule the appointment  Patient was provided with the phone number to the PT office  No further questions and/or concerns were voiced by the patient at this time  Patient states understanding of the referral that was placed  Referral Closed

## 2022-09-26 ENCOUNTER — PATIENT OUTREACH (OUTPATIENT)
Dept: FAMILY MEDICINE CLINIC | Facility: CLINIC | Age: 57
End: 2022-09-26

## 2022-09-26 NOTE — PROGRESS NOTES
Chart Review  09/26/2022    Per Chart Review, [facility] had call multiple of times Inocencia Robertson regarding [employer] application  Inocencia Robertson had not answer phone calls neither call back after contact letter was sent  This referral received from [address] [place] [address] to assist with Housing Problems and Waiver Program for [employer] (Neptali's Father) will be closed on my behalf today 09/26/2022 due loss of communication

## 2022-09-27 ENCOUNTER — PATIENT OUTREACH (OUTPATIENT)
Dept: FAMILY MEDICINE CLINIC | Facility: CLINIC | Age: 57
End: 2022-09-27

## 2022-09-27 NOTE — PROGRESS NOTES
ASHISH SHARMA received IB message from [facility] regarding assistance patient apply for Waiver services for his father and housing  [facility] Christiane Nguyễn attempted to follow-up with patient in multiple occasion to continue with the process but patient has not return call or text messages  [facility] sent letter as well  [facility] and ASHISH SHARMA discussed the case and it was determine after multiple attempts to contact patient case will get closed due to lack of response and non-compliance from patient  Will remains available for future consult as needed  No further outreach scheduled at this time

## 2022-10-04 ENCOUNTER — OFFICE VISIT (OUTPATIENT)
Dept: FAMILY MEDICINE CLINIC | Facility: CLINIC | Age: 57
End: 2022-10-04

## 2022-10-04 VITALS
RESPIRATION RATE: 16 BRPM | HEIGHT: 69 IN | SYSTOLIC BLOOD PRESSURE: 164 MMHG | WEIGHT: 221.8 LBS | BODY MASS INDEX: 32.85 KG/M2 | OXYGEN SATURATION: 99 % | DIASTOLIC BLOOD PRESSURE: 82 MMHG | HEART RATE: 67 BPM | TEMPERATURE: 97.1 F

## 2022-10-04 DIAGNOSIS — G56.03 BILATERAL CARPAL TUNNEL SYNDROME: ICD-10-CM

## 2022-10-04 DIAGNOSIS — R20.0 EXTREMITY NUMBNESS: Primary | ICD-10-CM

## 2022-10-04 DIAGNOSIS — G56.23 ULNAR NEUROPATHY OF BOTH UPPER EXTREMITIES: ICD-10-CM

## 2022-10-04 DIAGNOSIS — I10 ESSENTIAL HYPERTENSION: ICD-10-CM

## 2022-10-04 DIAGNOSIS — R35.1 NOCTURIA: ICD-10-CM

## 2022-10-04 PROCEDURE — 3079F DIAST BP 80-89 MM HG: CPT | Performed by: FAMILY MEDICINE

## 2022-10-04 PROCEDURE — 3077F SYST BP >= 140 MM HG: CPT | Performed by: FAMILY MEDICINE

## 2022-10-04 PROCEDURE — 99214 OFFICE O/P EST MOD 30 MIN: CPT | Performed by: FAMILY MEDICINE

## 2022-10-04 NOTE — PROGRESS NOTES
Name: Cristofer Lam      : 1965      MRN: [id number]  Encounter Provider: Aster Silva MD  Encounter Date: 10/4/2022   [facility]     1  Extremity numbness  Assessment & Plan:  -unable to perform EMG from last visit  -unclear if still present this visit     PLAN  -f/u if patient decides to return to clinic    Orders:  -     Ambulatory Referral to Physical Therapy; Future    2  Nocturia  Assessment & Plan:  -Likely secondary to bph  -patient declined medication  -PSA normal  -family history of brother and father with prostrate cancer     PLAN  -consider offering medication down the road  -consider counseling to avoid liquids before bedtime       3  Essential hypertension  Assessment & Plan:  -Patient not at goal of 140/80 as per JNC 8  -Today bp 164/82 by MA and unable to perform manually as patient declined  - bp in ED visit for back pain on 9/15 was 187/103  - home meds include amlodipine 10mg daily  Unable to perform medication review at this visit    PLAN  -f/u should patient return to our clinic   -consider bp log  -review medication use      4  Ulnar neuropathy of both upper extremities  Assessment & Plan:  -unable to obtain EMG and patient declined physical exam  As such, unclear if there has been improvement or worsening   -patient works at a desk all day as a , may be the cause     PLAN  -consider EMG at future visit  -re-evaluate with physical exam, if muscle weakness noted consider MRI spine        5  Bilateral carpal tunnel syndrome  Assessment & Plan:  -positive phalen sign on last visit with me 2022  -unclear progression at todays visit     PLAN  -consider EMG in future  -re-evaluate with physical exam, if muscle weakness noted consider MRI spine            Subjective      60 yo male patient PMH of HTN and extremity numbness presents today to f/u on both HTN and extremity numbness  Interim history includes a visit to the ED on 9/15 for sciatica where he was given toradol, valium, and lidoderm patch and discharged home  Patient reports an MRI was done at a different location and all he would like for today's visit is PT referral; he provided a business card to a [facility] facility associated with [facility] saying this is where he would like to receive the PT for his sciatica  Patient was frustrated throughout the visit and walked out before visit completion  Patient was escorted out and PT referral was provided  We did go over his recent labs and discussed the normal PSA value: patient was concerned because both his father and brother were diagnosed with prostate cancer  Did explain to patient that we have medications to help with nocturia and if down the road he would be interested to reach out  Patient declined medications at this time  Additionally, we discussed his normal B12 and folate levels and his unremarkable CBC and briefly explained that we are still not sure what is causing the extremity numbness  Was unable to perform medication review and physical exam as patient denied the later and left the visit early for the former  Patient reports he did not have EMG performed as per last visit and mentioned complications completing EMG that remain unclear  Review of Systems    Current Outpatient Medications on File Prior to Visit   Medication Sig    acetaminophen (TYLENOL) 500 mg tablet Take 1 tablet (500 mg total) by mouth every 6 (six) hours as needed for mild pain    amLODIPine (NORVASC) 10 mg tablet Take 1 tablet (10 mg total) by mouth daily    atorvastatin (LIPITOR) 40 mg tablet Take 1 tablet (40 mg total) by mouth daily    cyclobenzaprine (FLEXERIL) 10 mg tablet Take 1 tablet (10 mg total) by mouth 2 (two) times a day as needed for muscle spasms    esomeprazole (NexIUM) 40 MG capsule Take one capsule (40mg) by mouth daily      lidocaine (XYLOCAINE) 2 % topical gel Apply topically as needed for mild pain    losartan (COZAAR) 25 mg tablet Take 1 tablet (25 mg total) by mouth daily    naproxen (EC NAPROSYN) 500 MG EC tablet Take 1 tablet (500 mg total) by mouth 2 (two) times a day with meals       Objective     /82 (BP Location: Right arm, Patient Position: Sitting, Cuff Size: Adult)   Pulse 67   Temp (!) 97 1 °F (36 2 °C) (Temporal)   Resp 16   Ht 5' 9" (1 753 m)   Wt 101 kg (221 lb 12 8 oz)   SpO2 99%   BMI 32 75 kg/m²     Physical Exam  Constitutional:       Comments: Patient declined physical exam        Delia Banuelos MD

## 2022-10-05 NOTE — ASSESSMENT & PLAN NOTE
-Likely secondary to bph  -patient declined medication  -PSA normal  -family history of brother and father with prostrate cancer     PLAN  -consider offering medication down the road  -consider counseling to avoid liquids before bedtime

## 2022-10-05 NOTE — PROGRESS NOTES
Name: Rebecca Flores      : 1965      MRN: [id number]  Encounter Provider: Cruz Ortiz MD  Encounter Date: 10/4/2022   Encounter department: [address] [place] [address]     1  Extremity numbness  Assessment & Plan:  -unable to perform EMG from last visit  -unclear if still present this visit     PLAN  -f/u if patient decides to return to clinic    Orders:  -     Ambulatory Referral to Physical Therapy; Future    2  Nocturia  Assessment & Plan:  -Likely secondary to bph  -patient declined medication  -PSA normal  -family history of brother and father with prostrate cancer     PLAN  -consider offering medication down the road  -consider counseling to avoid liquids before bedtime       3  Essential hypertension  Assessment & Plan:  -Patient not at goal of 140/80 as per JNC 8  -Today bp 164/82 by MA and unable to perform manually as patient declined  - bp in ED visit for back pain on 9/15 was 187/103  - home meds include amlodipine 10mg daily  Unable to perform medication review at this visit    PLAN  -f/u should patient return to our clinic   -consider bp log  -review medication use      4  Ulnar neuropathy of both upper extremities  Assessment & Plan:  -unable to obtain EMG and patient declined physical exam  As such, unclear if there has been improvement or worsening   -patient works at a desk all day as a , may be the cause     PLAN  -consider EMG at future visit  -re-evaluate with physical exam, if muscle weakness noted consider MRI spine        5  Bilateral carpal tunnel syndrome  Assessment & Plan:  -positive phalen sign on last visit with me 2022  -unclear progression at todays visit     PLAN  -consider EMG in future  -re-evaluate with physical exam, if muscle weakness noted consider MRI spine            Subjective      62 yo male patient PMH of HTN and extremity numbness presents today to f/u on both HTN and extremity numbness  Interim history includes a visit to the ED on 9/15 for sciatica where he was given toradol, valium, and lidoderm patch and discharged home  Patient reports an MRI was done at a different location and all he would like for today's visit is PT referral; he provided a business card to a [facility] associated with [facility] saying this is where he would like to receive the PT for his sciatica  Patient was frustrated throughout the visit and walked out before visit completion  Patient was escorted out and PT referral was provided  We did go over his recent labs and discussed the normal PSA value: patient was concerned because both his father and brother were diagnosed with prostate cancer  Did explain to patient that we have medications to help with nocturia and if down the road he would be interested to reach out  Patient declined medications at this time  Additionally, we discussed his normal B12 and folate levels and his unremarkable CBC and briefly explained that we are still not sure what is causing the extremity numbness  Was unable to perform medication review and physical exam as patient denied the later and left the visit early for the former  Patient reports he did not have EMG performed as per last visit and mentioned complications completing EMG that remain unclear  Review of Systems    Current Outpatient Medications on File Prior to Visit   Medication Sig    acetaminophen (TYLENOL) 500 mg tablet Take 1 tablet (500 mg total) by mouth every 6 (six) hours as needed for mild pain    amLODIPine (NORVASC) 10 mg tablet Take 1 tablet (10 mg total) by mouth daily    atorvastatin (LIPITOR) 40 mg tablet Take 1 tablet (40 mg total) by mouth daily    cyclobenzaprine (FLEXERIL) 10 mg tablet Take 1 tablet (10 mg total) by mouth 2 (two) times a day as needed for muscle spasms    esomeprazole (NexIUM) 40 MG capsule Take one capsule (40mg) by mouth daily      lidocaine (XYLOCAINE) 2 % topical gel Apply topically as needed for mild pain    losartan (COZAAR) 25 mg tablet Take 1 tablet (25 mg total) by mouth daily    naproxen (EC NAPROSYN) 500 MG EC tablet Take 1 tablet (500 mg total) by mouth 2 (two) times a day with meals       Objective     /82 (BP Location: Right arm, Patient Position: Sitting, Cuff Size: Adult)   Pulse 67   Temp (!) 97 1 °F (36 2 °C) (Temporal)   Resp 16   Ht 5' 9" (1 753 m)   Wt 101 kg (221 lb 12 8 oz)   SpO2 99%   BMI 32 75 kg/m²     Physical Exam  Constitutional:       Comments: -declined physical exam at this time        Sonal Cordova MD

## 2022-10-05 NOTE — ASSESSMENT & PLAN NOTE
-unable to obtain EMG and patient declined physical exam  As such, unclear if there has been improvement or worsening   -patient works at a desk all day as a , may be the cause     PLAN  -consider EMG at future visit  -re-evaluate with physical exam, if muscle weakness noted consider MRI spine

## 2022-10-05 NOTE — ASSESSMENT & PLAN NOTE
-Patient not at goal of 140/80 as per JNC 8  -Today bp 164/82 by MA and unable to perform manually as patient declined  - bp in ED visit for back pain on 9/15 was 187/103  - home meds include amlodipine 10mg daily   Unable to perform medication review at this visit    PLAN  -f/u should patient return to our clinic   -consider bp log  -review medication use

## 2022-10-05 NOTE — ASSESSMENT & PLAN NOTE
-positive phalen sign on last visit with me 9/1/2022  -unclear progression at todays visit     PLAN  -consider EMG in future  -re-evaluate with physical exam, if muscle weakness noted consider MRI spine

## 2022-10-05 NOTE — ASSESSMENT & PLAN NOTE
-unable to perform EMG from last visit  -unclear if still present this visit     PLAN  -f/u if patient decides to return to clinic

## 2022-10-28 ENCOUNTER — APPOINTMENT (EMERGENCY)
Dept: RADIOLOGY | Facility: HOSPITAL | Age: 57
End: 2022-10-28
Payer: COMMERCIAL

## 2022-10-28 ENCOUNTER — HOSPITAL ENCOUNTER (EMERGENCY)
Facility: HOSPITAL | Age: 57
Discharge: HOME | End: 2022-10-28
Attending: EMERGENCY MEDICINE
Payer: COMMERCIAL

## 2022-10-28 VITALS
DIASTOLIC BLOOD PRESSURE: 77 MMHG | RESPIRATION RATE: 16 BRPM | SYSTOLIC BLOOD PRESSURE: 141 MMHG | TEMPERATURE: 98.2 F | HEART RATE: 70 BPM | OXYGEN SATURATION: 99 %

## 2022-10-28 DIAGNOSIS — S86.012A ACHILLES TENDON RUPTURE, LEFT, INITIAL ENCOUNTER: Primary | ICD-10-CM

## 2022-10-28 PROCEDURE — 73610 X-RAY EXAM OF ANKLE: CPT

## 2022-10-28 RX ORDER — KETOROLAC TROMETHAMINE 30 MG/ML
15 INJECTION, SOLUTION INTRAMUSCULAR; INTRAVENOUS ONCE
Status: COMPLETED | OUTPATIENT
Start: 2022-10-28 | End: 2022-10-28

## 2022-10-28 RX ADMIN — KETOROLAC TROMETHAMINE 15 MG: 30 INJECTION, SOLUTION INTRAMUSCULAR; INTRAVENOUS at 18:09

## 2022-10-28 NOTE — Clinical Note
Delvin Saini was seen and treated in our emergency department on 10/28/2022  No work until cleared by Family Doctor/Orthopedics        Diagnosis:     Cici Mckee    He may return on this date: If you have any questions or concerns, please don't hesitate to call        Dao Galvan PA-C    ______________________________           _______________          _______________  Hospital Representative                              Date                                Time

## 2022-10-28 NOTE — ED PROVIDER NOTES
History  Chief Complaint   Patient presents with   • Ankle Pain     Patient reports he was pushing a container "a dumpster" patient states afterwards he was unable to bare weight on the L foot  Reporting L ankle pain  Patient is a 50-year-old male presenting to ED for evaluation of left ankle pain  Patient states that he was pushing a large container and when he pushed off with his left foot he felt a pop in the back of his ankle  He has not been able to bear weight on the extremity since this occurred  He denies any numbness in the extremity  He states that he ruptured his right Achilles tendon when he was younger and this feels the same  Prior to Admission Medications   Prescriptions Last Dose Informant Patient Reported? Taking?   acetaminophen (TYLENOL) 500 mg tablet   No No   Sig: Take 1 tablet (500 mg total) by mouth every 6 (six) hours as needed for mild pain   amLODIPine (NORVASC) 10 mg tablet   No No   Sig: Take 1 tablet (10 mg total) by mouth daily   atorvastatin (LIPITOR) 40 mg tablet   No No   Sig: Take 1 tablet (40 mg total) by mouth daily   cyclobenzaprine (FLEXERIL) 10 mg tablet   No No   Sig: Take 1 tablet (10 mg total) by mouth 2 (two) times a day as needed for muscle spasms   esomeprazole (NexIUM) 40 MG capsule   No No   Sig: Take one capsule (40mg) by mouth daily     lidocaine (XYLOCAINE) 2 % topical gel   No No   Sig: Apply topically as needed for mild pain   losartan (COZAAR) 25 mg tablet   No No   Sig: Take 1 tablet (25 mg total) by mouth daily   naproxen (EC NAPROSYN) 500 MG EC tablet   No No   Sig: Take 1 tablet (500 mg total) by mouth 2 (two) times a day with meals      Facility-Administered Medications: None       Past Medical History:   Diagnosis Date   • Colon polyp    • GERD (gastroesophageal reflux disease)    • Hyperlipidemia    • Hypertension    • Sleep apnea        Past Surgical History:   Procedure Laterality Date   • BACK SURGERY  2009   • COLONOSCOPY History reviewed  No pertinent family history  I have reviewed and agree with the history as documented  E-Cigarette/Vaping   • E-Cigarette Use Never User      E-Cigarette/Vaping Substances   • Nicotine No    • THC No    • CBD No    • Flavoring No    • Other No    • Unknown No      Social History     Tobacco Use   • Smoking status: Former Smoker     Packs/day: 0 50     Types: Cigars     Quit date: 2020     Years since quittin 2   • Smokeless tobacco: Never Used   Vaping Use   • Vaping Use: Never used   Substance Use Topics   • Alcohol use: Yes     Comment: 2-3x/week 1 bottle of wine   • Drug use: Never       Review of Systems   Constitutional: Negative for chills, diaphoresis, fatigue and fever  HENT: Negative for congestion, ear pain, mouth sores, rhinorrhea, sinus pain, sore throat and trouble swallowing  Eyes: Negative for photophobia and visual disturbance  Respiratory: Negative for cough, chest tightness, shortness of breath and wheezing  Cardiovascular: Negative for chest pain, palpitations and leg swelling  Gastrointestinal: Negative for abdominal pain, blood in stool, constipation, diarrhea, nausea and vomiting  Genitourinary: Negative for dysuria, flank pain, frequency, hematuria and urgency  Musculoskeletal: Positive for arthralgias (left ankle pain/injury)  Negative for back pain, gait problem, joint swelling, myalgias and neck pain  Skin: Negative for color change, pallor and rash  Neurological: Negative for dizziness, syncope, speech difficulty, weakness, light-headedness, numbness and headaches  Psychiatric/Behavioral: Negative for confusion and sleep disturbance  All other systems reviewed and are negative  Physical Exam  Physical Exam  Vitals and nursing note reviewed  Constitutional:       General: He is awake  He is not in acute distress  Appearance: Normal appearance  He is well-developed  He is not ill-appearing or diaphoretic     HENT: Head: Normocephalic and atraumatic  Right Ear: External ear normal       Left Ear: External ear normal       Nose: Nose normal       Mouth/Throat:      Lips: Pink  Mouth: Mucous membranes are moist    Eyes:      General: Lids are normal  No scleral icterus  Conjunctiva/sclera: Conjunctivae normal       Pupils: Pupils are equal, round, and reactive to light  Cardiovascular:      Rate and Rhythm: Normal rate and regular rhythm  Pulses: Normal pulses  Radial pulses are 2+ on the right side and 2+ on the left side  Heart sounds: Normal heart sounds, S1 normal and S2 normal    Pulmonary:      Effort: Pulmonary effort is normal  No accessory muscle usage  Breath sounds: Normal breath sounds  No stridor  No decreased breath sounds, wheezing, rhonchi or rales  Abdominal:      General: Abdomen is flat  Bowel sounds are normal  There is no distension  Palpations: Abdomen is soft  Tenderness: There is no abdominal tenderness  There is no right CVA tenderness, left CVA tenderness, guarding or rebound  Musculoskeletal:      Cervical back: Full passive range of motion without pain, normal range of motion and neck supple  No signs of trauma  No pain with movement  Normal range of motion  Right lower leg: No edema  Left lower leg: No edema  Legs:    Lymphadenopathy:      Cervical: No cervical adenopathy  Skin:     General: Skin is warm and dry  Capillary Refill: Capillary refill takes less than 2 seconds  Coloration: Skin is not cyanotic, jaundiced or pale  Neurological:      Mental Status: He is alert and oriented to person, place, and time  GCS: GCS eye subscore is 4  GCS verbal subscore is 5  GCS motor subscore is 6  Cranial Nerves: No dysarthria or facial asymmetry        Gait: Gait normal    Psychiatric:         Attention and Perception: Attention normal          Mood and Affect: Mood normal          Speech: Speech normal  Behavior: Behavior normal  Behavior is cooperative  Vital Signs  ED Triage Vitals   Temperature Pulse Respirations Blood Pressure SpO2   10/28/22 1701 10/28/22 1700 10/28/22 1700 10/28/22 1700 10/28/22 1700   98 2 °F (36 8 °C) 70 16 141/77 99 %      Temp Source Heart Rate Source Patient Position - Orthostatic VS BP Location FiO2 (%)   10/28/22 1701 10/28/22 1700 10/28/22 1700 10/28/22 1700 --   Temporal Monitor Sitting Left arm       Pain Score       10/28/22 1700       9           Vitals:    10/28/22 1700   BP: 141/77   Pulse: 70   Patient Position - Orthostatic VS: Sitting         Visual Acuity      ED Medications  Medications   ketorolac (TORADOL) injection 15 mg (15 mg Intramuscular Given 10/28/22 1809)       Diagnostic Studies  Results Reviewed     None                 XR ankle 3+ views LEFT    (Results Pending)              Procedures  Splint application    Date/Time: 10/28/2022 6:12 PM  Performed by: Mariola Blake PA-C  Authorized by: Mariola Blake PA-C   Universal Protocol:  Consent: Verbal consent obtained  Risks and benefits: risks, benefits and alternatives were discussed  Consent given by: patient  Time out: Immediately prior to procedure a "time out" was called to verify the correct patient, procedure, equipment, support staff and site/side marked as required    Timeout called at: 10/28/2022 6:12 PM   Patient understanding: patient states understanding of the procedure being performed  Required items: required blood products, implants, devices, and special equipment available  Patient identity confirmed: verbally with patient      Pre-procedure details:     Sensation:  Normal    Skin color:  Pink  Procedure details:     Laterality:  Left    Location:  Leg    Leg:  L lower leg    Strapping: no      Splint type:  Short leg (short leg in slight plantarflexion)    Supplies:  Ortho-Glass, 2 layer wrap and cotton padding  Post-procedure details:     Pain:  Unchanged    Sensation:  Normal Skin color:  Pink    Patient tolerance of procedure: Tolerated well, no immediate complications             ED Course                                             MDM  Number of Diagnoses or Management Options  Achilles tendon rupture, left, initial encounter  Diagnosis management comments: Patient is a 71-year-old male presenting to ED for evaluation of left ankle pain  X-ray shows no acute fracture  Exam consistent with an Achilles tendon rupture/tear  Patient was placed in a posterior short-leg splint in slight plantar flexion  He was given a pair of crutches and instructed to remain nonweightbearing on the extremity until seen by Orthopedics  Encouraged Tylenol/Motrin as needed for pain, elevation of the extremity and ice as needed  The management plan was discussed in detail with the patient at bedside and all questions were answered  Strict ED return instructions were discussed at bedside  Prior to discharge, both verbal and written instructions were provided  We discussed the signs and symptoms that should prompt the patient to return to the ED  All questions were answered and the patient was comfortable with the plan of care and discharged home  The patient agrees to return to the Emergency Department for concerns and/or progression of illness         Amount and/or Complexity of Data Reviewed  Tests in the radiology section of CPT®: ordered and reviewed    Patient Progress  Patient progress: stable      Disposition  Final diagnoses:   Achilles tendon rupture, left, initial encounter     Time reflects when diagnosis was documented in both MDM as applicable and the Disposition within this note     Time User Action Codes Description Comment    10/28/2022  6:52 PM Vibha Fritz [C65 [id number]] Partial Achilles tendon tear, left, initial encounter     10/28/2022  6:57 PM Vibha Gonzales Add [P12 [id number]] Achilles tendon rupture, left, initial encounter     10/28/2022  6:57 PM Abel Graham [[id number]] Achilles tendon rupture, left, initial encounter     10/28/2022  6:57 PM Indra Rivera [[id number]] Partial Achilles tendon tear, left, initial encounter       ED Disposition     ED Disposition   Discharge    Condition   Stable    Date/Time   Fri Oct 28, 2022  6:51 PM    Comment   Lashonda Phillips discharge to home/self care  Follow-up Information     Follow up With Specialties Details Why Contact Info Additional [address] [facility] Orthopedic Surgery Schedule an appointment as soon as possible for a visit   [address]  [place] [facility] [id number]  [address] [place] [address], [address], [address] [place], [place], [address]   [facility] Emergency Department Emergency Medicine  If symptoms worsen [facility] [id number]  [address] [place] [address] Emergency Department, [address] [place], [place], [address]          Discharge Medication List as of 10/28/2022  6:58 PM      CONTINUE these medications which have NOT CHANGED    Details   acetaminophen (TYLENOL) 500 mg tablet Take 1 tablet (500 mg total) by mouth every 6 (six) hours as needed for mild pain, Starting u 9/15/2022, Normal      amLODIPine (NORVASC) 10 mg tablet Take 1 tablet (10 mg total) by mouth daily, Starting Thu 9/1/2022, Normal      atorvastatin (LIPITOR) 40 mg tablet Take 1 tablet (40 mg total) by mouth daily, Starting Mon 12/28/2020, Normal      cyclobenzaprine (FLEXERIL) 10 mg tablet Take 1 tablet (10 mg total) by mouth 2 (two) times a day as needed for muscle spasms, Starting u 9/15/2022, Normal      esomeprazole (NexIUM) 40 MG capsule Take one capsule (40mg) by mouth daily  , Normal      lidocaine (XYLOCAINE) 2 % topical gel Apply topically as needed for mild pain, Starting Thu 9/15/2022, Normal      losartan (COZAAR) 25 mg tablet Take 1 tablet (25 mg total) by mouth daily, Starting Thu 9/1/2022, Normal      naproxen (EC NAPROSYN) 500 MG EC tablet Take 1 tablet (500 mg total) by mouth 2 (two) times a day with meals, Starting Thu 9/15/2022, Until Fri 9/15/2023, Normal                 PDMP Review       Value Time User    PDMP Reviewed  Yes 12/22/2020  1:20 PM Giovanni Westbrook, [facility] Provider  Electronically Signed by           Randy Loera PA-C  10/28/22 1918

## 2022-11-01 ENCOUNTER — OFFICE VISIT (OUTPATIENT)
Dept: OBGYN CLINIC | Facility: CLINIC | Age: 57
End: 2022-11-01

## 2022-11-01 VITALS — WEIGHT: 221 LBS | BODY MASS INDEX: 31.64 KG/M2 | HEIGHT: 70 IN

## 2022-11-01 DIAGNOSIS — S86.012A ACHILLES TENDON RUPTURE, LEFT, INITIAL ENCOUNTER: Primary | ICD-10-CM

## 2022-11-01 NOTE — PROGRESS NOTES
Ortho Sports Medicine New Patient Visit     Assesment:   61 y o  male with left achilles rupture     Plan: We had a long discussion regarding operative and nonoperative treatment options  We discussed that operative repair leads to a lower re-tear rate, and better strength in many cases  We also discussed that operative repair has a higher risk of complications including infection or nerve injury  At a previous contralateral Achilles repair and understands the surgical process  He states that he does not want to go through surgery again this time would prefer to proceed with non operative management  I placed him in a short-leg cast in 20° of plantar flexion today  Recommended frequent elevation to avoid cast complications and help control pain and swelling  I provided cast care education as well  We will keep the cast on for 2 weeks and see him back to convert to a boot with a heel wedge at that time  Cast application    Date/Time: 11/1/2022 11:10 AM  Performed by: Oneil Cervantes MD  Authorized by: Oneil Cervantes MD   Universal Protocol:  Consent: Verbal consent obtained  Risks and benefits: risks, benefits and alternatives were discussed  Consent given by: patient  Patient understanding: patient states understanding of the procedure being performed      Pre-procedure details:     Sensation:  Normal  Procedure details:     Laterality:  Left    Location:  AnkleCast type:  Short leg      Supplies:  Elastic bandage, cotton padding and fiberglass  Post-procedure details:     Pain:  Unchanged    Sensation:  Normal    Patient tolerance of procedure: Tolerated well, no immediate complications          Follow up:    Return in about 2 weeks (around 11/15/2022)  Chief Complaint   Patient presents with   • Left Ankle - Pain       History of Present Illness: The patient is a 61 y o  male presenting with pain in the left ankle for the last 3 days    He states that he was pushing a heavy object and felt a pop in the posterior aspect of the ankle when it 1st started  He was seen in the emergency department where he was found to have a likely Achilles tendon rupture was placed in a splint  He has been in the splint since then nonweightbearing on crutches  He has pain when extremity is in a dependent position  He denies any numbness or tingling  Past Medical, Social and Family History:  Past Medical History:   Diagnosis Date   • Colon polyp    • GERD (gastroesophageal reflux disease)    • Hyperlipidemia    • Hypertension    • Sleep apnea      Past Surgical History:   Procedure Laterality Date   • BACK SURGERY  2009   • COLONOSCOPY       No Known Allergies  Current Outpatient Medications on File Prior to Visit   Medication Sig Dispense Refill   • acetaminophen (TYLENOL) 500 mg tablet Take 1 tablet (500 mg total) by mouth every 6 (six) hours as needed for mild pain 30 tablet 0   • amLODIPine (NORVASC) 10 mg tablet Take 1 tablet (10 mg total) by mouth daily 90 tablet 1   • atorvastatin (LIPITOR) 40 mg tablet Take 1 tablet (40 mg total) by mouth daily 90 tablet 1   • cyclobenzaprine (FLEXERIL) 10 mg tablet Take 1 tablet (10 mg total) by mouth 2 (two) times a day as needed for muscle spasms 20 tablet 0   • esomeprazole (NexIUM) 40 MG capsule Take one capsule (40mg) by mouth daily  90 capsule 0   • lidocaine (XYLOCAINE) 2 % topical gel Apply topically as needed for mild pain 30 mL 0   • losartan (COZAAR) 25 mg tablet Take 1 tablet (25 mg total) by mouth daily 90 tablet 1   • naproxen (EC NAPROSYN) 500 MG EC tablet Take 1 tablet (500 mg total) by mouth 2 (two) times a day with meals 20 tablet 0     No current facility-administered medications on file prior to visit       Social History     Socioeconomic History   • Marital status: Single     Spouse name: Not on file   • Number of children: Not on file   • Years of education: Not on file   • Highest education level: Not on file   Occupational History   • Not on file   Tobacco Use   • Smoking status: Former Smoker     Packs/day: 0 50     Types: Cigars     Quit date: 2020     Years since quittin 2   • Smokeless tobacco: Never Used   Vaping Use   • Vaping Use: Never used   Substance and Sexual Activity   • Alcohol use: Yes     Comment: 2-3x/week 1 bottle of wine   • Drug use: Never   • Sexual activity: Yes     Partners: Female     Comment: same for 25 years   Other Topics Concern   • Not on file   Social History Narrative   • Not on file     Social Determinants of Health     Financial Resource Strain: Not on file   Food Insecurity: Not on file   Transportation Needs: No Transportation Needs   • Lack of Transportation (Medical): No   • Lack of Transportation (Non-Medical): No   Physical Activity: Not on file   Stress: Not on file   Social Connections: Not on file   Intimate Partner Violence: Not on file   Housing Stability: Not on file         I have reviewed the past medical, surgical, social and family history, medications and allergies as documented in the EMR  Review of systems: ROS is negative other than that noted in the HPI  Constitutional: Negative for fatigue and fever  HENT: Negative for sore throat  Respiratory: Negative for shortness of breath  Cardiovascular: Negative for chest pain  Gastrointestinal: Negative for abdominal pain  Endocrine: Negative for cold intolerance and heat intolerance  Genitourinary: Negative for flank pain  Musculoskeletal: Negative for back pain  Skin: Negative for rash  Allergic/Immunologic: Negative for immunocompromised state  Neurological: Negative for dizziness  Psychiatric/Behavioral: Negative for agitation  Physical Exam:    Height 5' 10" (1 778 m), weight 100 kg (221 lb)      General/Constitutional: NAD, well developed, well nourished  HENT: Normocephalic, atraumatic  CV: Intact distal pulses, regular rate  Resp: No respiratory distress or labored breathing  Lymphatic: No lymphadenopathy palpated  Neuro: Alert and Oriented x 3, no focal deficits  Psych: Normal mood, normal affect  Skin: Warm, dry, no rashes, no erythema      Left Ankle Exam:   Left ankle with no skin lesions  There is swelling over the Achilles with a visible defect  Tender to palpation in this area again with palpable defect  No other areas of pain or tenderness  He has weakness with plantar flexion a positive Carranza test   Neurovascular intact distally  Knee Imaging    X-rays of the left ankle from 10/20/2022 were reviewed interpreted by me  These show no acute fractures mild degenerative changes  There is a soft tissue swelling over the posterior aspect of the ankle

## 2022-11-18 ENCOUNTER — OFFICE VISIT (OUTPATIENT)
Dept: OBGYN CLINIC | Facility: CLINIC | Age: 57
End: 2022-11-18

## 2022-11-18 VITALS — HEIGHT: 70 IN | WEIGHT: 221 LBS | BODY MASS INDEX: 31.64 KG/M2

## 2022-11-18 DIAGNOSIS — S86.012A ACHILLES TENDON RUPTURE, LEFT, INITIAL ENCOUNTER: Primary | ICD-10-CM

## 2022-11-18 NOTE — PROGRESS NOTES
Ortho Sports Medicine  Follow Up Patient Visit     Assesment:   61 y o  male with left achilles rupture being treated non operatively    Plan:    Cast removed today  Transition to a Cam boot with 3 heel wedges  I instructed him to wear this at all times  He may progress to weight-bearing as tolerated in the boot with the heel wedges  I will see him back in 2 weeks to progress the next stage of non operative treatment  Follow up:    Return in about 2 weeks (around 12/2/2022)  Chief Complaint   Patient presents with   • Left Ankle - Follow-up       History of Present Illness: The patient is a 61 y o  male following up in his cast for non operative treatment of Achilles tendon tear  He has been doing well in the cast and falling precautions  Minimal pain at the tear site  Denies any numbness tingling or issues with the cast       From previous history:  He initially presented  with pain in the left ankle for the last 3 days  He states that he was pushing a heavy object and felt a pop in the posterior aspect of the ankle when it 1st started  He was seen in the emergency department where he was found to have a likely Achilles tendon rupture was placed in a splint  He has been in the splint since then nonweightbearing on crutches  He has pain when extremity is in a dependent position  He denies any numbness or tingling        Past Medical, Social and Family History:  Past Medical History:   Diagnosis Date   • Colon polyp    • GERD (gastroesophageal reflux disease)    • Hyperlipidemia    • Hypertension    • Sleep apnea      Past Surgical History:   Procedure Laterality Date   • BACK SURGERY  2009   • COLONOSCOPY       No Known Allergies  Current Outpatient Medications on File Prior to Visit   Medication Sig Dispense Refill   • acetaminophen (TYLENOL) 500 mg tablet Take 1 tablet (500 mg total) by mouth every 6 (six) hours as needed for mild pain 30 tablet 0   • amLODIPine (NORVASC) 10 mg tablet Take 1 tablet (10 mg total) by mouth daily 90 tablet 1   • atorvastatin (LIPITOR) 40 mg tablet Take 1 tablet (40 mg total) by mouth daily 90 tablet 1   • cyclobenzaprine (FLEXERIL) 10 mg tablet Take 1 tablet (10 mg total) by mouth 2 (two) times a day as needed for muscle spasms 20 tablet 0   • esomeprazole (NexIUM) 40 MG capsule Take one capsule (40mg) by mouth daily  90 capsule 0   • lidocaine (XYLOCAINE) 2 % topical gel Apply topically as needed for mild pain 30 mL 0   • losartan (COZAAR) 25 mg tablet Take 1 tablet (25 mg total) by mouth daily 90 tablet 1   • naproxen (EC NAPROSYN) 500 MG EC tablet Take 1 tablet (500 mg total) by mouth 2 (two) times a day with meals 20 tablet 0     No current facility-administered medications on file prior to visit  Social History     Socioeconomic History   • Marital status: Single     Spouse name: Not on file   • Number of children: Not on file   • Years of education: Not on file   • Highest education level: Not on file   Occupational History   • Not on file   Tobacco Use   • Smoking status: Former     Packs/day: 0 50     Types: Cigars, Cigarettes     Quit date: 2020     Years since quittin 3   • Smokeless tobacco: Never   Vaping Use   • Vaping Use: Never used   Substance and Sexual Activity   • Alcohol use: Yes     Comment: 2-3x/week 1 bottle of wine   • Drug use: Never   • Sexual activity: Yes     Partners: Female     Comment: same for 25 years   Other Topics Concern   • Not on file   Social History Narrative   • Not on file     Social Determinants of Health     Financial Resource Strain: Not on file   Food Insecurity: Not on file   Transportation Needs: No Transportation Needs   • Lack of Transportation (Medical): No   • Lack of Transportation (Non-Medical):  No   Physical Activity: Not on file   Stress: Not on file   Social Connections: Not on file   Intimate Partner Violence: Not on file   Housing Stability: Not on file         I have reviewed the past medical, surgical, social and family history, medications and allergies as documented in the EMR  Review of systems: ROS is negative other than that noted in the HPI  Constitutional: Negative for fatigue and fever  HENT: Negative for sore throat  Respiratory: Negative for shortness of breath  Cardiovascular: Negative for chest pain  Gastrointestinal: Negative for abdominal pain  Endocrine: Negative for cold intolerance and heat intolerance  Genitourinary: Negative for flank pain  Musculoskeletal: Negative for back pain  Skin: Negative for rash  Allergic/Immunologic: Negative for immunocompromised state  Neurological: Negative for dizziness  Psychiatric/Behavioral: Negative for agitation  Physical Exam:    Height 5' 10" (1 778 m), weight 100 kg (221 lb)  General/Constitutional: NAD, well developed, well nourished  HENT: Normocephalic, atraumatic  CV: Intact distal pulses, regular rate  Resp: No respiratory distress or labored breathing  Lymphatic: No lymphadenopathy palpated  Neuro: Alert and Oriented x 3, no focal deficits  Psych: Normal mood, normal affect  Skin: Warm, dry, no rashes, no erythema      Left Ankle Exam:   Left ankle with no skin lesions  Defect in Achilles tendon is less evident Tender to palpation in this area again with palpable defect  No other areas of pain or tenderness  Neurovascular intact distally        Knee Imaging    No new imaging reviewed today

## 2022-11-22 DIAGNOSIS — K21.9 GASTROESOPHAGEAL REFLUX DISEASE, UNSPECIFIED WHETHER ESOPHAGITIS PRESENT: ICD-10-CM

## 2022-11-26 RX ORDER — ESOMEPRAZOLE MAGNESIUM 40 MG/1
CAPSULE, DELAYED RELEASE ORAL
Qty: 90 CAPSULE | Refills: 0 | Status: SHIPPED | OUTPATIENT
Start: 2022-11-26

## 2022-12-02 ENCOUNTER — OFFICE VISIT (OUTPATIENT)
Dept: OBGYN CLINIC | Facility: CLINIC | Age: 57
End: 2022-12-02

## 2022-12-02 VITALS — WEIGHT: 221 LBS | BODY MASS INDEX: 31.64 KG/M2 | HEIGHT: 70 IN

## 2022-12-02 DIAGNOSIS — S86.012A ACHILLES TENDON RUPTURE, LEFT, INITIAL ENCOUNTER: Primary | ICD-10-CM

## 2022-12-04 NOTE — PROGRESS NOTES
Ortho Sports Medicine  Follow Up Patient Visit     Assesment:   61 y o  male with left achilles rupture being treated non operatively    Plan:    Continue WBAT in boot with 3 wedges  Follow up in 2 weeks to decrease to 2 wedges  Cotninue nonop treatment per protocol  No motion yet  Reviewed these precautions with the patient  Chief Complaint   Patient presents with   • Left Ankle - Follow-up       History of Present Illness: The patient is a 61 y o  male following up in his cast for non operative treatment of Achilles tendon tear  Has been WBAT  Has been compliant with restrictions  No significant pain  Past Medical, Social and Family History:  Past Medical History:   Diagnosis Date   • Colon polyp    • GERD (gastroesophageal reflux disease)    • Hyperlipidemia    • Hypertension    • Sleep apnea      Past Surgical History:   Procedure Laterality Date   • BACK SURGERY  2009   • COLONOSCOPY       No Known Allergies  Current Outpatient Medications on File Prior to Visit   Medication Sig Dispense Refill   • acetaminophen (TYLENOL) 500 mg tablet Take 1 tablet (500 mg total) by mouth every 6 (six) hours as needed for mild pain 30 tablet 0   • amLODIPine (NORVASC) 10 mg tablet Take 1 tablet (10 mg total) by mouth daily 90 tablet 1   • atorvastatin (LIPITOR) 40 mg tablet Take 1 tablet (40 mg total) by mouth daily 90 tablet 1   • cyclobenzaprine (FLEXERIL) 10 mg tablet Take 1 tablet (10 mg total) by mouth 2 (two) times a day as needed for muscle spasms 20 tablet 0   • esomeprazole (NexIUM) 40 MG capsule Take one capsule (40mg) by mouth daily   90 capsule 0   • lidocaine (XYLOCAINE) 2 % topical gel Apply topically as needed for mild pain 30 mL 0   • losartan (COZAAR) 25 mg tablet Take 1 tablet (25 mg total) by mouth daily 90 tablet 1   • naproxen (EC NAPROSYN) 500 MG EC tablet Take 1 tablet (500 mg total) by mouth 2 (two) times a day with meals 20 tablet 0     No current facility-administered medications on file prior to visit  Social History     Socioeconomic History   • Marital status: Single     Spouse name: Not on file   • Number of children: Not on file   • Years of education: Not on file   • Highest education level: Not on file   Occupational History   • Not on file   Tobacco Use   • Smoking status: Former     Packs/day: 0 50     Types: Cigars, Cigarettes     Quit date: 2020     Years since quittin 3   • Smokeless tobacco: Never   Vaping Use   • Vaping Use: Never used   Substance and Sexual Activity   • Alcohol use: Yes     Comment: 2-3x/week 1 bottle of wine   • Drug use: Never   • Sexual activity: Yes     Partners: Female     Comment: same for 25 years   Other Topics Concern   • Not on file   Social History Narrative   • Not on file     Social Determinants of Health     Financial Resource Strain: Not on file   Food Insecurity: Not on file   Transportation Needs: No Transportation Needs   • Lack of Transportation (Medical): No   • Lack of Transportation (Non-Medical): No   Physical Activity: Not on file   Stress: Not on file   Social Connections: Not on file   Intimate Partner Violence: Not on file   Housing Stability: Not on file         I have reviewed the past medical, surgical, social and family history, medications and allergies as documented in the EMR  Review of systems: ROS is negative other than that noted in the HPI  Constitutional: Negative for fatigue and fever  HENT: Negative for sore throat  Respiratory: Negative for shortness of breath  Cardiovascular: Negative for chest pain  Gastrointestinal: Negative for abdominal pain  Endocrine: Negative for cold intolerance and heat intolerance  Genitourinary: Negative for flank pain  Musculoskeletal: Negative for back pain  Skin: Negative for rash  Allergic/Immunologic: Negative for immunocompromised state  Neurological: Negative for dizziness  Psychiatric/Behavioral: Negative for agitation        Physical Exam:    Height 5' 10" (1 778 m), weight 100 kg (221 lb)  General/Constitutional: NAD, well developed, well nourished  HENT: Normocephalic, atraumatic  CV: Intact distal pulses, regular rate  Resp: No respiratory distress or labored breathing  Lymphatic: No lymphadenopathy palpated  Neuro: Alert and Oriented x 3, no focal deficits  Psych: Normal mood, normal affect  Skin: Warm, dry, no rashes, no erythema      Left Ankle Exam:   Left ankle with no skin lesions  Achilles defect is filling in  No other areas of pain or tenderness  Neurovascular intact distally        Knee Imaging    No new imaging reviewed today

## 2022-12-16 ENCOUNTER — OFFICE VISIT (OUTPATIENT)
Dept: OBGYN CLINIC | Facility: CLINIC | Age: 57
End: 2022-12-16

## 2022-12-16 VITALS — BODY MASS INDEX: 31.64 KG/M2 | WEIGHT: 221 LBS | HEIGHT: 70 IN

## 2022-12-16 DIAGNOSIS — S86.012A ACHILLES TENDON RUPTURE, LEFT, INITIAL ENCOUNTER: Primary | ICD-10-CM

## 2022-12-16 NOTE — PROGRESS NOTES
Assessment/Plan:  1  Achilles tendon rupture, left, initial encounter      Orders Placed This Encounter   Procedures   • Ambulatory Referral to Physical Therapy     Transitioned from 3 to 2 heel wedges today  Continue WBAT  Achilles Rupture - Nonoperative Treatment Protocol     OVERVIEW:     Week 6: CAM walker boot, remove one wedge each week (so down to 2 wedges at week 6, 1 wedge at week 7, 0 wedges at week 8)   Start gentle active ankle range of motion (trace alphabet with toes, circles in both directions)   Week 8: start PT, transition to shoe with heel lift, wean off crutches   PT 2-3 times/week and home exercises daily   Progress with PT over ~4 months   Week 12: wean off of shoe lift   Week 20 / 5 months: gentle jog   Week 24 / 6 months:   Gradual return to activities as tolerated       Return in about 4 weeks (around 1/13/2023)  Subjective   Chief Complaint:   Chief Complaint   Patient presents with   • Left Ankle - Follow-up       Lionel Bales is a 61 y o  male who presents for 7 week follow up for ruptured left Achilles tendon  Patient denies further injuries, significant pain, and numbness/tingling  He wears the CAM boot whenever he is awake and is compliant with restrictions  He has been WBAT  Review of Systems  ROS:    See HPI for musculoskeletal review     All other systems reviewed are negative     History:  Past Medical History:   Diagnosis Date   • Colon polyp    • GERD (gastroesophageal reflux disease)    • Hyperlipidemia    • Hypertension    • Sleep apnea      Past Surgical History:   Procedure Laterality Date   • BACK SURGERY  2009   • COLONOSCOPY       Social History   Social History     Substance and Sexual Activity   Alcohol Use Yes    Comment: 2-3x/week 1 bottle of wine     Social History     Substance and Sexual Activity   Drug Use Never     Social History     Tobacco Use   Smoking Status Former   • Packs/day: 0 50   • Types: Cigars, Cigarettes   • Quit date: 7/28/2020   • Years since quittin 3   Smokeless Tobacco Never     Family History: No family history on file  Meds/Allergies   (Not in a hospital admission)    No Known Allergies       Objective     Ht 5' 10" (1 778 m)   Wt 100 kg (221 lb)   BMI 31 71 kg/m²      PE:  AAOx 3  Well nourished   no audible wheezing  no abdominal distension  LE compartments soft, skin intact    Ankle Exam:    Left ankle with no significant skin lesions  Achilles defect is filling in  Active dorsiflexion and plantarflexion  No other areas of pain or tenderness  Neurovascularly intact distally     Imaging  No imaging reviewed today

## 2023-01-10 ENCOUNTER — TELEPHONE (OUTPATIENT)
Dept: OBGYN CLINIC | Facility: HOSPITAL | Age: 58
End: 2023-01-10

## 2023-01-11 ENCOUNTER — TELEPHONE (OUTPATIENT)
Dept: OTHER | Facility: HOSPITAL | Age: 58
End: 2023-01-11

## 2023-01-11 ENCOUNTER — TELEPHONE (OUTPATIENT)
Dept: OBGYN CLINIC | Facility: CLINIC | Age: 58
End: 2023-01-11

## 2023-01-11 NOTE — TELEPHONE ENCOUNTER
Caller: Marcial Draper     Doctor: Pedro Marrero // [facility]     Reason for call: Patient called in tonight after his PT visit  Patient is requesting immediate surgery on his achilles tendon  Patient is aware of his appointment on Friday but  is very concerned and would like a phone call from Dr Pedro Marrero tomorrow, 01/11  Please reach out to him at number below       Call back#: [phone]

## 2023-01-12 NOTE — TELEPHONE ENCOUNTER
Called and spoke with the patient regarding his phone call  He is being seen and treated by a physical therapist at a practice outside [place] [address]  He explained that during therapy yesterday his physical therapist told him that he would not be able to perform a single-leg heel rise or return to any activities that required pushoff strength with his right foot and ankle  The patient was also concerned that he could not perform a single-leg heel rise with the therapist today  I explained that he is not at that point in the recovery process and I do not expect him to be able to do this so far  I again discussed the nonoperative treatment protocol that he chose to follow in a shared decision after discussion with me at our first meeting  I again reviewed data from the literature that shows a higher risk of retear as well as some persistent deficits in strength with nonoperative treatment  However, we discussed that there are lower risk of complication with nonoperative treatment as well, as we previously discussed  At our initial meeting he explained that he had gone through a surgery for similar injury on the contralateral side  At that time I provided all this information and he opted to proceed with nonoperative treatment  Today I reviewed the nonoperative treatment protocol which we are following  At this point he is just over 2 months from injury  I believe he has been making appropriate improvements in recovery since starting this treatment protocol  At this point in the protocol he has only just started physical therapy and I do not expect him to be able to perform a single-leg heel rise  I do expect him to continue having improvements in strength over his future sessions  He provided me with his physical therapist contact information  I will get in contact with the physical therapist to review the nonoperative treatment protocol      The patient expressed uncertainty with his treatment plan due to the discussion with the physical therapist   I recommend that he continue to hold his appointment on Friday and we will discuss further at that visit

## 2023-01-13 ENCOUNTER — OFFICE VISIT (OUTPATIENT)
Dept: OBGYN CLINIC | Facility: CLINIC | Age: 58
End: 2023-01-13

## 2023-01-13 VITALS
HEART RATE: 76 BPM | DIASTOLIC BLOOD PRESSURE: 123 MMHG | SYSTOLIC BLOOD PRESSURE: 199 MMHG | BODY MASS INDEX: 31.64 KG/M2 | WEIGHT: 221 LBS | HEIGHT: 70 IN

## 2023-01-13 DIAGNOSIS — S86.012A ACHILLES TENDON RUPTURE, LEFT, INITIAL ENCOUNTER: Primary | ICD-10-CM

## 2023-01-14 NOTE — PROGRESS NOTES
Assessment/Plan:  1  Achilles tendon rupture, left, initial encounter      Orders Placed This Encounter   Procedures   • Ambulatory Referral to Physical Therapy     We will continue with nonoperative treatment  Patient recently called with concerns regarding his nonoperative treatment after a visit with his physical therapist   The patient explained that this was because the physical therapist told him he would never be able to jog and never be able to perform a single-leg heel rise again  I informed him that I disagree with this assessment and that the data shows that people at his age and activity level are very likely to get back to their [employer] level of function and be satisfied with nonoperative treatment  Specifically we reviewed 1 study together that showed elite athletes return to sport at a rate of roughly 60 to 70%, however lower level athletes return to their recreational activities at a rate of 85 to 90%  Other studies do show that there is some loss of strength with both operative and nonoperative treatment, however patient's usually maintain and regain functional strength that would allow a single-leg heel rise  We again discussed that he is at a slightly higher risk of retear after nonoperative treatment  However the risks of other complications are lower with nonoperative treatment  Patient was happy with this discussion and understood and agreed with the plan to continue nonoperative treatment  A new referral for physical therapist with the [employer] system was provided with prefer nonoperative treatment protocol on that order          Achilles Rupture - Nonoperative Treatment Protocol     OVERVIEW:     Week 6: CAM walker boot, remove one wedge each week (so down to 2 wedges at week 6, 1 wedge at week 7, 0 wedges at week 8)   Start gentle active ankle range of motion (trace alphabet with toes, circles in both directions)   Week 8: start PT, transition to shoe with heel lift, wean off crutches   PT 2-3 times/week and home exercises daily   Progress with PT over ~4 months   Week 12: wean off of shoe lift   Week  months: gentle jog   Week  /  months:   Gradual return to activities as tolerated       Return in about 4 weeks (around 2/10/2023)  Subjective   Chief Complaint:   Chief Complaint   Patient presents with   • Left Achilles Tendon - Follow-up       Celester Runner is a 61 y o  male who presents for follow-up of nonoperatively treated Achilles with tear  He had some questions about nonoperative treatment after his most recent physical therapy visit  He has mild pain  Is doing well overall  Review of Systems  ROS:    See HPI for musculoskeletal review  All other systems reviewed are negative     History:  Past Medical History:   Diagnosis Date   • Colon polyp    • GERD (gastroesophageal reflux disease)    • Hyperlipidemia    • Hypertension    • Sleep apnea      Past Surgical History:   Procedure Laterality Date   • BACK SURGERY     • COLONOSCOPY       Social History   Social History     Substance and Sexual Activity   Alcohol Use Yes    Comment: 2-3x/week 1 bottle of wine     Social History     Substance and Sexual Activity   Drug Use Never     Social History     Tobacco Use   Smoking Status Former   • Packs/day: 0 50   • Types: Cigars, Cigarettes   • Quit date: 2020   • Years since quittin 4   Smokeless Tobacco Never     Family History: History reviewed  No pertinent family history  Meds/Allergies   (Not in a hospital admission)    No Known Allergies       Objective     BP (!) 199/123 (BP Location: Left arm, Patient Position: Sitting, Cuff Size: Large)   Pulse 76   Ht 5' 10" (1 778 m)   Wt 100 kg (221 lb)   BMI 31 71 kg/m²      PE:  AAOx 3  Well nourished   no audible wheezing  no abdominal distension  LE compartments soft, skin intact    Ankle Exam:    Left ankle with no significant skin lesions  Achilles defect tenuous to fill in    Active dorsiflexion and plantarflexion  No other areas of pain or tenderness  Neurovascularly intact distally     Imaging  No imaging reviewed today

## 2023-01-18 ENCOUNTER — EVALUATION (OUTPATIENT)
Dept: PHYSICAL THERAPY | Facility: MEDICAL CENTER | Age: 58
End: 2023-01-18

## 2023-01-18 DIAGNOSIS — S86.012A ACHILLES TENDON RUPTURE, LEFT, INITIAL ENCOUNTER: Primary | ICD-10-CM

## 2023-01-18 NOTE — PROGRESS NOTES
PT Evaluation     Today's date: 2023  Patient name: Priscilla Hinkle  : 1965  MRN: [id number]  Referring provider: Jil Garcia  Dx:   Encounter Diagnosis     ICD-10-CM    1  Achilles tendon rupture, left, initial encounter  S86 012A Ambulatory Referral to Physical Therapy                     Assessment  Assessment details: Priscilla Hinkle is a 61 y o  male presents with L Achilles Tendon rupture sustained on 10/28/22  Priscilla Hinkle has the below listed impairments and will benefit from skilled PT to improve deficits to return to prior level of function  Impairments: abnormal muscle firing, abnormal or restricted ROM, impaired physical strength and pain with function  Understanding of Dx/Px/POC: good   Prognosis: good    Goals  Impairment Goals  - Decrease pain to 0-3/10  - Improve ROM equal to contralateral side  - Increase strength equal to contralateral side    Functional Goals  - Patient will be independent with comprehensive HEP  - Ambulation is improved to prior level of function  - Stair climbing is improved to prior level of function  - Standing is improved to prior level of function      Plan  Patient would benefit from: skilled PT  Referral necessary: No  Planned therapy interventions: home exercise program, manual therapy, neuromuscular re-education, patient education, functional ROM exercises, strengthening, stretching and joint mobilization  Frequency: 2x week  Duration in weeks: 12  Treatment plan discussed with: patient        Subjective Evaluation    History of Present Illness  Mechanism of injury: Davey Reynolds presents with L Achilles Tendon rupture DOI: 10/28/22  Injury occurred while pushing a dumpster  He has been under the care of Dr Barry Cohen  Currently ambulating in sneaker without heel lift using a SPC  Denies paresthesias, cold sensation in foot, prior injury to L foot or ankle     Pain  At best pain rating: 3  At worst pain ratin    Patient Goals  Patient goals for therapy: decreased pain, increased motion and increased strength          Objective     Active Range of Motion   Left Ankle/Foot   Dorsiflexion (ke): 0 degrees   Plantar flexion: 35 degrees   Inversion: 20 degrees   Eversion: 10 degrees     Strength/Myotome Testing     Left Ankle/Foot   Dorsiflexion: 3+  Plantar flexion: 3  Inversion: 3+  Eversion: 3+             Precautions: Achilles Rupture - Nonoperative Treatment Protocol     Week 6: CAM walker boot, remove one wedge each week (so down to 2 wedges at week 6, 1 wedge at week 7, 0 wedges at week 8)   Start gentle active ankle range of motion (trace alphabet with toes, circles in both directions)   Week 8: start PT, transition to shoe with heel lift, wean off crutches   PT 2-3 times/week and home exercises daily   Progress with PT over ~4 months   Week 12: wean off of shoe lift   Week 20 / 5 months: gentle jog   Week 24 / 6 months: Gradual return to activities as tolerated       Manuals 1/18                                                                Neuro Re-Ed                                                                                                        Ther Ex             Ankle DF/PF 10            Ankle IN/EV 10            Ankle circles 10            alphabet             NWB HR 10            Ankle TB NV                                      Ther Activity                                       Gait Training                                       Modalities

## 2023-01-23 ENCOUNTER — OFFICE VISIT (OUTPATIENT)
Dept: PHYSICAL THERAPY | Facility: MEDICAL CENTER | Age: 58
End: 2023-01-23

## 2023-01-23 DIAGNOSIS — S86.012A ACHILLES TENDON RUPTURE, LEFT, INITIAL ENCOUNTER: Primary | ICD-10-CM

## 2023-01-23 NOTE — PROGRESS NOTES
Daily Note     Today's date: 2023  Patient name: Marcia Monsivais  : 1965  MRN: [id number]  Referring provider: Anil Montilla  Dx:   Encounter Diagnosis     ICD-10-CM    1  Achilles tendon rupture, left, initial encounter  S86 012A                      Subjective: Ema Hernandez reports he has been doing his exercises at home      Objective: See treatment diary below      Assessment: Tolerated treatment well  Patient demonstrated fatigue post treatment      Plan: Progress treament per protocol        Precautions: Achilles Rupture - Nonoperative Treatment Protocol     Week 6: CAM walker boot, remove one wedge each week (so down to 2 wedges at week 6, 1 wedge at week 7, 0 wedges at week 8)   Start gentle active ankle range of motion (trace alphabet with toes, circles in both directions)   Week 8: start PT, transition to shoe with heel lift, wean off crutches   PT 2-3 times/week and home exercises daily   Progress with PT over ~4 months   Week 12: wean off of shoe lift   Week 20 / 5 months: gentle jog   Week 24 / 6 months: Gradual return to activities as tolerated       Manuals            R ankle PROM  IN/Ev           R midfoot and forefoot mobs  Gr I JE                                     Neuro Re-Ed                                                                                                        Ther Ex             Ankle DF/PF 10 3x10           Ankle IN/EV 10 3x10           Ankle circles 10 3x10           alphabet             NWB HR 10 3x10           Ankle TB NV                         bike             Ther Activity                                       Gait Training                                       Modalities               10'

## 2023-01-25 ENCOUNTER — OFFICE VISIT (OUTPATIENT)
Dept: PHYSICAL THERAPY | Facility: MEDICAL CENTER | Age: 58
End: 2023-01-25

## 2023-01-25 DIAGNOSIS — S86.012A ACHILLES TENDON RUPTURE, LEFT, INITIAL ENCOUNTER: Primary | ICD-10-CM

## 2023-01-25 NOTE — PROGRESS NOTES
Daily Note     Today's date: 2023  Patient name: Fatou Dumont  : 1965  MRN: [id number]  Referring provider: Sushant Mcneill  Dx:   Encounter Diagnosis     ICD-10-CM    1  Achilles tendon rupture, left, initial encounter  S86 012A                      Subjective: Timothy Owen reports he feels like he is moving a little better      Objective: See treatment diary below      Assessment: Tolerated treatment well  Patient exhibited good technique with therapeutic exercises      Plan: Progress treatment as tolerated         Precautions: Achilles Rupture - Nonoperative Treatment Protocol     Week 6: CAM walker boot, remove one wedge each week (so down to 2 wedges at week 6, 1 wedge at week 7, 0 wedges at week 8)   Start gentle active ankle range of motion (trace alphabet with toes, circles in both directions)   Week 8: start PT, transition to shoe with heel lift, wean off crutches   PT 2-3 times/week and home exercises daily   Progress with PT over ~4 months   Week 12: wean off of shoe lift   Week  /  months: gentle jog   Week  /  months: Gradual return to activities as tolerated       Manuals           R ankle PROM  IN/Ev IN/EV          R midfoot and forefoot mobs  Gr I JE Gr I JE                                    Neuro Re-Ed                                                                                                        Ther Ex             Ankle DF/PF 10 3x10 3x10          Ankle IN/EV 10 3x10 3x10          Ankle circles 10 3x10 3x10          alphabet   1x          NWB HR 10 3x10 3x10          Ankle TB NV  Y 2x10 ea          Toe curls   3x10          bike             MRE's DF/PF, IN/EV   20 ea          Ther Activity                                       Gait Training                                       Modalities               10' 10'

## 2023-01-30 ENCOUNTER — OFFICE VISIT (OUTPATIENT)
Dept: PHYSICAL THERAPY | Facility: MEDICAL CENTER | Age: 58
End: 2023-01-30

## 2023-01-30 DIAGNOSIS — S86.012A ACHILLES TENDON RUPTURE, LEFT, INITIAL ENCOUNTER: Primary | ICD-10-CM

## 2023-01-30 NOTE — PROGRESS NOTES
Daily Note     Today's date: 2023  Patient name: Luly Khan  : 1965  MRN: [id number]  Referring provider: Ashley Matt  Dx:   Encounter Diagnosis     ICD-10-CM    1  Achilles tendon rupture, left, initial encounter  S86 012A                      Subjective: Melvin Valenzuela reports he continues to feel a little better  Objective: See treatment diary below      Assessment: Tolerated treatment well  Patient exhibited good technique with therapeutic exercises  He continues to fatigue with ankle TE  Ambulating with decreased toe off 2* dec strength  Plan: Progress treament per protocol        Precautions: Achilles Rupture - Nonoperative Treatment Protocol     Week 6: CAM walker boot, remove one wedge each week (so down to 2 wedges at week 6, 1 wedge at week 7, 0 wedges at week 8)   Start gentle active ankle range of motion (trace alphabet with toes, circles in both directions)   Week 8: start PT, transition to shoe with heel lift, wean off crutches   PT 2-3 times/week and home exercises daily   Progress with PT over ~4 months   Week 12: wean off of shoe lift   Week  /  months: gentle jog   Week  /  months: Gradual return to activities as tolerated       Manuals          R ankle PROM  IN/Ev IN/EV JE         R midfoot and forefoot mobs  Gr I JE Gr I JE JE                                   Neuro Re-Ed                                                                                                        Ther Ex             Ankle DF/PF 10 3x10 3x10 3x10         Ankle IN/EV 10 3x10 3x10 3x10         Ankle circles 10 3x10 3x10 3x10         alphabet   1x 1x         NWB HR 10 3x10 3x10          Ankle TB NV  Y 2x10 ea Y 3x10 ea, 4x10 PF         Toe curls   3x10 3x10         Recumbent bike    5'         MRE's DF/PF, IN/EV   20 ea 30ea         Ther Activity                                       Gait Training                                       Modalities              post tx  10' 10' 10'

## 2023-02-01 ENCOUNTER — OFFICE VISIT (OUTPATIENT)
Dept: PHYSICAL THERAPY | Facility: MEDICAL CENTER | Age: 58
End: 2023-02-01

## 2023-02-01 DIAGNOSIS — S86.012A ACHILLES TENDON RUPTURE, LEFT, INITIAL ENCOUNTER: Primary | ICD-10-CM

## 2023-02-01 NOTE — PROGRESS NOTES
Daily Note     Today's date: 2023  Patient name: Genaro Brown  : 1965  MRN: [id number]  Referring provider: Lynette Castillo  Dx:   Encounter Diagnosis     ICD-10-CM    1  Achilles tendon rupture, left, initial encounter  S86 012A                      Subjective: Placido Correa reports he continues to notice small improvements  Objective: See treatment diary below      Assessment: Tolerated treatment well  Patient demonstrated fatigue post treatment and exhibited good technique with therapeutic exercises      Plan: Progress treament per protocol  Inc sets of heel raises if able to next week        Precautions: Achilles Rupture - Nonoperative Treatment Protocol     Week 6: CAM walker boot, remove one wedge each week (so down to 2 wedges at week 6, 1 wedge at week 7, 0 wedges at week 8)   Start gentle active ankle range of motion (trace alphabet with toes, circles in both directions)   Week 8: start PT, transition to shoe with heel lift, wean off crutches   PT 2-3 times/week and home exercises daily   Progress with PT over ~4 months   Week 12: wean off of shoe lift   Week  months: gentle jog   Week  /  months: Gradual return to activities as tolerated       Manuals         R ankle PROM  IN/Ev IN/EV JE JE        R midfoot and forefoot mobs  Gr I JE Gr I JE JE NR        MRE's DF/PF, IN/EV,    20ea 30ea 30ea                     Neuro Re-Ed                                                                                                        Ther Ex             Ankle DF/PF 10 3x10 3x10 3x10 3x10        Ankle IN/EV 10 3x10 3x10 3x10 3x10        Ankle circles 10 3x10 3x10 3x10 3x10        alphabet   1x 1x 1x        NWB HR 10 3x10 3x10  3x10        Ankle TB NV  Y 2x10 ea Y 3x10 ea, 4x10 PF Y 3x10 ea        Toe curls   3x10 3x10 3x10        Recumbent bike    5' 6'        Heel raises     10        Ther Activity                                       Gait Training Modalities             Large MH post tx  10' 10' 10' 10'

## 2023-02-06 ENCOUNTER — OFFICE VISIT (OUTPATIENT)
Dept: PHYSICAL THERAPY | Facility: MEDICAL CENTER | Age: 58
End: 2023-02-06

## 2023-02-06 DIAGNOSIS — S86.012A ACHILLES TENDON RUPTURE, LEFT, INITIAL ENCOUNTER: Primary | ICD-10-CM

## 2023-02-06 NOTE — PROGRESS NOTES
Daily Note     Today's date: 2023  Patient name: Erick Thrasher  : 1965  MRN: [id number]  Referring provider: Jinny Smith  Dx:   Encounter Diagnosis     ICD-10-CM    1  Achilles tendon rupture, left, initial encounter  S86 012A                      Subjective: Pt believes he is getting closer to getting full weight bearing status with less difficulties  Pt reports he is starting to do more and feels good when he is on the bike  Objective: See treatment diary below      Assessment: Pt is currently doing well and seems to be able to progress his intervention/ strengthening program both by tolerance and current duration post injury  He is progressing as tolerated with minimal restrictions in ROM or functional strength noticed during PROM and MRE manual techniques  Plan: Continue per plan of care        Precautions: Achilles Rupture - Nonoperative Treatment Protocol     Week 6: CAM walker boot, remove one wedge each week (so down to 2 wedges at week 6, 1 wedge at week 7, 0 wedges at week 8)   Start gentle active ankle range of motion (trace alphabet with toes, circles in both directions)   Week 8: start PT, transition to shoe with heel lift, wean off crutches   PT 2-3 times/week and home exercises daily   Progress with PT over ~4 months   Week 12: wean off of shoe lift   Week 20 /  months: gentle jog   Week  /  months: Gradual return to activities as tolerated       Manuals        R ankle PROM  IN/Ev IN/EV JE JE MK       R midfoot and forefoot mobs  Gr I JE Gr I JE JE NR MK       MRE's DF/PF, IN/EV,    20ea 30ea 30ea 30x ea                    Neuro Re-Ed                                                                                                        Ther Ex             Ankle DF/PF 10 3x10 3x10 3x10 3x10 3x10       Ankle IN/EV 10 3x10 3x10 3x10 3x10 3x10       Ankle circles 10 3x10 3x10 3x10 3x10 3x10       alphabet   1x 1x 1x 1x       NWB HR 10 3x10 3x10  3x10        Ankle TB NV  Y 2x10 ea Y 3x10 ea, 4x10 PF Y 3x10 ea G 3x10       Toe curls   3x10 3x10 3x10 3x10 w/ towel       Recumbent bike    5' 6' 6'       Heel raises     10 3x10       Ther Activity                                       Gait Training                                       Modalities             Large MH post tx  10' 10' 10' 10' 10'

## 2023-02-08 ENCOUNTER — OFFICE VISIT (OUTPATIENT)
Dept: PHYSICAL THERAPY | Facility: MEDICAL CENTER | Age: 58
End: 2023-02-08

## 2023-02-08 DIAGNOSIS — S86.012A ACHILLES TENDON RUPTURE, LEFT, INITIAL ENCOUNTER: Primary | ICD-10-CM

## 2023-02-08 NOTE — PROGRESS NOTES
Daily Note     Today's date: 2023  Patient name: Bryant Lopez  : 1965  MRN: [id number]  Referring provider: Deshaun Strong  Dx:   Encounter Diagnosis     ICD-10-CM    1  Achilles tendon rupture, left, initial encounter  S86 012A                      Subjective: Pt believes he is getting closer to getting full weight bearing status with less difficulties  Pt reports he is starting to do more and feels good when he is on the bike  Objective: See treatment diary below      Assessment: Pt is currently doing well and seems to be able to progress his intervention/ strengthening program both by tolerance and current duration post injury  He is progressing as tolerated with minimal restrictions in ROM or functional strength noticed during PROM and MRE manual techniques  Plan: Continue per plan of care        Precautions: Achilles Rupture - Nonoperative Treatment Protocol     Week 6: CAM walker boot, remove one wedge each week (so down to 2 wedges at week 6, 1 wedge at week 7, 0 wedges at week 8)   Start gentle active ankle range of motion (trace alphabet with toes, circles in both directions)   Week 8: start PT, transition to shoe with heel lift, wean off crutches   PT 2-3 times/week and home exercises daily   Progress with PT over ~4 months   Week 12: wean off of shoe lift   Week  /  months: gentle jog   Week  /  months: Gradual return to activities as tolerated       Manuals       R ankle PROM  IN/Ev IN/EV JE JE MK AF      R midfoot and forefoot mobs  Gr I JE Gr I JE JE NR MK AF      MRE's DF/PF, IN/EV,    20ea 30ea 30ea 30x ea 30ea                   Neuro Re-Ed                                                                                                        Ther Ex             Ankle DF/PF 10 3x10 3x10 3x10 3x10 3x10 3x10      Ankle IN/EV 10 3x10 3x10 3x10 3x10 3x10 3x10      Ankle circles 10 3x10 3x10 3x10 3x10 3x10 3x10      alphabet   1x 1x 1x 1x 1X      NWB HR 10 3x10 3x10  3x10        Ankle TB NV  Y 2x10 ea Y 3x10 ea, 4x10 PF Y 3x10 ea G 3x10 G 3x10      Toe curls   3x10 3x10 3x10 3x10 w/ towel 3x10      Recumbent bike    5' 6' 6' 7'      Heel raises     10 3x10 3x10       Ther Activity                                       Gait Training                                       Modalities             Large MH post tx  10' 10' 10' 10' 10'

## 2023-02-10 ENCOUNTER — OFFICE VISIT (OUTPATIENT)
Dept: OBGYN CLINIC | Facility: CLINIC | Age: 58
End: 2023-02-10

## 2023-02-10 VITALS
BODY MASS INDEX: 32.35 KG/M2 | WEIGHT: 226 LBS | DIASTOLIC BLOOD PRESSURE: 75 MMHG | HEIGHT: 70 IN | SYSTOLIC BLOOD PRESSURE: 120 MMHG

## 2023-02-10 DIAGNOSIS — S86.012A ACHILLES TENDON RUPTURE, LEFT, INITIAL ENCOUNTER: Primary | ICD-10-CM

## 2023-02-13 ENCOUNTER — OFFICE VISIT (OUTPATIENT)
Dept: PHYSICAL THERAPY | Facility: MEDICAL CENTER | Age: 58
End: 2023-02-13

## 2023-02-13 DIAGNOSIS — S86.012A ACHILLES TENDON RUPTURE, LEFT, INITIAL ENCOUNTER: Primary | ICD-10-CM

## 2023-02-13 NOTE — PROGRESS NOTES
Daily Note     Today's date: 2023  Patient name: Brenda Sierra  : 1965  MRN: [id number]  Referring provider: Janine Avila  Dx:   Encounter Diagnosis     ICD-10-CM    1  Achilles tendon rupture, left, initial encounter  S86 012A                      Subjective: Anjali Brumfield reports he continues to feel better      Objective: See treatment diary below      Assessment: Tolerated treatment well  Patient exhibited good technique with therapeutic exercises      Plan: Progress treament per protocol        Precautions: Achilles Rupture - Nonoperative Treatment Protocol     Week 6: CAM walker boot, remove one wedge each week (so down to 2 wedges at week 6, 1 wedge at week 7, 0 wedges at week 8)   Start gentle active ankle range of motion (trace alphabet with toes, circles in both directions)   Week 8: start PT, transition to shoe with heel lift, wean off crutches   PT 2-3 times/week and home exercises daily   Progress with PT over ~4 months   Week 12: wean off of shoe lift   Week  months: gentle jog   Week  months: Gradual return to activities as tolerated       Manuals      R ankle PROM  IN/Ev IN/EV JE JE MK AF JE     R midfoot and forefoot mobs  Gr I JE Gr I JE JE NR MK AF JE     MRE's DF/PF, IN/EV,    20ea 30ea 30ea 30x ea 30ea 30ea                  Neuro Re-Ed                                                                                                        Ther Ex             Ankle DF/PF 10 3x10 3x10 3x10 3x10 3x10 3x10      Ankle IN/EV 10 3x10 3x10 3x10 3x10 3x10 3x10      Ankle circles 10 3x10 3x10 3x10 3x10 3x10 3x10      alphabet   1x 1x 1x 1x 1X      NWB HR 10 3x10 3x10  3x10        Ankle TB NV  Y 2x10 ea Y 3x10 ea, 4x10 PF Y 3x10 ea G 3x10 G 3x10 Blue 3x10     Toe curls   3x10 3x10 3x10 3x10 w/ towel 3x10 3x10     Recumbent bike    5' 6' 6' 7' 10'     Heel raises     10 3x10 3x10  3x10     Ther Activity Gait Training                                       Modalities             Large MH post tx  10' 10' 10' 10' 10'

## 2023-02-13 NOTE — PROGRESS NOTES
Assessment/Plan:  1  Achilles tendon rupture, left, initial encounter      Continue nonoperative treatment protocol  Patient has made good progress so far  We reviewed the expected recovery time again  Follow up in 6 weeks  Achilles Rupture - Nonoperative Treatment Protocol     OVERVIEW:     Week 6: CAM walker boot, remove one wedge each week (so down to 2 wedges at week 6, 1 wedge at week 7, 0 wedges at week 8)   Start gentle active ankle range of motion (trace alphabet with toes, circles in both directions)   Week 8: start PT, transition to shoe with heel lift, wean off crutches   PT 2-3 times/week and home exercises daily   Progress with PT over ~4 months   Week 12: wean off of shoe lift   Week 20 /  months: gentle jog   Week 24 /  months:   Gradual return to activities as tolerated       No follow-ups on file  Subjective   Chief Complaint:   Chief Complaint   Patient presents with   • Left Achilles Tendon - Follow-up       Juliane Bryson is a 61 y o  male who presents for follow-up of nonoperatively treated Achilles with tear  He had some questions about nonoperative treatment after his most recent physical therapy visit  He has mild pain  Is doing well overall  Review of Systems  ROS:    See HPI for musculoskeletal review     All other systems reviewed are negative     History:  Past Medical History:   Diagnosis Date   • Colon polyp    • GERD (gastroesophageal reflux disease)    • Hyperlipidemia    • Hypertension    • Sleep apnea      Past Surgical History:   Procedure Laterality Date   • BACK SURGERY     • COLONOSCOPY       Social History   Social History     Substance and Sexual Activity   Alcohol Use Yes    Comment: 2-3x/week 1 bottle of wine     Social History     Substance and Sexual Activity   Drug Use Never     Social History     Tobacco Use   Smoking Status Former   • Packs/day: 0 50   • Types: Cigars, Cigarettes   • Quit date: 2020   • Years since quittin 5   Smokeless Tobacco Never     Family History: History reviewed  No pertinent family history  Meds/Allergies   (Not in a hospital admission)    No Known Allergies       Objective     /75 (BP Location: Left arm, Patient Position: Sitting, Cuff Size: Adult)   Ht 5' 10" (1 778 m)   Wt 103 kg (226 lb)   BMI 32 43 kg/m²      PE:  AAOx 3  Well nourished   no audible wheezing  no abdominal distension  LE compartments soft, skin intact    Ankle Exam:    Left ankle with no significant skin lesions  Achilles defect tenuous to fill in  Active dorsiflexion and plantarflexion  No other areas of pain or tenderness  Neurovascularly intact distally     Imaging  No imaging reviewed today

## 2023-02-15 ENCOUNTER — APPOINTMENT (OUTPATIENT)
Dept: PHYSICAL THERAPY | Facility: MEDICAL CENTER | Age: 58
End: 2023-02-15

## 2023-02-20 ENCOUNTER — OFFICE VISIT (OUTPATIENT)
Dept: PHYSICAL THERAPY | Facility: MEDICAL CENTER | Age: 58
End: 2023-02-20

## 2023-02-20 DIAGNOSIS — S86.012A ACHILLES TENDON RUPTURE, LEFT, INITIAL ENCOUNTER: Primary | ICD-10-CM

## 2023-02-20 NOTE — PROGRESS NOTES
Daily Note     Today's date: 2023  Patient name: Mansi Jones  : 1965  MRN: [id number]  Referring provider: Lizeth Spence  Dx:   Encounter Diagnosis     ICD-10-CM    1  Achilles tendon rupture, left, initial encounter  [id number]                      Subjective: [place] Basilio reports he continues to feel stronger      Objective: See treatment diary below      Assessment: Tolerated treatment well  Patient exhibited good technique with therapeutic exercises      Plan: Progress treament per protocol        Precautions: Achilles Rupture - Nonoperative Treatment Protocol     Week 6: CAM walker boot, remove one wedge each week (so down to 2 wedges at week 6, 1 wedge at week 7, 0 wedges at week 8)   Start gentle active ankle range of motion (trace alphabet with toes, circles in both directions)   Week 8: start PT, transition to shoe with heel lift, wean off crutches   PT 2-3 times/week and home exercises daily   Progress with PT over ~4 months   Week 12: wean off of shoe lift   Week  months: gentle jog   Week  months: Gradual return to activities as tolerated       Manuals     R ankle PROM  IN/Ev IN/EV JE JE MK AF JE JE    R midfoot and forefoot mobs  Gr I JE Gr I JE JE NR MK AF JE JE    MRE's DF/PF, IN/EV,    20ea 30ea 30ea 30x ea 30ea 30ea 30ea                 Neuro Re-Ed                                                                                                        Ther Ex             Ankle DF/PF 10 3x10 3x10 3x10 3x10 3x10 3x10      Ankle IN/EV 10 3x10 3x10 3x10 3x10 3x10 3x10      Ankle circles 10 3x10 3x10 3x10 3x10 3x10 3x10      alphabet   1x 1x 1x 1x 1X      NWB HR 10 3x10 3x10  3x10        Ankle TB NV  Y 2x10 ea Y 3x10 ea, 4x10 PF Y 3x10 ea G 3x10 G 3x10 Blue 3x10 Blue 3x10    Toe curls   3x10 3x10 3x10 3x10 w/ towel 3x10 3x10 3x10    Recumbent bike    5' 6' 6' 7' 10' 10'    Heel raises     10 3x10 3x10  3x10 3x10    Ther Activity Gait Training                                       Modalities             Large MH post tx  10' 10' 10' 10' 10'       ice         10'

## 2023-02-22 ENCOUNTER — APPOINTMENT (OUTPATIENT)
Dept: PHYSICAL THERAPY | Facility: MEDICAL CENTER | Age: 58
End: 2023-02-22

## 2023-02-23 ENCOUNTER — VBI (OUTPATIENT)
Dept: ADMINISTRATIVE | Facility: OTHER | Age: 58
End: 2023-02-23

## 2023-04-21 DIAGNOSIS — I10 ESSENTIAL HYPERTENSION: ICD-10-CM

## 2023-04-22 DIAGNOSIS — I10 ESSENTIAL HYPERTENSION: ICD-10-CM

## 2023-04-22 DIAGNOSIS — K21.9 GASTROESOPHAGEAL REFLUX DISEASE, UNSPECIFIED WHETHER ESOPHAGITIS PRESENT: ICD-10-CM

## 2023-04-22 NOTE — TELEPHONE ENCOUNTER
Medication Refill Request     Name losartan (COZAAR) 25 mg tablet  Dose/Frequency Take 1 tablet (25 mg total) by mouth daily  Quantity 90  Verified pharmacy   [x]  Verified ordering Provider   [x]  Does patient have enough for the next 3 days? Yes [x] No []    Medication Refill Request     Name esomeprazole (NexIUM) 40 MG capsule  Dose/Frequency TAKE ONE CAPSULE (40MG) BY MOUTH DAILY  Quantity 90  Verified pharmacy   [x]  Verified ordering Provider   [x]  Does patient have enough for the next 3 days? Yes [x] No []    Medication Refill Request     Name amLODIPine (NORVASC) 10 mg tablet  Dose/Frequency Take 1 tablet (10 mg total) by mouth daily  Quantity 90  Verified pharmacy   [x]  Verified ordering Provider   [x]  Does patient have enough for the next 3 days?  Yes [x] No []

## 2023-04-23 RX ORDER — AMLODIPINE BESYLATE 10 MG/1
10 TABLET ORAL DAILY
Qty: 90 TABLET | Refills: 1 | Status: SHIPPED | OUTPATIENT
Start: 2023-04-23

## 2023-04-23 RX ORDER — LOSARTAN POTASSIUM 25 MG/1
25 TABLET ORAL DAILY
Qty: 90 TABLET | Refills: 1 | Status: SHIPPED | OUTPATIENT
Start: 2023-04-23 | End: 2023-07-22

## 2023-04-23 RX ORDER — ESOMEPRAZOLE MAGNESIUM 40 MG/1
40 CAPSULE, DELAYED RELEASE ORAL DAILY
Qty: 90 CAPSULE | Refills: 1 | Status: SHIPPED | OUTPATIENT
Start: 2023-04-23 | End: 2023-07-22

## 2023-04-23 RX ORDER — AMLODIPINE BESYLATE 10 MG/1
10 TABLET ORAL DAILY
Qty: 90 TABLET | Refills: 1 | OUTPATIENT
Start: 2023-04-23 | End: 2023-07-22

## 2023-06-28 ENCOUNTER — APPOINTMENT (EMERGENCY)
Dept: CT IMAGING | Facility: HOSPITAL | Age: 58
End: 2023-06-28
Payer: COMMERCIAL

## 2023-06-28 ENCOUNTER — HOSPITAL ENCOUNTER (EMERGENCY)
Facility: HOSPITAL | Age: 58
Discharge: HOME | End: 2023-06-28
Attending: EMERGENCY MEDICINE
Payer: COMMERCIAL

## 2023-06-28 VITALS
HEART RATE: 82 BPM | OXYGEN SATURATION: 97 % | RESPIRATION RATE: 18 BRPM | SYSTOLIC BLOOD PRESSURE: 102 MMHG | DIASTOLIC BLOOD PRESSURE: 59 MMHG | TEMPERATURE: 98.9 F

## 2023-06-28 DIAGNOSIS — J03.90 TONSILLITIS: Primary | ICD-10-CM

## 2023-06-28 LAB
ALBUMIN SERPL BCP-MCNC: 4.5 G/DL (ref 3.5–5)
ALP SERPL-CCNC: 63 U/L (ref 34–104)
ALT SERPL W P-5'-P-CCNC: 18 U/L (ref 7–52)
ANION GAP SERPL CALCULATED.3IONS-SCNC: 13 MMOL/L
APTT PPP: 31 SECONDS (ref 23–37)
AST SERPL W P-5'-P-CCNC: 13 U/L (ref 13–39)
ATRIAL RATE: 101 BPM
BASOPHILS # BLD MANUAL: 0 THOUSAND/UL (ref 0–0.1)
BASOPHILS NFR MAR MANUAL: 0 % (ref 0–1)
BILIRUB SERPL-MCNC: 0.71 MG/DL (ref 0.2–1)
BUN SERPL-MCNC: 7 MG/DL (ref 5–25)
CALCIUM SERPL-MCNC: 9.3 MG/DL (ref 8.4–10.2)
CHLORIDE SERPL-SCNC: 99 MMOL/L (ref 96–108)
CO2 SERPL-SCNC: 23 MMOL/L (ref 21–32)
CREAT SERPL-MCNC: 0.68 MG/DL (ref 0.6–1.3)
EOSINOPHIL # BLD MANUAL: 0.22 THOUSAND/UL (ref 0–0.4)
EOSINOPHIL NFR BLD MANUAL: 1 % (ref 0–6)
ERYTHROCYTE [DISTWIDTH] IN BLOOD BY AUTOMATED COUNT: 14.8 % (ref 11.6–15.1)
GFR SERPL CREATININE-BSD FRML MDRD: 104 ML/MIN/1.73SQ M
GLUCOSE SERPL-MCNC: 119 MG/DL (ref 65–140)
HCT VFR BLD AUTO: 46.1 % (ref 36.5–49.3)
HGB BLD-MCNC: 15.2 G/DL (ref 12–17)
INR PPP: 0.96 (ref 0.84–1.19)
LACTATE SERPL-SCNC: 1.4 MMOL/L (ref 0.5–2)
LYMPHOCYTES # BLD AUTO: 1.11 THOUSAND/UL (ref 0.6–4.47)
LYMPHOCYTES # BLD AUTO: 5 % (ref 14–44)
MCH RBC QN AUTO: 26.2 PG (ref 26.8–34.3)
MCHC RBC AUTO-ENTMCNC: 33 G/DL (ref 31.4–37.4)
MCV RBC AUTO: 80 FL (ref 82–98)
MONOCYTES # BLD AUTO: 1.33 THOUSAND/UL (ref 0–1.22)
MONOCYTES NFR BLD: 6 % (ref 4–12)
NEUTROPHILS # BLD MANUAL: 19.51 THOUSAND/UL (ref 1.85–7.62)
NEUTS BAND NFR BLD MANUAL: 8 % (ref 0–8)
NEUTS SEG NFR BLD AUTO: 80 % (ref 43–75)
P AXIS: 51 DEGREES
PLATELET # BLD AUTO: 267 THOUSANDS/UL (ref 149–390)
PLATELET BLD QL SMEAR: ADEQUATE
PMV BLD AUTO: 9.9 FL (ref 8.9–12.7)
POTASSIUM SERPL-SCNC: 3.5 MMOL/L (ref 3.5–5.3)
PR INTERVAL: 134 MS
PROCALCITONIN SERPL-MCNC: 0.11 NG/ML
PROT SERPL-MCNC: 7.6 G/DL (ref 6.4–8.4)
PROTHROMBIN TIME: 13 SECONDS (ref 11.6–14.5)
QRS AXIS: -49 DEGREES
QRSD INTERVAL: 108 MS
QT INTERVAL: 358 MS
QTC INTERVAL: 464 MS
RBC # BLD AUTO: 5.8 MILLION/UL (ref 3.88–5.62)
RBC MORPH BLD: NORMAL
S PYO DNA THROAT QL NAA+PROBE: DETECTED
SODIUM SERPL-SCNC: 135 MMOL/L (ref 135–147)
T WAVE AXIS: 106 DEGREES
VENTRICULAR RATE: 101 BPM
WBC # BLD AUTO: 22.17 THOUSAND/UL (ref 4.31–10.16)

## 2023-06-28 PROCEDURE — 85730 THROMBOPLASTIN TIME PARTIAL: CPT

## 2023-06-28 PROCEDURE — 70491 CT SOFT TISSUE NECK W/DYE: CPT

## 2023-06-28 PROCEDURE — 85610 PROTHROMBIN TIME: CPT

## 2023-06-28 PROCEDURE — 36415 COLL VENOUS BLD VENIPUNCTURE: CPT

## 2023-06-28 PROCEDURE — 83605 ASSAY OF LACTIC ACID: CPT

## 2023-06-28 PROCEDURE — 87651 STREP A DNA AMP PROBE: CPT

## 2023-06-28 PROCEDURE — 85027 COMPLETE CBC AUTOMATED: CPT

## 2023-06-28 PROCEDURE — 87040 BLOOD CULTURE FOR BACTERIA: CPT

## 2023-06-28 PROCEDURE — 80053 COMPREHEN METABOLIC PANEL: CPT

## 2023-06-28 PROCEDURE — 93010 ELECTROCARDIOGRAM REPORT: CPT | Performed by: INTERNAL MEDICINE

## 2023-06-28 PROCEDURE — G1004 CDSM NDSC: HCPCS

## 2023-06-28 PROCEDURE — 84145 PROCALCITONIN (PCT): CPT

## 2023-06-28 PROCEDURE — 85007 BL SMEAR W/DIFF WBC COUNT: CPT

## 2023-06-28 PROCEDURE — 93005 ELECTROCARDIOGRAM TRACING: CPT

## 2023-06-28 RX ORDER — DEXAMETHASONE SODIUM PHOSPHATE 10 MG/ML
10 INJECTION, SOLUTION INTRAMUSCULAR; INTRAVENOUS ONCE
Status: COMPLETED | OUTPATIENT
Start: 2023-06-28 | End: 2023-06-28

## 2023-06-28 RX ORDER — AMPICILLIN AND SULBACTAM 1; .5 G/1; G/1
INJECTION, POWDER, FOR SOLUTION INTRAMUSCULAR; INTRAVENOUS
Status: COMPLETED
Start: 2023-06-28 | End: 2023-06-28

## 2023-06-28 RX ORDER — KETOROLAC TROMETHAMINE 30 MG/ML
15 INJECTION, SOLUTION INTRAMUSCULAR; INTRAVENOUS ONCE
Status: COMPLETED | OUTPATIENT
Start: 2023-06-28 | End: 2023-06-28

## 2023-06-28 RX ORDER — AMOXICILLIN AND CLAVULANATE POTASSIUM 875; 125 MG/1; MG/1
1 TABLET, FILM COATED ORAL EVERY 12 HOURS SCHEDULED
Qty: 18 TABLET | Refills: 0 | Status: SHIPPED | OUTPATIENT
Start: 2023-06-28 | End: 2023-07-07

## 2023-06-28 RX ADMIN — IOHEXOL 85 ML: 350 INJECTION, SOLUTION INTRAVENOUS at 05:38

## 2023-06-28 RX ADMIN — AMPICILLIN SODIUM AND SULBACTAM SODIUM 3000 MG: 1; .5 INJECTION, POWDER, FOR SOLUTION INTRAMUSCULAR; INTRAVENOUS at 06:02

## 2023-06-28 RX ADMIN — SODIUM CHLORIDE 1000 ML: 0.9 INJECTION, SOLUTION INTRAVENOUS at 04:39

## 2023-06-28 RX ADMIN — AMPICILLIN AND SULBACTAM 3 G: 2; 1 INJECTION, POWDER, FOR SOLUTION INTRAMUSCULAR; INTRAVENOUS at 06:02

## 2023-06-28 RX ADMIN — DEXAMETHASONE SODIUM PHOSPHATE 10 MG: 10 INJECTION, SOLUTION INTRAMUSCULAR; INTRAVENOUS at 06:24

## 2023-06-28 RX ADMIN — KETOROLAC TROMETHAMINE 15 MG: 30 INJECTION, SOLUTION INTRAMUSCULAR; INTRAVENOUS at 04:39

## 2023-06-28 NOTE — ED PROVIDER NOTES
History  Chief Complaint   Patient presents with   • Sore Throat - Complicated     Throat irritation, swelling, difficulty swallowing that started yesterday  No meds pta  Lola Cervantes is a 60year old male presenting for evaluation of sore throat, difficulty swallowing, fevers, and change in voice  Patient began yesterday with a sore throat, over the last 12 hours he has had progressive difficulty and significant pain with swallowing  Patient last ate around 3 or 4 PM yesterday, he was able to tolerate water throughout the evening, however he has not attempted to eat or drink anything since he awoke prior to coming to the emergency department  He did not take any medication prior to arrival   No difficulty breathing  He is able tolerate his own saliva  No nausea or vomiting  No known sick contacts  History provided by:  Patient   used: No        Prior to Admission Medications   Prescriptions Last Dose Informant Patient Reported?  Taking?   acetaminophen (TYLENOL) 500 mg tablet   No No   Sig: Take 1 tablet (500 mg total) by mouth every 6 (six) hours as needed for mild pain   amLODIPine (NORVASC) 10 mg tablet   No No   Sig: Take 1 tablet (10 mg total) by mouth daily   atorvastatin (LIPITOR) 40 mg tablet   No No   Sig: Take 1 tablet (40 mg total) by mouth daily   cyclobenzaprine (FLEXERIL) 10 mg tablet   No No   Sig: Take 1 tablet (10 mg total) by mouth 2 (two) times a day as needed for muscle spasms   esomeprazole (NexIUM) 40 MG capsule   No No   Sig: Take 1 capsule (40 mg total) by mouth in the morning   lidocaine (XYLOCAINE) 2 % topical gel   No No   Sig: Apply topically as needed for mild pain   losartan (COZAAR) 25 mg tablet   No No   Sig: Take 1 tablet (25 mg total) by mouth daily   naproxen (EC NAPROSYN) 500 MG EC tablet   No No   Sig: Take 1 tablet (500 mg total) by mouth 2 (two) times a day with meals      Facility-Administered Medications: None       Past Medical History: Diagnosis Date   • Colon polyp    • GERD (gastroesophageal reflux disease)    • Hyperlipidemia    • Hypertension    • Sleep apnea        Past Surgical History:   Procedure Laterality Date   • BACK SURGERY     • COLONOSCOPY         History reviewed  No pertinent family history  I have reviewed and agree with the history as documented  E-Cigarette/Vaping   • E-Cigarette Use Never User      E-Cigarette/Vaping Substances   • Nicotine No    • THC No    • CBD No    • Flavoring No    • Other No    • Unknown No      Social History     Tobacco Use   • Smoking status: Former     Packs/day: 0 50     Types: Cigars, Cigarettes     Quit date: 2020     Years since quittin 9   • Smokeless tobacco: Never   Vaping Use   • Vaping Use: Never used   Substance Use Topics   • Alcohol use: Yes     Comment: 2-3x/week 1 bottle of wine   • Drug use: Never        Review of Systems   Constitutional: Positive for fever  Negative for chills  HENT: Positive for sore throat, trouble swallowing and voice change  Negative for ear pain  Eyes: Negative for pain and visual disturbance  Respiratory: Negative for cough and shortness of breath  Cardiovascular: Negative for chest pain and palpitations  Gastrointestinal: Negative for abdominal pain and vomiting  Genitourinary: Negative for dysuria and hematuria  Musculoskeletal: Negative for arthralgias and back pain  Skin: Negative for color change and rash  Neurological: Negative for seizures and syncope  All other systems reviewed and are negative        Physical Exam  ED Triage Vitals   Temperature Pulse Respirations Blood Pressure SpO2   23 0357 23 0357 23 0357 23 0357 23 0357   100 5 °F (38 1 °C) (!) 116 20 102/59 97 %      Temp Source Heart Rate Source Patient Position - Orthostatic VS BP Location FiO2 (%)   23 0357 23 0357 23 0357 23 0357 --   Tympanic Monitor Sitting Left arm       Pain Score       23 0439       7             Orthostatic Vital Signs  Vitals:    06/28/23 0357 06/28/23 0439   BP: 102/59    Pulse: (!) 116 96   Patient Position - Orthostatic VS: Sitting        Physical Exam  Vitals and nursing note reviewed  Constitutional:       Appearance: He is ill-appearing  HENT:      Head: Normocephalic and atraumatic  Comments: Muffled voice, tolerating secretions however     Right Ear: External ear normal       Left Ear: External ear normal       Mouth/Throat:      Mouth: Mucous membranes are moist  No oral lesions or angioedema  Dentition: Normal dentition  No dental tenderness or dental abscesses  Tongue: No lesions  Pharynx: Posterior oropharyngeal erythema present  No oropharyngeal exudate  Tonsils: No tonsillar exudate or tonsillar abscesses  Comments: Swelling of the right soft palate concerning for peritonsillar abscess  Eyes:      General: No scleral icterus  Conjunctiva/sclera: Conjunctivae normal    Cardiovascular:      Rate and Rhythm: Regular rhythm  Tachycardia present  Pulses: Normal pulses  Heart sounds: Normal heart sounds  Pulmonary:      Effort: Pulmonary effort is normal  No respiratory distress  Breath sounds: Normal breath sounds  No wheezing or rales  Abdominal:      General: Abdomen is flat  There is no distension  Tenderness: There is no abdominal tenderness  Musculoskeletal:         General: No tenderness or signs of injury  Cervical back: Neck supple  No rigidity  Skin:     General: Skin is warm  Coloration: Skin is not jaundiced  Findings: No erythema or rash  Neurological:      General: No focal deficit present  Mental Status: He is alert  Mental status is at baseline     Psychiatric:         Mood and Affect: Mood normal          Behavior: Behavior normal          ED Medications  Medications   sodium chloride 0 9 % bolus 1,000 mL (0 mL Intravenous Stopped 6/28/23 0626)   ketorolac (TORADOL) injection 15 mg (15 mg Intravenous Given 6/28/23 0439)   ampicillin-sulbactam (UNASYN) 3 g in sodium chloride 0 9 % 100 mL IVPB (0 g Intravenous Stopped 6/28/23 0624)   iohexol (OMNIPAQUE) 350 MG/ML injection (SINGLE-DOSE) 85 mL (85 mL Intravenous Given 6/28/23 0538)   ampicillin-sulbactam (UNASYN) 1 5 g injection **ADS Override Pull** (3,000 mg  Given 6/28/23 0602)   dexamethasone (PF) (DECADRON) injection 10 mg (10 mg Intravenous Given 6/28/23 0624)       Diagnostic Studies  Results Reviewed     Procedure Component Value Units Date/Time    Manual Differential(PHLEBS Do Not Order) [[id number]]  (Abnormal) Collected: 06/28/23 0429    Lab Status: Final result Specimen: Blood from Arm, Right Updated: 06/28/23 0530     Segmented % 80 %      Bands % 8 %      Lymphocytes % 5 %      Monocytes % 6 %      Eosinophils, % 1 %      Basophils % 0 %      Absolute Neutrophils 19 51 Thousand/uL      Lymphocytes Absolute 1 11 Thousand/uL      Monocytes Absolute 1 33 Thousand/uL      Eosinophils Absolute 0 22 Thousand/uL      Basophils Absolute 0 00 Thousand/uL      Total Counted --     RBC Morphology Normal     Platelet Estimate Adequate    Lactic acid [[id number]]  (Normal) Collected: 06/28/23 0429    Lab Status: Final result Specimen: Blood from Arm, Right Updated: 06/28/23 0525     LACTIC ACID 1 4 mmol/L     Narrative:      Result may be elevated if tourniquet was used during collection      Procalcitonin [[id number]]  (Normal) Collected: 06/28/23 0429    Lab Status: Final result Specimen: Blood from Arm, Right Updated: 06/28/23 0525     Procalcitonin 0 11 ng/ml     Strep A PCR [[id number]]  (Abnormal) Collected: 06/28/23 0429    Lab Status: Final result Specimen: Throat Updated: 06/28/23 0519     STREP A PCR Detected    Comprehensive metabolic panel [[id number]] Collected: 06/28/23 0429    Lab Status: Final result Specimen: Blood from Arm, Right Updated: 06/28/23 0517     Sodium 135 mmol/L      Potassium 3 5 mmol/L      Chloride 99 mmol/L      CO2 23 mmol/L      ANION GAP 13 mmol/L      BUN 7 mg/dL      Creatinine 0 68 mg/dL      Glucose 119 mg/dL      Calcium 9 3 mg/dL      AST 13 U/L      ALT 18 U/L      Alkaline Phosphatase 63 U/L      Total Protein 7 6 g/dL      Albumin 4 5 g/dL      Total Bilirubin 0 71 mg/dL      eGFR 104 ml/min/1 73sq m     Narrative:      [place] guidelines for Chronic Kidney Disease (CKD):   •  Stage 1 with normal or high GFR (GFR > 90 mL/min/1 73 square meters)  •  Stage 2 Mild CKD (GFR = 60-89 mL/min/1 73 square meters)  •  Stage 3A Moderate CKD (GFR = 45-59 mL/min/1 73 square meters)  •  Stage 3B Moderate CKD (GFR = 30-44 mL/min/1 73 square meters)  •  Stage 4 Severe CKD (GFR = 15-29 mL/min/1 73 square meters)  •  Stage 5 End Stage CKD (GFR <15 mL/min/1 73 square meters)  Note: GFR calculation is accurate only with a steady state creatinine    Protime-INR [[id number]]  (Normal) Collected: 06/28/23 0429    Lab Status: Final result Specimen: Blood from Arm, Right Updated: 06/28/23 0511     Protime 13 0 seconds      INR 0 96    APTT [[id number]]  (Normal) Collected: 06/28/23 0429    Lab Status: Final result Specimen: Blood from Arm, Right Updated: 06/28/23 0511     PTT 31 seconds     CBC and differential [[id number]]  (Abnormal) Collected: 06/28/23 0429    Lab Status: Final result Specimen: Blood from Arm, Right Updated: 06/28/23 0508     WBC 22 17 Thousand/uL      RBC 5 80 Million/uL      Hemoglobin 15 2 g/dL      Hematocrit 46 1 %      MCV 80 fL      MCH 26 2 pg      MCHC 33 0 g/dL      RDW 14 8 %      MPV 9 9 fL      Platelets 553 Thousands/uL     Blood culture #2 [[id number]] Collected: 06/28/23 0429    Lab Status: In process Specimen: Blood from Arm, Right Updated: 06/28/23 0457    Blood culture #1 [[id number]] Collected: 06/28/23 0429    Lab Status:  In process Specimen: Blood from Arm, Left Updated: 06/28/23 0457                 CT soft tissue neck with contrast   Final Result by Beto Machado La Nena Lawton MD (06/28 0637)      Abnormal findings compatible with acute tonsillitis  No abscess  Effacement of the right piriform sinus likely due to a small amount of mucus in the sinus  If symptoms persist or progress despite conservative management, ENT consultation within the advised  Incidental finding of mild to moderate narrowing of the proximal right internal carotid artery  No arterial occlusion  This could be followed up with nonemergent neck CTA if clinically warranted  The study was marked in [facility] for immediate notification  Workstation performed: [id number]               Procedures  ECG 12 Lead Documentation Only    Date/Time: 6/28/2023 4:21 AM    Performed by: Margaree Boast, DO  Authorized by: Margaree Boast, DO    Indications / Diagnosis:  Tachycardia  ECG reviewed by me, the ED Provider: yes    Patient location:  ED  Interpretation:     Interpretation: non-specific    Rate:     ECG rate:  101    ECG rate assessment: tachycardic    Rhythm:     Rhythm: sinus rhythm    Ectopy:     Ectopy: none    QRS:     QRS axis:  Left  Conduction:     Conduction: abnormal      Abnormal conduction: LAFB    ST segments:     ST segments:  Normal  T waves:     T waves: normal    Comments:      Sinus Tachycardia with LAFB          ED Course                          Initial Sepsis Screening     Row Name 06/28/23 0519                Is the patient's history suggestive of a new or worsening infection? Yes (Proceed)  -CR        Suspected source of infection soft tissue  Oropharyngeal  -CR        Indicate SIRS criteria Tachycardia > 90 bpm;Leukocytosis (WBC > 36355 IJL) OR Leukopenia (WBC <4000 IJL) OR Bandemia (WBC >10% bands)  -CR        Are two or more of the above signs & symptoms of infection both present and new to the patient?  No  -CR              User Key  (r) = Recorded By, (t) = Taken By, (c) = Cosigned By    [address] Name Provider Type    CR Shannan Horner [address] Bhavana Terry, DO Resident                          Medical Decision Making  Columba Camilo is a 60year old male presenting for evaluation of sore throat, difficulty swallowing, fevers, and change in voice  Symptoms began yesterday and progressively worsened  Patient is able to tolerate his own secretions this point  No difficulty breathing  Patient appear uncomfortable examination, on arrival he was tachycardic and had a low-grade fever  He had swelling of the soft palate on the right side concerning for possible developing peritonsillar abscess  He was able to tolerate his own saliva throughout his stay in the emergency department  No difficulty breathing, respiratory distress  Sepsis work-up initiated, he did have a significant leukocytosis with white blood cell count and 22,000, strep PCR test was positive  No evidence of endorgan damage concerning for severe sepsis  He was given dose of Unasyn and Decadron  CT scan of the tissue of the neck did not show any evidence of abscess, patient appears to have acute tonsillitis  Given that patient was put in his airway and he did tolerate his own secretions, patient stable for discharge at this time, referral to ENT was made and a prescription for Augmentin was sent to his pharmacy, I gave strict return pression's, he was understanding and agreeable to plan  Tonsillitis: acute illness or injury  Amount and/or Complexity of Data Reviewed  Labs: ordered  Decision-making details documented in ED Course  Radiology: ordered  Details: CT scan of the soft tissue of the neck showing acute tonsillitis      Risk  Prescription drug management  Disposition  Final diagnoses:    Tonsillitis     Time reflects when diagnosis was documented in both MDM as applicable and the Disposition within this note     Time User Action Codes Description Comment    6/28/2023  6:16 AM [address] Add [J03 [id number]] Tonsillitis       ED Disposition     ED Disposition   Discharge Condition   Stable    Date/Time   Wed Jun 28, 2023  6:17 AM    Comment   Denisse Lina[facility] discharge to home/self care  Follow-up Information     Follow up With Specialties Details Why Contact Info Additional [address] ENT Otolaryngology Schedule an appointment as soon as possible for a visit   [facility] [address]  [facility], [address] [place] [address], [address] [phone]          Patient's Medications   Discharge Prescriptions    AMOXICILLIN-CLAVULANATE (AUGMENTIN) 875-125 MG PER TABLET    Take 1 tablet by mouth every 12 (twelve) hours for 9 days       Start Date: 6/28/2023 End Date: 7/7/2023       Order Dose: 1 tablet       Quantity: 18 tablet    Refills: 0         PDMP Review       Value Time User    PDMP Reviewed  Yes 12/22/2020  1:20 PM Bhavin Wallace, [address]           ED Provider  Attending physically available and evaluated Sangeeta Martinez I managed the patient along with the ED Attending      Electronically Signed by         Donte Herzog,   06/28/23 9255

## 2023-06-28 NOTE — Clinical Note
Anibal Carrier was seen and treated in our emergency department on 6/28/2023  Diagnosis:     Lola Cervantes  may return to work on return date  He may return on this date: 07/03/2023         If you have any questions or concerns, please don't hesitate to call        Austin Quinn, DO    ______________________________           _______________          _______________  Hospital Representative                              Date                                Time

## 2023-06-28 NOTE — SEPSIS NOTE
Sepsis Note   Len Elias 60 y o  male MRN: [id number]  Unit/Bed#: ED 09 Encounter: [id number]       Initial Sepsis Screening     Row Name 06/28/23 0519                Is the patient's history suggestive of a new or worsening infection? Yes (Proceed)  -CR        Suspected source of infection soft tissue  Oropharyngeal  -CR        Indicate SIRS criteria Tachycardia > 90 bpm;Leukocytosis (WBC > 44528 IJL) OR Leukopenia (WBC <4000 IJL) OR Bandemia (WBC >10% bands)  -CR        Are two or more of the above signs & symptoms of infection both present and new to the patient? No  -CR              User Key  (r) = Recorded By, (t) = Taken By, (c) = Cosigned By    [facility] [address] Name Provider Type    CR Austin lAvarez,  Resident                    There is no height or weight on file to calculate BMI    Wt Readings from Last 1 Encounters:   02/10/23 103 kg (226 lb)        Ideal body weight: 73 kg (160 lb 15 oz)  Adjusted ideal body weight: 84 8 kg (186 lb 15 4 oz)

## 2023-07-02 LAB
BACTERIA BLD CULT: NORMAL
BACTERIA BLD CULT: NORMAL

## 2023-07-03 LAB
BACTERIA BLD CULT: NORMAL
BACTERIA BLD CULT: NORMAL

## 2023-09-14 ENCOUNTER — OFFICE VISIT (OUTPATIENT)
Dept: OTOLARYNGOLOGY | Facility: CLINIC | Age: 58
End: 2023-09-14
Payer: COMMERCIAL

## 2023-09-14 DIAGNOSIS — J35.1 HYPERTROPHY OF TONSILS: ICD-10-CM

## 2023-09-14 DIAGNOSIS — Z87.09 HISTORY OF RECURRENT TONSILLITIS: Primary | ICD-10-CM

## 2023-09-14 DIAGNOSIS — G47.33 OBSTRUCTIVE SLEEP APNEA: ICD-10-CM

## 2023-09-14 DIAGNOSIS — I65.21 ASYMPTOMATIC CAROTID ARTERY STENOSIS, RIGHT: ICD-10-CM

## 2023-09-14 PROCEDURE — 99204 OFFICE O/P NEW MOD 45 MIN: CPT | Performed by: OTOLARYNGOLOGY

## 2023-09-14 NOTE — PROGRESS NOTES
[facility]  [facility] [address]  Otolaryngology      Otolaryngology -- New Patient Visit    Rodri Navarro is a 60 y.o. who presents with a chief complaint of tonsils. HPI:  60years old man with history of recurrent tonsillitis, last tonsillitis was June 2023, previously had another episode approximately 22 years ago (in [facility]). Patient does have a history of smoking, also has snoring and sleep apnea. Was previously using a CPAP but has not been using it since the recall. Currently not having any pain, only snoring, no dysphagia, no dysphonia  We interrogated does have relatively poor quality of sleep only 4 to 5 hours with multiple disruptions during the night. He does have a history of smoking, small cigars approximately 1 or 2/day for over 30 years  No Known Allergies  Past Medical History:   Diagnosis Date   • Colon polyp    • GERD (gastroesophageal reflux disease)    • Hyperlipidemia    • Hypertension    • Sleep apnea      Past Surgical History:   Procedure Laterality Date   • BACK SURGERY  2009   • COLONOSCOPY       No family history on file.   Current Outpatient Medications on File Prior to Visit   Medication Sig Dispense Refill   • acetaminophen (TYLENOL) 500 mg tablet Take 1 tablet (500 mg total) by mouth every 6 (six) hours as needed for mild pain 30 tablet 0   • amLODIPine (NORVASC) 10 mg tablet Take 1 tablet (10 mg total) by mouth daily 90 tablet 1   • atorvastatin (LIPITOR) 40 mg tablet Take 1 tablet (40 mg total) by mouth daily 90 tablet 1   • cyclobenzaprine (FLEXERIL) 10 mg tablet Take 1 tablet (10 mg total) by mouth 2 (two) times a day as needed for muscle spasms 20 tablet 0   • esomeprazole (NexIUM) 40 MG capsule Take 1 capsule (40 mg total) by mouth in the morning 90 capsule 1   • lidocaine (XYLOCAINE) 2 % topical gel Apply topically as needed for mild pain 30 mL 0   • losartan (COZAAR) 25 mg tablet Take 1 tablet (25 mg total) by mouth daily 90 tablet 1   • naproxen (EC NAPROSYN) 500 MG EC tablet Take 1 tablet (500 mg total) by mouth 2 (two) times a day with meals 20 tablet 0     No current facility-administered medications on file prior to visit. Results reviewed; images from any scan have been personally reviewed:        Physical exam:        Constitutional: Oriented to person, place, and time. Well-developed and well-nourished, no apparent distress, non-toxic appearance. Cooperative, able to hear and answer questions without difficulty. Voice: Normal voice quality. Head: Normocephalic, atraumatic. No scars, masses or lesions. Face: Symmetric, no edema, no sinus tenderness. Eyes: Vision grossly intact, extra-ocular movement intact. Right Ear: External ear normal.  Auditory canal clear. Tympanic membrane well-appearing, without retraction or scarring. No fluid present. No post-auricular erythema or tenderness  Left Ear: External ear normal.  Auditory canal clear. Tympanic membrane well-appearing, without retraction or scarring. No fluid present. No post-auricular erythema or tenderness. Nose: Septum midline, nares clear. Mucosa moist, turbinates well appearing. No crusting, polyps or discharge evident. Oral cavity: Dentition intact. Mucosa moist, lips normal.  Tongue mobile, floor of mouth normal.  Hard palate unremarkable. No masses or lesions. Oropharynx: Uvula is midline, sot palate normal.  Unremarkable oropharyngeal inlet. Tonsils 4+, do have very benign appearance. Posterior pharyngeal wall clear. No masses or lesions. Salivary glands:  Parotid glands and submandibular glands symmetric, no enlargement or tenderness. Neck: Normal laryngeal elevation with swallow. Trachea midline. No masses or lesions. No palpable adenopathy. Thyroid: normal in size, unremarkable without tenderness or palpable nodules. Pulmonary/Chest: Normal effort and rate. No respiratory distress. Musculoskeletal: Normal range of motion. Neurological: Cranial nerves 2-12 intact. Skin: Skin is warm and dry. Psychiatric: Normal mood and affect. CT scan of the neck with contrast 6/28/2023 was reviewed. There is significant narrowing of the oral airway due to massively enlarged tonsils right more than left. No evident peritonsillar abscess, there are multiple lymph nodes in the neck but without any appearance of malignancy. Radiologist also reports "incidental finding of mild to moderate narrowing of the proximal right internal carotid artery. No arterial occlusion". There were no vitals taken for this visit. Assessment:   1. History of recurrent tonsillitis        2. Hypertrophy of tonsils  Ambulatory Referral to Otolaryngology      3. Obstructive sleep apnea        4. Asymptomatic carotid artery stenosis, right          Patient with hypertrophy of tonsils that is extremely prominent for his age. This hypertrophy of the tonsils are probably contributing to his obstructive sleep apnea. In addition he does have a history of smoking that increases the risk of head and neck cancer. All of the above was discussed with the patient. Since he is currently asymptomatic he prefers to defer any intervention. I do advised the patient that if at any point he develops any symptoms or change in appearance suggestive of neoplasm he will definitely need a biopsy or the tonsillectomy. Also reviewed with the patient the fact that the tonsillectomy might be helpful to reduce the level of sleep apnea, this could be instrumental in improving the quality of sleep as he is currently not using the CPAP machine. For the purpose of surveillance recommend a follow-up in 3 months. Dictation software was used to dictate this note. It may contain errors with dictating incorrect words/spelling. Please contact provider directly for any questions. Thank you for allowing me to participate in the care of your patient.

## 2023-10-29 DIAGNOSIS — I10 ESSENTIAL HYPERTENSION: ICD-10-CM

## 2023-11-05 RX ORDER — LOSARTAN POTASSIUM 25 MG/1
25 TABLET ORAL DAILY
Qty: 90 TABLET | Refills: 1 | OUTPATIENT
Start: 2023-11-05 | End: 2024-02-03

## 2023-11-05 RX ORDER — AMLODIPINE BESYLATE 10 MG/1
10 TABLET ORAL DAILY
Qty: 90 TABLET | Refills: 1 | OUTPATIENT
Start: 2023-11-05

## 2023-11-14 ENCOUNTER — OFFICE VISIT (OUTPATIENT)
Dept: FAMILY MEDICINE CLINIC | Facility: CLINIC | Age: 58
End: 2023-11-14

## 2023-11-14 ENCOUNTER — APPOINTMENT (OUTPATIENT)
Dept: LAB | Facility: HOSPITAL | Age: 58
End: 2023-11-14
Payer: COMMERCIAL

## 2023-11-14 VITALS
WEIGHT: 224.6 LBS | OXYGEN SATURATION: 96 % | BODY MASS INDEX: 32.23 KG/M2 | HEART RATE: 72 BPM | DIASTOLIC BLOOD PRESSURE: 78 MMHG | SYSTOLIC BLOOD PRESSURE: 140 MMHG

## 2023-11-14 DIAGNOSIS — Z13.9 SCREENING DUE: ICD-10-CM

## 2023-11-14 DIAGNOSIS — M79.89 SWELLING OF RIGHT HAND: ICD-10-CM

## 2023-11-14 DIAGNOSIS — R20.0 EXTREMITY NUMBNESS: ICD-10-CM

## 2023-11-14 DIAGNOSIS — I65.21 CALCIFICATION OF RIGHT CAROTID ARTERY: ICD-10-CM

## 2023-11-14 DIAGNOSIS — I10 ESSENTIAL HYPERTENSION: Primary | ICD-10-CM

## 2023-11-14 DIAGNOSIS — M16.12 PRIMARY OSTEOARTHRITIS OF LEFT HIP: ICD-10-CM

## 2023-11-14 LAB
CHOLEST SERPL-MCNC: 233 MG/DL
CRP SERPL QL: 2.6 MG/L
ERYTHROCYTE [SEDIMENTATION RATE] IN BLOOD: 28 MM/HOUR (ref 0–19)
HDLC SERPL-MCNC: 60 MG/DL
LDLC SERPL CALC-MCNC: 124 MG/DL (ref 0–100)
PSA SERPL-MCNC: 0.32 NG/ML (ref 0–4)
TRIGL SERPL-MCNC: 243 MG/DL

## 2023-11-14 PROCEDURE — 86140 C-REACTIVE PROTEIN: CPT

## 2023-11-14 PROCEDURE — 80061 LIPID PANEL: CPT

## 2023-11-14 PROCEDURE — 99213 OFFICE O/P EST LOW 20 MIN: CPT | Performed by: STUDENT IN AN ORGANIZED HEALTH CARE EDUCATION/TRAINING PROGRAM

## 2023-11-14 PROCEDURE — 3078F DIAST BP <80 MM HG: CPT | Performed by: STUDENT IN AN ORGANIZED HEALTH CARE EDUCATION/TRAINING PROGRAM

## 2023-11-14 PROCEDURE — 36415 COLL VENOUS BLD VENIPUNCTURE: CPT

## 2023-11-14 PROCEDURE — 85652 RBC SED RATE AUTOMATED: CPT

## 2023-11-14 PROCEDURE — G0103 PSA SCREENING: HCPCS

## 2023-11-14 PROCEDURE — 3077F SYST BP >= 140 MM HG: CPT | Performed by: STUDENT IN AN ORGANIZED HEALTH CARE EDUCATION/TRAINING PROGRAM

## 2023-11-14 RX ORDER — LOSARTAN POTASSIUM 25 MG/1
25 TABLET ORAL DAILY
Qty: 90 TABLET | Refills: 1 | Status: SHIPPED | OUTPATIENT
Start: 2023-11-14 | End: 2024-05-12

## 2023-11-14 RX ORDER — AMLODIPINE BESYLATE 10 MG/1
10 TABLET ORAL DAILY
Qty: 90 TABLET | Refills: 1 | Status: SHIPPED | OUTPATIENT
Start: 2023-11-14

## 2023-11-14 NOTE — PROGRESS NOTES
Name: Areli Mcduffie      : 1965      MRN: [id number]  Encounter Provider: Nazia Alfaro MD  Encounter Date: 2023   Encounter department: [address] [facility]     1. Essential hypertension  Assessment & Plan:  -home med; amlodipine 10mg and cozaar 25mg  -today bp 140/78 and at goal of <140/90 per JNC 8   Will continue with home meds    Orders:  -     losartan (COZAAR) 25 mg tablet; Take 1 tablet (25 mg total) by mouth daily for 180 doses  -     amLODIPine (NORVASC) 10 mg tablet; Take 1 tablet (10 mg total) by mouth daily    2. Primary osteoarthritis of left hip  Assessment & Plan:  -Moderate OA on left hip xray back in . Due to progression of pain will repeat left hip xray to evaluate for worsening of OA. Patient requesting referral to surgeon to see if candidate for hip replacement or hip surgery therefore referral placed. May also benefit from corticosteroid injection. F/u in two weeks. Orders:  -     Ambulatory Referral to Orthopedic Surgery; Future  -     XR hip/pelv 2-3 vws left if performed; Future; Expected date: 2023    3. Extremity numbness  Assessment & Plan: Will order ESR and CRP to help delineate between RA and OA respectively. Patient does have known OA of left hip so must consider  elevations in CRP from that. Patient also has mildly decreased sensation of the right pinky. As sensory innervation jerrell right pinky is from ulnar nerve consider ulnar neuropathy. Ptajalil works as a  and types. On physical exam palpation of ulnar nerve at cubital tunnel did not replicate symptoms which suggests no cubital tunnel syndrome but rather may be further downstream of ulnar nerve. Consider source at guyon canal through flexor retinaculum. On f/u visit will need to evaluate more closely and test guyon canal. Previously he was referred for EMG and he did not go.      Also has history of narrowed nueral foramina of C3-C4 and C5-C6  possible has progressed to C8 and/or T1 and thereby ulnar nerve    Orders:  -     Sedimentation rate, automated; Future  -     C-reactive protein; Future    4. Swelling of right hand  Assessment & Plan:  Entire right hand above wrist slightly swollen compared to left. Please see plan for extremity numbness    Orders:  -     Sedimentation rate, automated; Future  -     C-reactive protein; Future    5. Screening due  -     Lipid Panel with Direct LDL reflex; Future  -     PSA, Total Screen; Future    6. Calcification of right carotid artery  Assessment & Plan:  Noted on XRAY from 2021, had VAS carotic study ordered and never completed. Consider following up on future appointment and encourage having a carotid duplex study performed. Subjective     Today patient requesting refills of bl medications. Additional concerns include left hip pain and right extremity swelling. The left lower back and left hip pain has been present for 6 months and affects his daily life. Pain is worse with cold weather. Takes tylenol with improvement. Ices, self-massage. Sometimes twice a day with the tylenol especially at night. Takes extra strength tylenol. Additionally, patient ocmplains of right pinky numbness for about a month, with entire right hand swelling. It is worse in the morning when he first awakes and he feels pins and needles until sensation returns. He has not tried any medications for it. Patient requesting screenign with PSA level. Smokes 1 cigarettes about one a day.  Smokes for a couple of days    Review of Systems    Current Outpatient Medications on File Prior to Visit   Medication Sig    acetaminophen (TYLENOL) 500 mg tablet Take 1 tablet (500 mg total) by mouth every 6 (six) hours as needed for mild pain    cyclobenzaprine (FLEXERIL) 10 mg tablet Take 1 tablet (10 mg total) by mouth 2 (two) times a day as needed for muscle spasms    lidocaine (XYLOCAINE) 2 % topical gel Apply topically as needed for mild pain    atorvastatin (LIPITOR) 40 mg tablet Take 1 tablet (40 mg total) by mouth daily    esomeprazole (NexIUM) 40 MG capsule Take 1 capsule (40 mg total) by mouth in the morning    naproxen (EC NAPROSYN) 500 MG EC tablet Take 1 tablet (500 mg total) by mouth 2 (two) times a day with meals       Objective     /78 (BP Location: Right arm, Patient Position: Sitting, Cuff Size: Standard)   Pulse 72   Wt 102 kg (224 lb 9.6 oz)   SpO2 96%   BMI 32.23 kg/m²     Physical Exam  Constitutional:       Appearance: Normal appearance. He is normal weight. HENT:      Head: Normocephalic and atraumatic. Right Ear: External ear normal.      Left Ear: External ear normal.      Nose: Nose normal.      Mouth/Throat:      Mouth: Mucous membranes are moist.      Pharynx: Oropharynx is clear. Eyes:      General: No scleral icterus. Right eye: No discharge. Left eye: No discharge. Extraocular Movements: Extraocular movements intact. Conjunctiva/sclera: Conjunctivae normal.   Cardiovascular:      Rate and Rhythm: Normal rate and regular rhythm. Pulses: Normal pulses. Heart sounds: Normal heart sounds. No murmur heard. No friction rub. No gallop. Pulmonary:      Effort: Pulmonary effort is normal.      Breath sounds: Normal breath sounds. No wheezing, rhonchi or rales. Abdominal:      General: Abdomen is flat. Bowel sounds are normal. There is no distension. Palpations: Abdomen is soft. Tenderness: There is no abdominal tenderness. Musculoskeletal:         General: Normal range of motion. Cervical back: Normal range of motion. Comments: Tinel and phalen sign negative  No tenderness or reproduction of pins and needles with palpation of cubital tunnel  Upper and lower extremity strength 5+    Straight leg raise test positive of left leg at 60 degrees  No pain with palpation of piriformis muscle. No paraspinal muscle spasm.  Pain with palpation of lumbar L4/L5 spinal processes. No sensory deficit in lower extremities. Skin:     General: Skin is warm. Capillary Refill: Capillary refill takes less than 2 seconds. Findings: No rash. Neurological:      General: No focal deficit present. Mental Status: He is alert and oriented to person, place, and time. Sensory: Sensory deficit (of right pinky) present. Psychiatric:         Mood and Affect: Mood normal.         Behavior: Behavior normal.         Thought Content:  Thought content normal.         Judgment: Judgment normal.       Anca Mccall MD

## 2023-11-20 ENCOUNTER — TELEPHONE (OUTPATIENT)
Dept: FAMILY MEDICINE CLINIC | Facility: CLINIC | Age: 58
End: 2023-11-20

## 2023-11-20 PROBLEM — I65.21 CALCIFICATION OF RIGHT CAROTID ARTERY: Status: ACTIVE | Noted: 2023-11-20

## 2023-11-20 PROBLEM — M79.89 SWELLING OF RIGHT HAND: Status: ACTIVE | Noted: 2023-11-20

## 2023-11-20 NOTE — ASSESSMENT & PLAN NOTE
Will order ESR and CRP to help delineate between RA and OA respectively. Patient does have known OA of left hip so must consider  elevations in CRP from that. Patient also has mildly decreased sensation of the right pinky. As sensory innervation jerrell right pinky is from ulnar nerve consider ulnar neuropathy. Ptaient works as a  and types. On physical exam palpation of ulnar nerve at cubital tunnel did not replicate symptoms which suggests no cubital tunnel syndrome but rather may be further downstream of ulnar nerve. Consider source at guyon canal through flexor retinaculum. On f/u visit will need to evaluate more closely and test guyon canal. Previously he was referred for EMG and he did not go.      Also has history of narrowed nueral foramina of C3-C4 and C5-C6  possible has progressed to C8 and/or T1 and thereby ulnar nerve

## 2023-11-20 NOTE — ASSESSMENT & PLAN NOTE
Entire right hand above wrist slightly swollen compared to left.  Please see plan for extremity numbness

## 2023-11-20 NOTE — ASSESSMENT & PLAN NOTE
-home med; amlodipine 10mg and cozaar 25mg  -today bp 140/78 and at goal of <140/90 per JNC 8   Will continue with home meds

## 2023-11-20 NOTE — ASSESSMENT & PLAN NOTE
Noted on XRAY from 2021, had VAS carotic study ordered and never completed. Consider following up on future appointment and encourage having a carotid duplex study performed.

## 2023-11-20 NOTE — ASSESSMENT & PLAN NOTE
-Moderate OA on left hip xray back in 2021. Due to progression of pain will repeat left hip xray to evaluate for worsening of OA. Patient requesting referral to surgeon to see if candidate for hip replacement or hip surgery therefore referral placed. May also benefit from corticosteroid injection. F/u in two weeks. Considering cervical and lumbar OA patient may benefit from comprehensive spine program where can get PT and other services.  Will consider referring on next visit to comprehensive spine program.

## 2023-11-20 NOTE — TELEPHONE ENCOUNTER
1/20/23    PT LEFT MESSAGE REQUESTING REFILL ON THE FOLLOWING MED:   esomeprazole (NexIUM) 40 MG capsule     ANY QUESTIONS, PLEASE CONTACT PT

## 2023-11-21 ENCOUNTER — TELEPHONE (OUTPATIENT)
Dept: FAMILY MEDICINE CLINIC | Facility: CLINIC | Age: 58
End: 2023-11-21

## 2023-11-21 DIAGNOSIS — Z87.19 HISTORY OF HIATAL HERNIA: ICD-10-CM

## 2023-11-21 DIAGNOSIS — K21.9 GASTROESOPHAGEAL REFLUX DISEASE, UNSPECIFIED WHETHER ESOPHAGITIS PRESENT: ICD-10-CM

## 2023-11-21 DIAGNOSIS — K44.9 HIATAL HERNIA: Primary | ICD-10-CM

## 2023-11-22 DIAGNOSIS — E78.2 MIXED HYPERLIPIDEMIA: ICD-10-CM

## 2023-11-22 RX ORDER — ATORVASTATIN CALCIUM 40 MG/1
40 TABLET, FILM COATED ORAL DAILY
Qty: 90 TABLET | Refills: 1 | Status: SHIPPED | OUTPATIENT
Start: 2023-11-22

## 2023-11-22 NOTE — TELEPHONE ENCOUNTER
Called patient to discuss nexium and clarify other refills he is requesting. (No answer/busy). Recently seen in office and at that time only requested refills of hypertensive medications. Will send nexium to pharmacy and will reach clinical staff to call patient and let him know it was sent.

## 2023-11-22 NOTE — RESULT ENCOUNTER NOTE
Please call patient and let him know his cholesterol, triglycerides, and LDL are all elevated. I would recommend he take atorvastatin 40mg daily to assist with his elevated lipids but also to decrease his cardiovascular risk.  Thanks
36.9

## 2023-11-22 NOTE — TELEPHONE ENCOUNTER
Upon further chart review patient had EGD in 2020 revealed sliding hiatal hernia, otherwise mucosa and biopsies unremarkable. Reviewed approprite dosing for esomeprazole  and per shirlene dosing is 20mg therefore changed dose to 20mg and gave for two months worth as patient has sliding hiatal hernia. Will let clinical team know to inform patient of changes. He has a f/u with me on 11/29 where we can discuss further.

## 2023-11-24 ENCOUNTER — TELEPHONE (OUTPATIENT)
Dept: FAMILY MEDICINE CLINIC | Facility: CLINIC | Age: 58
End: 2023-11-24

## 2023-11-24 NOTE — TELEPHONE ENCOUNTER
----- Message from Sunny Gabriel MD sent at 11/21/2023 10:45 PM EST -----  Regarding: esomeprazole  Please let patient know his esomeprazole was prescribed however I decreased the dose by half and gave a supply for 60 days. We have an upcoming appointment on 11/29 where we can discuss further. Please stress that when he recently saw me he did not ask for a refill of his esomeprazole. We will need to discuss further as the medication is not without it's side effects and it may not be in his best interest to use consistently for a long period of time.      Thanks,   Dr. Ghanshyam Forte

## 2023-12-07 ENCOUNTER — APPOINTMENT (OUTPATIENT)
Dept: RADIOLOGY | Facility: MEDICAL CENTER | Age: 58
End: 2023-12-07
Payer: COMMERCIAL

## 2023-12-07 ENCOUNTER — OFFICE VISIT (OUTPATIENT)
Dept: OBGYN CLINIC | Facility: MEDICAL CENTER | Age: 58
End: 2023-12-07
Payer: COMMERCIAL

## 2023-12-07 VITALS
SYSTOLIC BLOOD PRESSURE: 142 MMHG | HEIGHT: 70 IN | DIASTOLIC BLOOD PRESSURE: 76 MMHG | BODY MASS INDEX: 31.35 KG/M2 | HEART RATE: 70 BPM | WEIGHT: 219 LBS

## 2023-12-07 DIAGNOSIS — M51.37 DDD (DEGENERATIVE DISC DISEASE), LUMBOSACRAL: ICD-10-CM

## 2023-12-07 DIAGNOSIS — G89.29 CHRONIC BILATERAL LOW BACK PAIN WITHOUT SCIATICA: ICD-10-CM

## 2023-12-07 DIAGNOSIS — M51.36 LUMBAR DEGENERATIVE DISC DISEASE: ICD-10-CM

## 2023-12-07 DIAGNOSIS — M54.50 CHRONIC BILATERAL LOW BACK PAIN WITHOUT SCIATICA: ICD-10-CM

## 2023-12-07 DIAGNOSIS — M25.552 PAIN IN LEFT HIP: ICD-10-CM

## 2023-12-07 DIAGNOSIS — M16.12 PRIMARY OSTEOARTHRITIS OF ONE HIP, LEFT: ICD-10-CM

## 2023-12-07 DIAGNOSIS — M25.552 PAIN IN LEFT HIP: Primary | ICD-10-CM

## 2023-12-07 PROCEDURE — 73502 X-RAY EXAM HIP UNI 2-3 VIEWS: CPT

## 2023-12-07 PROCEDURE — 99214 OFFICE O/P EST MOD 30 MIN: CPT | Performed by: EMERGENCY MEDICINE

## 2023-12-07 NOTE — PROGRESS NOTES
Assessment/Plan:    Diagnoses and all orders for this visit:    Pain in left hip  -     XR hip/pelv 2-3 vws left if performed; Future  -     FL spine and pain procedure; Future  -     Ambulatory Referral to Physical Therapy; Future    Primary osteoarthritis of one hip, left  -     XR hip/pelv 2-3 vws left if performed; Future  -     FL spine and pain procedure; Future  -     Ambulatory Referral to Physical Therapy; Future    Lumbar degenerative disc disease  -     Ambulatory Referral to Physical Therapy; Future    DDD (degenerative disc disease), lumbosacral  -     Ambulatory Referral to Physical Therapy; Future    Chronic bilateral low back pain without sciatica  -     Ambulatory Referral to Physical Therapy; Future    FL guided Left hip CSI  PT for hip and back    Return for 2 weeks after CSI. Subjective:   Patient ID: Michelle Officer is a 60 y.o. male. NP presents for worsening years of Left hip and buttocks pain and left lateral knee pain typically worse lying on his left side, trouble with stairs, isn't able to tie his left shoes due to pain. Denies injury. Notes a hx of chronic back pain        Review of Systems    The following portions of the patient's chart were reviewed and updated as appropriate:    Allergy:  No Known Allergies    Medications:    Current Outpatient Medications:     acetaminophen (TYLENOL) 500 mg tablet, Take 1 tablet (500 mg total) by mouth every 6 (six) hours as needed for mild pain, Disp: 30 tablet, Rfl: 0    amLODIPine (NORVASC) 10 mg tablet, Take 1 tablet (10 mg total) by mouth daily, Disp: 90 tablet, Rfl: 1    atorvastatin (LIPITOR) 40 mg tablet, Take 1 tablet (40 mg total) by mouth daily, Disp: 90 tablet, Rfl: 1    cyclobenzaprine (FLEXERIL) 10 mg tablet, Take 1 tablet (10 mg total) by mouth 2 (two) times a day as needed for muscle spasms, Disp: 20 tablet, Rfl: 0    esomeprazole (NexIUM) 20 mg capsule, Take 1 capsule (20 mg total) by mouth daily in the early morning, Disp: 30 capsule, Rfl: 1    lidocaine (XYLOCAINE) 2 % topical gel, Apply topically as needed for mild pain, Disp: 30 mL, Rfl: 0    losartan (COZAAR) 25 mg tablet, Take 1 tablet (25 mg total) by mouth daily for 180 doses, Disp: 90 tablet, Rfl: 1    esomeprazole (NexIUM) 40 MG capsule, Take 1 capsule (40 mg total) by mouth in the morning, Disp: 90 capsule, Rfl: 1    naproxen (EC NAPROSYN) 500 MG EC tablet, Take 1 tablet (500 mg total) by mouth 2 (two) times a day with meals, Disp: 20 tablet, Rfl: 0    Patient Active Problem List   Diagnosis    Essential hypertension    LOUIE (obstructive sleep apnea)    Chronic midline low back pain    Encounter for screening colonoscopy    Class 2 obesity in adult    Mixed hyperlipidemia    Gastroesophageal reflux disease    Difficulty using continuous positive airway pressure (CPAP) device    Primary osteoarthritis of left hip    BMI 34.0-34.9,adult    Mood changes    Nocturia    Extremity numbness    Fatigue    Ulnar neuropathy of both upper extremities    Bilateral carpal tunnel syndrome    Swelling of right hand    Calcification of right carotid artery       Objective:  /76   Pulse 70   Ht 5' 10" (1.778 m)   Wt 99.3 kg (219 lb)   BMI 31.42 kg/m²     Left Hip Exam     Tenderness   The patient is experiencing tenderness in the greater trochanter. Range of Motion   Abduction:  abnormal   External rotation:  abnormal   Internal rotation: abnormal     Comments:  Symptoms with ext/int ROM and SLR            Physical Exam      Neurologic Exam    Procedures    I have personally reviewed pertinent films in PACS and my interpretation is x-ray left hip revealing severe bone-on-bone osteoarthritis of the superior joint space. There is heterotopic calcification of the ischial tuberosity.             Past Medical History:   Diagnosis Date    Colon polyp     GERD (gastroesophageal reflux disease)     Hyperlipidemia     Hypertension     Sleep apnea        Past Surgical History:   Procedure Laterality Date    BACK SURGERY  2009    COLONOSCOPY         Social History     Socioeconomic History    Marital status: Single     Spouse name: Not on file    Number of children: Not on file    Years of education: Not on file    Highest education level: Not on file   Occupational History    Not on file   Tobacco Use    Smoking status: Former     Packs/day: 0.50     Types: Cigars, Cigarettes     Quit date: 7/28/2020     Years since quitting: 3.3    Smokeless tobacco: Never   Vaping Use    Vaping Use: Never used   Substance and Sexual Activity    Alcohol use: Yes     Comment: 2-3x/week 1 bottle of wine    Drug use: Never    Sexual activity: Yes     Partners: Female     Comment: same for 25 years   Other Topics Concern    Not on file   Social History Narrative    Not on file     Social Determinants of Health     Financial Resource Strain: Not on file   Food Insecurity: Not on file   Transportation Needs: No Transportation Needs (11/14/2023)    PRAPARE - Transportation     Lack of Transportation (Medical): No     Lack of Transportation (Non-Medical): No   Physical Activity: Not on file   Stress: Not on file   Social Connections: Not on file   Intimate Partner Violence: Not on file   Housing Stability: Not on file       History reviewed. No pertinent family history.

## 2023-12-12 ENCOUNTER — HOSPITAL ENCOUNTER (OUTPATIENT)
Dept: RADIOLOGY | Facility: MEDICAL CENTER | Age: 58
Discharge: HOME | End: 2023-12-12
Payer: COMMERCIAL

## 2023-12-12 VITALS
OXYGEN SATURATION: 98 % | DIASTOLIC BLOOD PRESSURE: 89 MMHG | RESPIRATION RATE: 18 BRPM | TEMPERATURE: 98.4 F | HEART RATE: 61 BPM | SYSTOLIC BLOOD PRESSURE: 151 MMHG

## 2023-12-12 DIAGNOSIS — M16.12 PRIMARY OSTEOARTHRITIS OF ONE HIP, LEFT: ICD-10-CM

## 2023-12-12 DIAGNOSIS — M25.552 PAIN IN LEFT HIP: ICD-10-CM

## 2023-12-12 PROCEDURE — 77002 NEEDLE LOCALIZATION BY XRAY: CPT

## 2023-12-12 PROCEDURE — 20610 DRAIN/INJ JOINT/BURSA W/O US: CPT | Performed by: PHYSICAL MEDICINE & REHABILITATION

## 2023-12-12 PROCEDURE — 77002 NEEDLE LOCALIZATION BY XRAY: CPT | Performed by: PHYSICAL MEDICINE & REHABILITATION

## 2023-12-12 RX ORDER — BUPIVACAINE HCL/PF 2.5 MG/ML
3 VIAL (ML) INJECTION ONCE
Status: COMPLETED | OUTPATIENT
Start: 2023-12-12 | End: 2023-12-12

## 2023-12-12 RX ORDER — METHYLPREDNISOLONE ACETATE 40 MG/ML
40 INJECTION, SUSPENSION INTRA-ARTICULAR; INTRALESIONAL; INTRAMUSCULAR; PARENTERAL; SOFT TISSUE ONCE
Status: COMPLETED | OUTPATIENT
Start: 2023-12-12 | End: 2023-12-12

## 2023-12-12 RX ADMIN — METHYLPREDNISOLONE ACETATE 40 MG: 40 INJECTION, SUSPENSION INTRA-ARTICULAR; INTRALESIONAL; INTRAMUSCULAR; PARENTERAL; SOFT TISSUE at 16:39

## 2023-12-12 RX ADMIN — Medication 3 ML: at 16:39

## 2023-12-12 RX ADMIN — IOHEXOL 2 ML: 300 INJECTION, SOLUTION INTRAVENOUS at 16:39

## 2023-12-12 NOTE — H&P
History of Present Illness:  The patient is a 60 y.o. male who presents with complaints of left hip pain    Past Medical History:   Diagnosis Date    Colon polyp     GERD (gastroesophageal reflux disease)     Hyperlipidemia     Hypertension     Sleep apnea        Past Surgical History:   Procedure Laterality Date    BACK SURGERY  2009    COLONOSCOPY           Current Outpatient Medications:     acetaminophen (TYLENOL) 500 mg tablet, Take 1 tablet (500 mg total) by mouth every 6 (six) hours as needed for mild pain, Disp: 30 tablet, Rfl: 0    amLODIPine (NORVASC) 10 mg tablet, Take 1 tablet (10 mg total) by mouth daily, Disp: 90 tablet, Rfl: 1    atorvastatin (LIPITOR) 40 mg tablet, Take 1 tablet (40 mg total) by mouth daily, Disp: 90 tablet, Rfl: 1    cyclobenzaprine (FLEXERIL) 10 mg tablet, Take 1 tablet (10 mg total) by mouth 2 (two) times a day as needed for muscle spasms, Disp: 20 tablet, Rfl: 0    esomeprazole (NexIUM) 20 mg capsule, Take 1 capsule (20 mg total) by mouth daily in the early morning, Disp: 30 capsule, Rfl: 1    esomeprazole (NexIUM) 40 MG capsule, Take 1 capsule (40 mg total) by mouth in the morning, Disp: 90 capsule, Rfl: 1    lidocaine (XYLOCAINE) 2 % topical gel, Apply topically as needed for mild pain, Disp: 30 mL, Rfl: 0    losartan (COZAAR) 25 mg tablet, Take 1 tablet (25 mg total) by mouth daily for 180 doses, Disp: 90 tablet, Rfl: 1    naproxen (EC NAPROSYN) 500 MG EC tablet, Take 1 tablet (500 mg total) by mouth 2 (two) times a day with meals, Disp: 20 tablet, Rfl: 0    No Known Allergies    Physical Exam:   Vitals:    12/12/23 1616   BP: 137/75   Pulse: 55   Resp: 18   Temp: 98.4 °F (36.9 °C)   SpO2: 97%     General: Awake, Alert, Oriented x 3, Mood and affect appropriate  Respiratory: Respirations even and unlabored  Cardiovascular: Peripheral pulses intact; no edema  Musculoskeletal Exam: left hip pain    ASA Score: 3    Patient/Chart Verification  Patient ID Verified: Verbal  ID Band Applied: No  Consents Confirmed: Procedural  H&P( within 30 days) Verified: To be obtained in the Pre-Procedure area  Interval H&P(within 24 hr) Complete (required for Outpatients and Surgery Admit only): To be obtained in the Pre-Procedure area  Allergies Reviewed: Yes  Anticoag/NSAID held?: NA  Currently on antibiotics?: No    Assessment:   1. Pain in left hip    2.  Primary osteoarthritis of one hip, left        Plan: LEFT HIP PAIN / OA

## 2023-12-12 NOTE — DISCHARGE INSTRUCTIONS

## 2023-12-15 NOTE — PROGRESS NOTES
Chart Review  08/16/2022    [facility] called Mr Sauceda Ma regarding Waiver Program/Financial Part   Komal Mendoza did not answer at this time  [facility] left voicemail to please call back at his convenience  [facility] will continue to follow up  Next follow up was scheduled on 08/19/2022  The 10-year ASCVD risk score (Linus DAVIS, et al., 2019) is: 2.6%    Values used to calculate the score:      Age: 53 years      Sex: Female      Is Non- : No      Diabetic: No      Tobacco smoker: No      Systolic Blood Pressure: 112 mmHg      Is BP treated: No      HDL Cholesterol: 30 mg/dL      Total Cholesterol: 200 mg/dL

## 2023-12-19 ENCOUNTER — TELEPHONE (OUTPATIENT)
Dept: PAIN MEDICINE | Facility: CLINIC | Age: 58
End: 2023-12-19

## 2023-12-20 ENCOUNTER — OFFICE VISIT (OUTPATIENT)
Dept: FAMILY MEDICINE CLINIC | Facility: CLINIC | Age: 58
End: 2023-12-20

## 2023-12-20 ENCOUNTER — TELEPHONE (OUTPATIENT)
Dept: FAMILY MEDICINE CLINIC | Facility: CLINIC | Age: 58
End: 2023-12-20

## 2023-12-20 VITALS
SYSTOLIC BLOOD PRESSURE: 170 MMHG | HEART RATE: 67 BPM | BODY MASS INDEX: 32.35 KG/M2 | DIASTOLIC BLOOD PRESSURE: 80 MMHG | RESPIRATION RATE: 18 BRPM | WEIGHT: 226 LBS | TEMPERATURE: 98.7 F | OXYGEN SATURATION: 99 % | HEIGHT: 70 IN

## 2023-12-20 DIAGNOSIS — I10 ESSENTIAL HYPERTENSION: ICD-10-CM

## 2023-12-20 DIAGNOSIS — R20.0 EXTREMITY NUMBNESS: Primary | ICD-10-CM

## 2023-12-20 PROCEDURE — 3077F SYST BP >= 140 MM HG: CPT | Performed by: STUDENT IN AN ORGANIZED HEALTH CARE EDUCATION/TRAINING PROGRAM

## 2023-12-20 PROCEDURE — 99213 OFFICE O/P EST LOW 20 MIN: CPT | Performed by: STUDENT IN AN ORGANIZED HEALTH CARE EDUCATION/TRAINING PROGRAM

## 2023-12-20 PROCEDURE — 3079F DIAST BP 80-89 MM HG: CPT | Performed by: STUDENT IN AN ORGANIZED HEALTH CARE EDUCATION/TRAINING PROGRAM

## 2023-12-20 RX ORDER — BLOOD PRESSURE TEST KIT
KIT MISCELLANEOUS 2 TIMES DAILY
Qty: 1 KIT | Refills: 0 | Status: SHIPPED | OUTPATIENT
Start: 2023-12-20

## 2023-12-20 NOTE — TELEPHONE ENCOUNTER
Patient called and requested call back to verify appointment scheduled for 12/20/23. Pt was informed.

## 2023-12-20 NOTE — PROGRESS NOTES
Name: Neptali Elias      : 1965      MRN: [id number]  Encounter Provider: Rebecca Fay Kab-Perlman, MD  Encounter Date: 2023   Encounter department: [facility]    Assessment & Plan     1. 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 At this time shared decision making not to seek surgery (release of flexor retinaculum) and recommend to continue wearing wrist brace at night and avoid pressure to flexor retinaculum.       2. Essential hypertension  Assessment & Plan:  -Today bp of 174/104 initially with manual repeat performed by me of 170/80 and therefore not at goal of <140/90 per JNC 8.   -home meds include cozaar 25mg and amlodipine 10mg. Patient reports he took both today AM  -in the absence of concerning symptoms and likely elevation due to recent consumption of caffeine will not change medications however prescribed blood pressure kit, requested twice daily measurements with bp diary to be brought to annual physical in one week.   -ED precautions including sudden vision changes provided.     Orders:  -     Blood Pressure KIT; Use 2 (two) times a day        Depression Screening and Follow-up Plan: Patient was screened for depression during today's encounter. They screened negative with a PHQ-2 score of 0.        Subjective      Neptali Elias is a 59 yo male patient presenting today to f/u regarding extremity numbness that appears to be at the distribution of the ulnar nerve and with hx of neural foramina narrowing of cervical spine. Previously we ordered CRP dn ESR with normal CRP and mildly elevated ESR at 28. Today patient reports that his numbness of the right pinky, right hand medial palmar aspect and part of his right ring finger has improved overall. He reports that sometimes he wakes up in the morning with a numb hand that improves with raising and that lately that is the only time it occurs. He has been  wearing his wifes carpal tunnel brace at night and it helps.     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 It begins at the wrist and only affects the ulnar nerve distribution    Patient reports that he drinks about 2/3 coffees every morning, his most recent cup today morning was at 1pm. He reports no headaches, no vision changes. He does not have a blood pressure cuff at home and does not check his blood pressure.     He will return in one week for an annual physical exam.     Review of Systems   Constitutional:  Negative for fatigue and fever.   Eyes:  Negative for photophobia and visual disturbance.   Cardiovascular:  Negative for chest pain and palpitations.   Musculoskeletal:  Negative for joint swelling.   Skin:  Negative for color change and rash.   Neurological:  Negative for dizziness, light-headedness and headaches.       Current Outpatient Medications on File Prior to Visit   Medication Sig    acetaminophen (TYLENOL) 500 mg tablet Take 1 tablet (500 mg total) by mouth every 6 (six) hours as needed for mild pain    amLODIPine (NORVASC) 10 mg tablet Take 1 tablet (10 mg total) by mouth daily    atorvastatin (LIPITOR) 40 mg tablet Take 1 tablet (40 mg total) by mouth daily    cyclobenzaprine (FLEXERIL) 10 mg tablet Take 1 tablet (10 mg total) by mouth 2 (two) times a day as needed for muscle spasms    esomeprazole (NexIUM) 20 mg capsule Take 1 capsule (20 mg total) by mouth daily in the early morning    esomeprazole (NexIUM) 40 MG capsule Take 1 capsule (40 mg total) by mouth in the morning    lidocaine (XYLOCAINE) 2 % topical gel Apply topically as needed for mild pain    losartan (COZAAR) 25 mg tablet Take 1 tablet (25 mg total) by mouth daily for 180 doses    naproxen (EC NAPROSYN) 500 MG EC tablet Take 1 tablet  "(500 mg total) by mouth 2 (two) times a day with meals       Objective     /80 (BP Location: Left arm, Patient Position: Sitting, Cuff Size: Large)   Pulse 67   Temp 98.7 °F (37.1 °C) (Temporal)   Resp 18   Ht 5' 10\" (1.778 m)   Wt 103 kg (226 lb)   SpO2 99%   BMI 32.43 kg/m²     Physical Exam  Constitutional:       Appearance: Normal appearance. He is normal weight.   HENT:      Head: Normocephalic and atraumatic.      Right Ear: External ear normal.      Left Ear: External ear normal.      Nose: Nose normal.      Mouth/Throat:      Mouth: Mucous membranes are moist.      Pharynx: Oropharynx is clear.   Eyes:      General: No scleral icterus.        Right eye: No discharge.         Left eye: No discharge.      Extraocular Movements: Extraocular movements intact.      Conjunctiva/sclera: Conjunctivae normal.   Cardiovascular:      Rate and Rhythm: Normal rate and regular rhythm.      Pulses: Normal pulses.      Heart sounds: Normal heart sounds. No murmur heard.     No friction rub. No gallop.   Pulmonary:      Effort: Pulmonary effort is normal.      Breath sounds: Normal breath sounds. No wheezing, rhonchi or rales.   Abdominal:      General: Abdomen is flat. Bowel sounds are normal. There is no distension.      Palpations: Abdomen is soft.      Tenderness: There is no abdominal tenderness.   Musculoskeletal:         General: Normal range of motion.      Cervical back: Normal range of motion.      Comments: 5+ upper and lower extremities  No reproduction of numbness/tingling with palpation of ulnar tunnel, no reproduction of numbness/tingling with tapping of medial flexor retinaculum, tinel and phalen sign negative   Skin:     General: Skin is warm.      Capillary Refill: Capillary refill takes less than 2 seconds.      Findings: No rash.   Neurological:      General: No focal deficit present.      Mental Status: He is alert and oriented to person, place, and time. Mental status is at baseline.      " Sensory: Sensory deficit (mild deficit of ulnar distrubution of right hand) present.      Motor: No weakness.      Coordination: Coordination normal.      Gait: Gait normal.   Psychiatric:         Mood and Affect: Mood normal.         Behavior: Behavior normal.         Thought Content: Thought content normal.         Judgment: Judgment normal.       Rebecca Fay Kab-Perlman, MD

## 2023-12-20 NOTE — ASSESSMENT & PLAN NOTE
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 At this time shared decision making not to seek surgery (release of flexor retinaculum) and recommend to continue wearing wrist brace at night and avoid pressure to flexor retinaculum.

## 2023-12-21 NOTE — ASSESSMENT & PLAN NOTE
-Today bp of 174/104 initially with manual repeat performed by me of 170/80 and therefore not at goal of <140/90 per JNC 8.   -home meds include cozaar 25mg and amlodipine 10mg. Patient reports he took both today AM  -in the absence of concerning symptoms and likely elevation due to recent consumption of caffeine will not change medications however prescribed blood pressure kit, requested twice daily measurements with bp diary to be brought to annual physical in one week.   -ED precautions including sudden vision changes provided.

## 2023-12-27 ENCOUNTER — TELEPHONE (OUTPATIENT)
Dept: FAMILY MEDICINE CLINIC | Facility: CLINIC | Age: 58
End: 2023-12-27

## 2023-12-27 NOTE — TELEPHONE ENCOUNTER
first attempt to contact patient. left message to return my call on answering machine, needs to reschedule.

## 2023-12-29 ENCOUNTER — TELEPHONE (OUTPATIENT)
Dept: FAMILY MEDICINE CLINIC | Facility: CLINIC | Age: 58
End: 2023-12-29

## 2024-01-08 PROBLEM — K44.9 SLIDING HIATAL HERNIA: Status: ACTIVE | Noted: 2024-01-08

## 2024-01-08 PROBLEM — K64.4 EXTERNAL HEMORRHOIDS: Status: ACTIVE | Noted: 2024-01-08

## 2024-01-08 PROBLEM — Z00.00 HEALTHCARE MAINTENANCE: Status: ACTIVE | Noted: 2024-01-08

## 2024-01-08 PROBLEM — K57.90 DIVERTICULOSIS: Status: ACTIVE | Noted: 2024-01-08

## 2024-01-10 ENCOUNTER — OFFICE VISIT (OUTPATIENT)
Dept: FAMILY MEDICINE CLINIC | Facility: CLINIC | Age: 59
End: 2024-01-10

## 2024-01-10 VITALS
HEIGHT: 70 IN | DIASTOLIC BLOOD PRESSURE: 90 MMHG | HEART RATE: 67 BPM | WEIGHT: 223 LBS | RESPIRATION RATE: 18 BRPM | OXYGEN SATURATION: 98 % | SYSTOLIC BLOOD PRESSURE: 150 MMHG | TEMPERATURE: 97.6 F | BODY MASS INDEX: 31.92 KG/M2

## 2024-01-10 DIAGNOSIS — I10 ESSENTIAL HYPERTENSION: ICD-10-CM

## 2024-01-10 DIAGNOSIS — Z23 ENCOUNTER FOR IMMUNIZATION: ICD-10-CM

## 2024-01-10 DIAGNOSIS — G47.33 OSA (OBSTRUCTIVE SLEEP APNEA): ICD-10-CM

## 2024-01-10 DIAGNOSIS — R45.86 MOOD CHANGES: ICD-10-CM

## 2024-01-10 DIAGNOSIS — T78.40XA ALLERGY, INITIAL ENCOUNTER: ICD-10-CM

## 2024-01-10 DIAGNOSIS — E78.2 MIXED HYPERLIPIDEMIA: ICD-10-CM

## 2024-01-10 DIAGNOSIS — K64.4 EXTERNAL HEMORRHOIDS: ICD-10-CM

## 2024-01-10 DIAGNOSIS — K57.90 DIVERTICULOSIS: ICD-10-CM

## 2024-01-10 DIAGNOSIS — Z00.00 ANNUAL PHYSICAL EXAM: Primary | ICD-10-CM

## 2024-01-10 DIAGNOSIS — K44.9 SLIDING HIATAL HERNIA: ICD-10-CM

## 2024-01-10 DIAGNOSIS — Z00.00 HEALTHCARE MAINTENANCE: ICD-10-CM

## 2024-01-10 PROBLEM — Z12.11 ENCOUNTER FOR SCREENING COLONOSCOPY: Status: RESOLVED | Noted: 2020-10-19 | Resolved: 2024-01-10

## 2024-01-10 PROCEDURE — 99213 OFFICE O/P EST LOW 20 MIN: CPT | Performed by: STUDENT IN AN ORGANIZED HEALTH CARE EDUCATION/TRAINING PROGRAM

## 2024-01-10 PROCEDURE — 90686 IIV4 VACC NO PRSV 0.5 ML IM: CPT | Performed by: STUDENT IN AN ORGANIZED HEALTH CARE EDUCATION/TRAINING PROGRAM

## 2024-01-10 PROCEDURE — 3079F DIAST BP 80-89 MM HG: CPT | Performed by: STUDENT IN AN ORGANIZED HEALTH CARE EDUCATION/TRAINING PROGRAM

## 2024-01-10 PROCEDURE — 99396 PREV VISIT EST AGE 40-64: CPT | Performed by: STUDENT IN AN ORGANIZED HEALTH CARE EDUCATION/TRAINING PROGRAM

## 2024-01-10 PROCEDURE — 3077F SYST BP >= 140 MM HG: CPT | Performed by: STUDENT IN AN ORGANIZED HEALTH CARE EDUCATION/TRAINING PROGRAM

## 2024-01-10 PROCEDURE — 90471 IMMUNIZATION ADMIN: CPT | Performed by: STUDENT IN AN ORGANIZED HEALTH CARE EDUCATION/TRAINING PROGRAM

## 2024-01-10 RX ORDER — FLUTICASONE PROPIONATE 50 MCG
1 SPRAY, SUSPENSION (ML) NASAL DAILY
Qty: 16 G | Refills: 0 | Status: SHIPPED | OUTPATIENT
Start: 2024-01-10

## 2024-01-10 RX ORDER — ATORVASTATIN CALCIUM 40 MG/1
40 TABLET, FILM COATED ORAL DAILY
Qty: 90 TABLET | Refills: 1 | Status: SHIPPED | OUTPATIENT
Start: 2024-01-10

## 2024-01-10 RX ORDER — VITAMIN B COMPLEX
1 CAPSULE ORAL DAILY
COMMUNITY
Start: 2023-01-04 | End: 2024-01-24

## 2024-01-10 RX ORDER — LOSARTAN POTASSIUM 50 MG/1
50 TABLET ORAL DAILY
Qty: 30 TABLET | Refills: 5 | Status: SHIPPED | OUTPATIENT
Start: 2024-01-10

## 2024-01-10 NOTE — PROGRESS NOTES
ADULT ANNUAL PHYSICAL  [facility]    NAME: Neptali Elias  AGE: 58 y.o. SEX: male  : 1965     DATE: 2024     Assessment and Plan:     Problem List Items Addressed This Visit       Essential hypertension     -last visit prescribed bp kit patient hasn't picked up; will go to pharmacy to   -today bp 158/90 and elevated not at goal of <140/90 per [facility] 8  -repeat manual 150/90  -home meds: cozaar 25 and amlodipine 10 both of which were taken today around 8 am  -history of LOUIE not on CPAP machine past year or two due to recall: may be contributing to high bp    PLAN  -increase cozaar from 25mg to 50mg  -staff message sent to f/u in one month as well as to f/u with sleep medicine for another cpap machine  -recommended bp diary and patient said will  bp kit at pharmacy  -consider repeat bmp to check for electrolyte abnormalities (hyperkalemia) with change in ARB dose at time of f/u         Relevant Medications    losartan (Cozaar) 50 mg tablet    LOUIE (obstructive sleep apnea)     -patient reported his CPAP machine recalled, hasn't used it in about 1 to 2 years    PLAN  -sent message to have staff call and encourage sleep medicine appointment to use CPAP          Mixed hyperlipidemia     Lab Results   Component Value Date    CHOLESTEROL 233 (H) 2023    CHOLESTEROL 193 2021    CHOLESTEROL 272 (H) 10/19/2020     Lab Results   Component Value Date    HDL 60 2023    HDL 45 2021    HDL 38 (L) 10/19/2020     Lab Results   Component Value Date    TRIG 243 (H) 2023    TRIG 80 2021    TRIG 183 (H) 10/19/2020     Lab Results   Component Value Date    NONHDLC 148 2021    NONHDLC 234 10/19/2020     Recently we started him on atorvastatin 40mg (2023)    PLAN  -repeat lipid panel in 2024. Can consider repeat in March which is about 12 weeks post starting statin         Relevant  Medications    atorvastatin (LIPITOR) 40 mg tablet    Mood changes     PHQ-2/9 Depression Screening    Little interest or pleasure in doing things: 3 - nearly every day  Feeling down, depressed, or hopeless: 1 - several days  Trouble falling or staying asleep, or sleeping too much: 3 - nearly every day  Feeling tired or having little energy: 2 - more than half the days  Poor appetite or overeating: 3 - nearly every day  Feeling bad about yourself - or that you are a failure or have let yourself or your family down: 0 - not at all  Trouble concentrating on things, such as reading the newspaper or watching television: 0 - not at all  Moving or speaking so slowly that other people could have noticed. Or the opposite - being so fidgety or restless that you have been moving around a lot more than usual: 0 - not at all  Thoughts that you would be better off dead, or of hurting yourself in some way: 0 - not at all  PHQ-2 Score: 4  PHQ-2 Interpretation: POSITIVE depression screen  PHQ-9 Score: 12  PHQ-9 Interpretation: Moderate depression       -no SI/HI/AVH, no active suicidal ideation or plan  -states no hx of therapy or medication use  -prior history of verbal abuse to staff; may be related to depression or may be separate: last few visits without issue. Needs further exploration.    PLAN  -therapy resources provided in AVS   -f/u at next office visit          Sliding hiatal hernia     -noted on EGD performed 12/2020  -home med:nexium 20mg    PLAN  -continue home med         External hemorrhoids     -noted on colonoscopy performed in 12/2020  -without symptoms    PLAN  -nothing at this time         Healthcare maintenance     -last colonoscopy 12/2020; 5 polyps removed all tubular adenoma negative for high grade dysplasia and carcinoma; GI recommended repeat colonoscopy in 3 years, that would have been 10/2023 and as such due for repeat colonoscopy  -lung cancer screening- not needed (one or two cigarettes a day for aout  10 years, quit 10 years ago)   -prostate cancer-(55-69 shared decision making) previously screened with PSA in 11/2023 per patient request and normal   -AAA screening - at age 65-75 per [facility]         Diverticulosis     -of sigmoid colon found on colonoscopy performed 10/2020    PLAN  -on next visit emphasize importance of healthy diet with lots of fiber from sources like vegetables         BMI 32.0-32.9,adult     -patient reports 10 years of attempts to lose weight unsuccessful; has had some weight loss and continues to struggle  -no formal exercise  -healthy diet  -no family or personal history of thyroid cancer/disorders or pancreatitis    PLAN  -wegovy 0.5mg weekly per patient request; discussed side effects  -staff message sent to f/u in one month          Relevant Medications    Semaglutide-Weight Management (WEGOVY) 0.5 MG/0.5ML    Allergies     -noted on physical exam  -likes to sleep in cold room  -worse in the morning with sneezing then improves in the afternoon even if at home    PLAN  -flonase  -pay attention to triggers will discuss on f/u          Relevant Medications    fluticasone (FLONASE) 50 mcg/act nasal spray     Other Visit Diagnoses       Annual physical exam    -  Primary    Relevant Orders    CBC and differential    Comprehensive metabolic panel    UA w Reflex to Microscopic w Reflex to Culture    Ambulatory Referral to Gastroenterology    Encounter for immunization        Relevant Orders    influenza vaccine, quadrivalent, 0.5 mL, preservative-free, for adult and pediatric patients 6 mos+ (AFLURIA, FLUARIX, FLULAVAL, FLUZONE) (Completed)            Immunizations and preventive care screenings were discussed with patient today. Appropriate education was printed on patient's after visit summary.    Screened PSA November 2023 per patient request and normal. (0.32) Concerns include father and brother with history of prostate cancer    Counseling:  Alcohol/drug use: discussed moderation in  alcohol intake, the recommendations for healthy alcohol use, and avoidance of illicit drug use.  Dental Health: discussed importance of regular tooth brushing, flossing, and dental visits.  Injury prevention: discussed safety/seat belts, safety helmets, smoke detectors, carbon dioxide detectors, and smoking near bedding or upholstery.  Sexual health: discussed sexually transmitted diseases, partner selection, use of condoms, avoidance of unintended pregnancy, and contraceptive alternatives.  Exercise: the importance of regular exercise/physical activity was discussed. Recommend exercise 3-5 times per week for at least 30 minutes.     BMI Counseling: Body mass index is 32 kg/m². The BMI is above normal. Nutrition recommendations include decreasing portion sizes, encouraging healthy choices of fruits and vegetables, decreasing fast food intake, consuming healthier snacks, limiting drinks that contain sugar, moderation in carbohydrate intake, increasing intake of lean protein, reducing intake of saturated and trans fat and reducing intake of cholesterol. Exercise recommendations include exercising 3-5 times per week. Pharmacotherapy was ordered to help aid in weight loss. Rationale for BMI follow-up plan is due to patient being overweight or obese.     Depression Screening and Follow-up Plan: Patient's depression screening was positive with a PHQ-2 score of 4. Their PHQ-9 score was 12. Patient declines further evaluation by mental health professional and/or medications. Brief counseling provided. Will re-evaluate at next office visit.         No follow-ups on file.     Chief Complaint:     Chief Complaint   Patient presents with    Annual Exam     Requested flu shot      History of Present Illness:     Adult Annual Physical   Patient here for a comprehensive physical exam. The patient reports problems - wants to talk about weight  .    Diet and Physical Activity  Diet/Nutrition: well balanced diet, limited junk food,  low fat diet, consuming 3-5 servings of fruits/vegetables daily, adequate fiber intake, and adequate whole grain intake.   Exercise:  once a week. Doesn't walk a lot. .      Depression Screening  PHQ-2/9 Depression Screening    Little interest or pleasure in doing things: 3 - nearly every day  Feeling down, depressed, or hopeless: 1 - several days  Trouble falling or staying asleep, or sleeping too much: 3 - nearly every day  Feeling tired or having little energy: 2 - more than half the days  Poor appetite or overeating: 3 - nearly every day  Feeling bad about yourself - or that you are a failure or have let yourself or your family down: 0 - not at all  Trouble concentrating on things, such as reading the newspaper or watching television: 0 - not at all  Moving or speaking so slowly that other people could have noticed. Or the opposite - being so fidgety or restless that you have been moving around a lot more than usual: 0 - not at all  Thoughts that you would be better off dead, or of hurting yourself in some way: 0 - not at all  PHQ-2 Score: 4  PHQ-2 Interpretation: POSITIVE depression screen  PHQ-9 Score: 12  PHQ-9 Interpretation: Moderate depression       General Health  Sleep: sleeps poorly, gets 4-6 hours of sleep on average, snores loudly, experiences daytime hypersomnolence, unrefreshing sleep, witnessed apnea, witnessed gasping, and history of sleep apnea and sleep stdies. Had CPAP mnachine and they recalled it and then stopped using it. That was one or two years ago.  .   Hearing: normal - bilateral.  Vision: most recent eye exam >1 year ago and wears glasses.   Dental: no dental visits for >1 year, brushes teeth twice daily, and flosses teeth occasionally.        Health  Symptoms include: nocturia    Advanced Care Planning  Do you have an advanced directive? no  Do you have a durable medical power of ? no     Review of Systems:     Review of Systems   Constitutional:  Negative for fever.   HENT:   Negative for sinus pressure.    Respiratory:  Negative for cough, chest tightness, shortness of breath and wheezing.    Cardiovascular:  Negative for chest pain, palpitations and leg swelling.   Gastrointestinal:  Negative for abdominal distention, abdominal pain, constipation, diarrhea, nausea, rectal pain and vomiting.   Hematological:  Does not bruise/bleed easily.   Psychiatric/Behavioral:  Negative for suicidal ideas.       Past Medical History:     Past Medical History:   Diagnosis Date    Colon polyp     GERD (gastroesophageal reflux disease)     Hyperlipidemia     Hypertension     Sleep apnea       Past Surgical History:     Past Surgical History:   Procedure Laterality Date    ACHILLES TENDON REPAIR Right     1983 in [place]    BACK SURGERY  2009    herniated L5-S1 disc repair    COLONOSCOPY        Family History:     Family History   Problem Relation Age of Onset    Hypertension Mother     Hyperlipidemia Mother     Prostate cancer Father     Hyperlipidemia Father     Stroke Father     Diabetes type II Brother     Hypertension Brother     Prostate cancer Brother       Social History:     Social History     Socioeconomic History    Marital status: Single     Spouse name: None    Number of children: None    Years of education: None    Highest education level: None   Occupational History    None   Tobacco Use    Smoking status: Former     Current packs/day: 0.00     Types: Cigars, Cigarettes     Quit date: 7/28/2020     Years since quitting: 3.4    Smokeless tobacco: Never   Vaping Use    Vaping status: Never Used   Substance and Sexual Activity    Alcohol use: Not Currently     Comment: used to drink 2-3x/week 1 bottle of wine    Drug use: Yes     Types: Marijuana     Comment: THC 3 times a week    Sexual activity: Yes     Partners: Female     Comment: same for 25 years   Other Topics Concern    None   Social History Narrative    2024    Lives in apartment    No pets     Lives with wife and father      Social  "Determinants of Health     Financial Resource Strain: Low Risk  (12/20/2023)    Overall Financial Resource Strain (CARDIA)     Difficulty of Paying Living Expenses: Not hard at all   Food Insecurity: No Food Insecurity (12/20/2023)    Hunger Vital Sign     Worried About Running Out of Food in the Last Year: Never true     Ran Out of Food in the Last Year: Never true   Transportation Needs: No Transportation Needs (11/14/2023)    PRAPARE - Transportation     Lack of Transportation (Medical): No     Lack of Transportation (Non-Medical): No   Physical Activity: Not on file   Stress: Not on file   Social Connections: Not on file   Intimate Partner Violence: Not on file   Housing Stability: Not on file      Current Medications:     Current Outpatient Medications   Medication Sig Dispense Refill    amLODIPine (NORVASC) 10 mg tablet Take 1 tablet (10 mg total) by mouth daily 90 tablet 1    atorvastatin (LIPITOR) 40 mg tablet Take 1 tablet (40 mg total) by mouth daily 90 tablet 1    b complex vitamins capsule Take 1 capsule by mouth daily      esomeprazole (NexIUM) 20 mg capsule Take 1 capsule (20 mg total) by mouth daily in the early morning 30 capsule 1    fluticasone (FLONASE) 50 mcg/act nasal spray 1 spray into each nostril daily 16 g 0    losartan (COZAAR) 25 mg tablet Take 1 tablet (25 mg total) by mouth daily for 180 doses 90 tablet 1    losartan (Cozaar) 50 mg tablet Take 1 tablet (50 mg total) by mouth daily 30 tablet 5    Semaglutide-Weight Management (WEGOVY) 0.5 MG/0.5ML Inject 0.5 mL (0.5 mg total) under the skin once a week 2.5 mL 0    Blood Pressure KIT Use 2 (two) times a day 1 kit 0     No current facility-administered medications for this visit.      Allergies:     No Known Allergies   Physical Exam:     /90 (BP Location: Left arm, Patient Position: Sitting, Cuff Size: Large)   Pulse 67   Temp 97.6 °F (36.4 °C) (Temporal)   Resp 18   Ht 5' 10\" (1.778 m)   Wt 101 kg (223 lb)   SpO2 98%   BMI " 32.00 kg/m²     Physical Exam  Vitals and nursing note reviewed.   Constitutional:       General: He is not in acute distress.     Appearance: Normal appearance. He is well-developed. He is obese.   HENT:      Head: Normocephalic and atraumatic.      Right Ear: Ear canal and external ear normal.      Left Ear: Ear canal and external ear normal.      Ears:      Comments: Serous fluid behind bilateral TMs     Nose: No congestion or rhinorrhea.      Comments: Mild erythema and edema      Mouth/Throat:      Mouth: Mucous membranes are moist.      Pharynx: Oropharynx is clear.   Eyes:      General: No scleral icterus.        Right eye: No discharge.         Left eye: No discharge.      Extraocular Movements: Extraocular movements intact.      Conjunctiva/sclera: Conjunctivae normal.      Pupils: Pupils are equal, round, and reactive to light.      Comments: bilateral conjunctivitis   Neck:      Vascular: No carotid bruit.   Cardiovascular:      Rate and Rhythm: Normal rate and regular rhythm.      Pulses: Normal pulses.      Heart sounds: Normal heart sounds. No murmur heard.     No friction rub. No gallop.   Pulmonary:      Effort: Pulmonary effort is normal.      Breath sounds: Normal breath sounds. No wheezing, rhonchi or rales.   Abdominal:      General: Abdomen is flat. Bowel sounds are normal. There is no distension.      Palpations: Abdomen is soft. There is no mass.      Tenderness: There is no abdominal tenderness.      Hernia: No hernia is present.      Comments: No aortic or renal bruit   Musculoskeletal:         General: No swelling. Normal range of motion.      Cervical back: Normal range of motion and neck supple. No rigidity or tenderness.      Right lower leg: No edema.      Left lower leg: No edema.   Lymphadenopathy:      Cervical: No cervical adenopathy.   Skin:     General: Skin is warm and dry.      Capillary Refill: Capillary refill takes less than 2 seconds.      Findings: Lesion (scar on right  achilles region from prior surgery) present. No rash.   Neurological:      General: No focal deficit present.      Mental Status: He is alert and oriented to person, place, and time.      Cranial Nerves: No cranial nerve deficit.      Sensory: No sensory deficit.      Motor: No weakness.      Coordination: Coordination normal.      Gait: Gait normal.      Deep Tendon Reflexes: Reflexes normal.   Psychiatric:         Mood and Affect: Mood normal.         Behavior: Behavior normal.         Thought Content: Thought content normal.         Judgment: Judgment normal.          Rebecca Fay Kab-Perlman, MD  [facility]

## 2024-01-10 NOTE — ASSESSMENT & PLAN NOTE
-patient reported his CPAP machine recalled, hasn't used it in about 1 to 2 years    PLAN  -sent message to have staff call and encourage sleep medicine appointment to use CPAP

## 2024-01-10 NOTE — PATIENT INSTRUCTIONS
Wellness Visit for Adults   AMBULATORY CARE:   A wellness visit  is when you see your healthcare provider to get screened for health problems. Your healthcare provider will also give you advice on how to stay healthy. Write down your questions so you remember to ask them. Ask your healthcare provider how often you should have a wellness visit.  What happens at a wellness visit:  Your healthcare provider will ask about your health, and your family history of health problems. This includes high blood pressure, heart disease, and cancer. He or she will ask if you have symptoms that concern you, if you smoke, and about your mood. You may also be asked about your intake of medicines, supplements, food, and alcohol. Any of the following may be done:  Your weight  will be checked. Your height may also be checked so your body mass index (BMI) can be calculated. Your BMI shows if you are at a healthy weight.    Your blood pressure  and heart rate will be checked. Your temperature may also be checked.    Blood and urine tests  may be done. Blood tests may be done to check your cholesterol levels. Abnormal cholesterol levels increase your risk for heart disease and stroke. You may also need a blood or urine test to check for diabetes if you are at increased risk. Urine tests may be done to look for signs of an infection or kidney disease.    A physical exam  includes checking your heartbeat and lungs with a stethoscope. Your healthcare provider may also check your skin to look for sun damage.    Screening tests  may be recommended. A screening test is done to check for diseases that may not cause symptoms. The screening tests you may need depend on your age, gender, family history, and lifestyle habits. For example, colorectal screening may be recommended if you are 50 years old or older.    Screening tests you need if you are a woman:   A Pap smear  is used to screen for cervical cancer. Pap smears are usually done every 3 to  5 years depending on your age. You may need them more often if you have had abnormal Pap smear test results in the past. Ask your healthcare provider how often you should have a Pap smear.    A mammogram  is an x-ray of your breasts to screen for breast cancer. Experts recommend mammograms every 2 years starting at age 50 years. You may need a mammogram at age 49 years or younger if you have an increased risk for breast cancer. Talk to your healthcare provider about when you should start having mammograms and how often you need them.    Vaccines you may need:   Get an influenza vaccine  every year. The influenza vaccine protects you from the flu. Several types of viruses cause the flu. The viruses change over time, so new vaccines are made each year.    Get a tetanus-diphtheria (Td) booster vaccine  every 10 years. This vaccine protects you against tetanus and diphtheria. Tetanus is a severe infection that may cause painful muscle spasms and lockjaw. Diphtheria is a severe bacterial infection that causes a thick covering in the back of your mouth and throat.    Get a human papillomavirus (HPV) vaccine  if you are female and aged 19 to 26 or male 19 to 21 and never received it. This vaccine protects you from HPV infection. HPV is the most common infection spread by sexual contact. HPV may also cause vaginal, penile, and anal cancers.    Get a pneumococcal vaccine  if you are aged 65 years or older. The pneumococcal vaccine is an injection given to protect you from pneumococcal disease. Pneumococcal disease is an infection caused by pneumococcal bacteria. The infection may cause pneumonia, meningitis, or an ear infection.    Get a shingles vaccine  if you are 60 or older, even if you have had shingles before. The shingles vaccine is an injection to protect you from the varicella-zoster virus. This is the same virus that causes chickenpox. Shingles is a painful rash that develops in people who had chickenpox or have  been exposed to the virus.    How to eat healthy:  My Plate is a model for planning healthy meals. It shows the types and amounts of foods that should go on your plate. Fruits and vegetables make up about half of your plate, and grains and protein make up the other half. A serving of dairy is included on the side of your plate. The amount of calories and serving sizes you need depends on your age, gender, weight, and height. Examples of healthy foods are listed below:  Eat a variety of vegetables  such as dark green, red, and orange vegetables. You can also include canned vegetables low in sodium (salt) and frozen vegetables without added butter or sauces.    Eat a variety of fresh fruits , canned fruit in 100% juice, frozen fruit, and dried fruit.    Include whole grains.  At least half of the grains you eat should be whole grains. Examples include whole-wheat bread, wheat pasta, brown rice, and whole-grain cereals such as oatmeal.    Eat a variety of protein foods such as seafood (fish and shellfish), lean meat, and poultry without skin (turkey and chicken). Examples of lean meats include pork leg, shoulder, or tenderloin, and beef round, sirloin, tenderloin, and extra lean ground beef. Other protein foods include eggs and egg substitutes, beans, peas, soy products, nuts, and seeds.    Choose low-fat dairy products such as skim or 1% milk or low-fat yogurt, cheese, and cottage cheese.    Limit unhealthy fats  such as butter, hard margarine, and shortening.       Exercise:  Exercise at least 30 minutes per day on most days of the week. Some examples of exercise include walking, biking, dancing, and swimming. You can also fit in more physical activity by taking the stairs instead of the elevator or parking farther away from stores. Include muscle strengthening activities 2 days each week. Regular exercise provides many health benefits. It helps you manage your weight, and decreases your risk for type 2 diabetes,  heart disease, stroke, and high blood pressure. Exercise can also help improve your mood. Ask your healthcare provider about the best exercise plan for you.       General health and safety guidelines:   Do not smoke.  Nicotine and other chemicals in cigarettes and cigars can cause lung damage. Ask your healthcare provider for information if you currently smoke and need help to quit. E-cigarettes or smokeless tobacco still contain nicotine. Talk to your healthcare provider before you use these products.    Limit alcohol.  A drink of alcohol is 12 ounces of beer, 5 ounces of wine, or 1½ ounces of liquor.    Lose weight, if needed.  Being overweight increases your risk of certain health conditions. These include heart disease, high blood pressure, type 2 diabetes, and certain types of cancer.    Protect your skin.  Do not sunbathe or use tanning beds. Use sunscreen with a SPF 15 or higher. Apply sunscreen at least 15 minutes before you go outside. Reapply sunscreen every 2 hours. Wear protective clothing, hats, and sunglasses when you are outside.    Drive safely.  Always wear your seatbelt. Make sure everyone in your car wears a seatbelt. A seatbelt can save your life if you are in an accident. Do not use your cell phone when you are driving. This could distract you and cause an accident. Pull over if you need to make a call or send a text message.    Practice safe sex.  Use latex condoms if are sexually active and have more than one partner. Your healthcare provider may recommend screening tests for sexually transmitted infections (STIs).    Wear helmets, lifejackets, and protective gear.  Always wear a helmet when you ride a bike or motorcycle, go skiing, or play sports that could cause a head injury. Wear protective equipment when you play sports. Wear a lifejacket when you are on a boat or doing water sports.    © Copyright Merative 2023 Information is for End User's use only and may not be sold, redistributed or  otherwise used for commercial purposes.  The above information is an  only. It is not intended as medical advice for individual conditions or treatments. Talk to your doctor, nurse or pharmacist before following any medical regimen to see if it is safe and effective for you.    Outpatient Mental Health Resources     [place] Suicide Prevention Lifeline: Dial #795  If you prefer to text, you can reach the Crisis Text Line by texting “[place]” to 827580    ¿Te encuentras en crisis? Envía un mensaje de texto con la palabra AYUDA al [id number] para comunicarte de manera gratuita con un Consejero de Crisis  Apoyo gratuito las 24 horas del día, los 7 días de la semana, al alcance de tu mano.    [facility] CRISIS for mental health emergencies and/or please go to your local Emergency Department Call [phone] if you or a loved one are in a mental health crisis.  You can Visit https://www.[facility].gov/Services/Mental-Health-In-[place]/Documents/Suicide_Prevention_Hotlines.pdf to find 24/7 crisis phone line for other [facility].    [facility]   ? Location 1: [address] [place] [address] [phone]   ? Location 2: [address] [place] [address] [phone]        ? English only* Serves ages 4+          ? Accepts Medicare and some commercial plans    [facility]   ? [address] [place] [address] [phone]; [phone]  ? Bilingual English/Tanzanian Serves ages 5+   ? Accepts Medical Assistance     [facility]   ? [address] [place] [address] [phone]   ? Bilingual English/Tanzanian Serves ages 14+   ? Accepts Medical Assistance, (Not currently accepting Medicare), & Major Commercial Insurances     [facility]   ? [address] [place] [address] [phone]        ? Bilingual English/Tanzanian Serves ages 6+   ? Accepts Medical Assistance & Commercial Insurance     [facility]   ? Previous [facility]  location is closed  ? [address], [address] [phone]   ?  Bilingual English/Montserratian Serves ages 3+   ? Accepts Medical Assistance & Some Commercial Insurance     [facility]   ? [address] [place] [address] [phone]   ? Bilingual English/[place]erratian Serves ages 5+   ? Accepts Medical Assistance     [facility]   ? [address] [place] [address] [phone]  ? Bilingual English/Montserratian Serves ages 3+   ? Accepts Medical Assistance & Some Commercial Insurance     PREVENTIVE MEASURES   ? Location 1: [address] [place] [address] [phone]        ? Location 2: [address] [place] [address]   ? Bilingual English/Montserratian Serves ages 18+  ? Accepts Medical Assistance    [facility]  ? [address], [place] [address] [phone]  ? Bilingual English/Montserratian  ? Accepts Aetna, Cigna, [facility]um[facility], [facility], [employer], Medicare, [employer]/[facility], [facility]. No Medical Assistance    [facility] ([phone])  Medicare/Medicare Advantage, Medical Assistance/HealthChoices, Commercial, and self-pay as payment.    TEEN HELP LINE  ? 8-108-367-[phone]    [facility] [place]         ? [phone]       ? 24/7 [facility] Hotline    [facility]         ? [phone]       ? 24/7 Advocate Hotline    www.psychologytoday.Fusion Dynamic is a resource to find psychotherapy providers, patients can filter therapist search list based on a number of criteria including language, specialty, gender, insurance, etc. Individuals seeking will need to reach out to perspective providers through information in the directory. You are encouraged to contact multiple providers to given that many providers have a significant wait list for services as well as to find a provider is a good fit for you!    [facility] is an organization of families, friends and individuals whose lives have been affected by mental illness. Together, we advocate for better lives for  those individuals who have a m)ental illness. Visit: cami-.org to learn more or to search for local support resources including qualified mental health providers.

## 2024-01-10 NOTE — ASSESSMENT & PLAN NOTE
-patient reports 10 years of attempts to lose weight unsuccessful; has had some weight loss and continues to struggle  -no formal exercise  -healthy diet  -no family or personal history of thyroid cancer/disorders or pancreatitis    PLAN  -wegovy 0.5mg weekly per patient request; discussed side effects  -staff message sent to f/u in one month

## 2024-01-10 NOTE — ASSESSMENT & PLAN NOTE
-last colonoscopy 12/2020; 5 polyps removed all tubular adenoma negative for high grade dysplasia and carcinoma; GI recommended repeat colonoscopy in 3 years, that would have been 10/2023 and as such due for repeat colonoscopy  -lung cancer screening- not needed (one or two cigarettes a day for aout 10 years, quit 10 years ago)   -prostate cancer-(55-69 shared decision making) previously screened with PSA in 11/2023 per patient request and normal   -AAA screening - at age 65-75 per uspstf

## 2024-01-10 NOTE — ASSESSMENT & PLAN NOTE
-last visit prescribed bp kit patient hasn't picked up; will go to pharmacy to   -today bp 158/90 and elevated not at goal of <140/90 per [facility] 8  -repeat manual 150/90  -home meds: cozaar 25 and amlodipine 10 both of which were taken today around 8 am  -history of LOUIE not on CPAP machine past year or two due to recall: may be contributing to high bp    PLAN  -increase cozaar from 25mg to 50mg  -staff message sent to f/u in one month as well as to f/u with sleep medicine for another cpap machine  -recommended bp diary and patient said will  bp kit at pharmacy  -consider repeat bmp to check for electrolyte abnormalities (hyperkalemia) with change in ARB dose at time of f/u

## 2024-01-10 NOTE — ASSESSMENT & PLAN NOTE
Lab Results   Component Value Date    CHOLESTEROL 233 (H) 11/14/2023    CHOLESTEROL 193 05/28/2021    CHOLESTEROL 272 (H) 10/19/2020     Lab Results   Component Value Date    HDL 60 11/14/2023    HDL 45 05/28/2021    HDL 38 (L) 10/19/2020     Lab Results   Component Value Date    TRIG 243 (H) 11/14/2023    TRIG 80 05/28/2021    TRIG 183 (H) 10/19/2020     Lab Results   Component Value Date    NONHDLC 148 05/28/2021    NONHDLC 234 10/19/2020     Recently we started him on atorvastatin 40mg (11/2023)    PLAN  -repeat lipid panel in November 2024. Can consider repeat in March which is about 12 weeks post starting statin

## 2024-01-10 NOTE — ASSESSMENT & PLAN NOTE
-noted on physical exam  -likes to sleep in cold room  -worse in the morning with sneezing then improves in the afternoon even if at home    PLAN  -flonase  -pay attention to triggers will discuss on f/u

## 2024-01-11 ENCOUNTER — OFFICE VISIT (OUTPATIENT)
Dept: OBGYN CLINIC | Facility: MEDICAL CENTER | Age: 59
End: 2024-01-11
Payer: COMMERCIAL

## 2024-01-11 ENCOUNTER — APPOINTMENT (OUTPATIENT)
Dept: RADIOLOGY | Facility: MEDICAL CENTER | Age: 59
End: 2024-01-11
Payer: COMMERCIAL

## 2024-01-11 VITALS
BODY MASS INDEX: 31.92 KG/M2 | HEIGHT: 70 IN | SYSTOLIC BLOOD PRESSURE: 163 MMHG | WEIGHT: 223 LBS | DIASTOLIC BLOOD PRESSURE: 81 MMHG | HEART RATE: 64 BPM

## 2024-01-11 DIAGNOSIS — M25.562 CHRONIC PAIN OF LEFT KNEE: ICD-10-CM

## 2024-01-11 DIAGNOSIS — G89.29 CHRONIC BILATERAL LOW BACK PAIN WITHOUT SCIATICA: ICD-10-CM

## 2024-01-11 DIAGNOSIS — M16.12 PRIMARY OSTEOARTHRITIS OF ONE HIP, LEFT: ICD-10-CM

## 2024-01-11 DIAGNOSIS — M17.12 PRIMARY OSTEOARTHRITIS OF LEFT KNEE: ICD-10-CM

## 2024-01-11 DIAGNOSIS — M25.552 PAIN IN LEFT HIP: Primary | ICD-10-CM

## 2024-01-11 DIAGNOSIS — M54.50 CHRONIC BILATERAL LOW BACK PAIN WITHOUT SCIATICA: ICD-10-CM

## 2024-01-11 DIAGNOSIS — G89.29 CHRONIC PAIN OF LEFT KNEE: ICD-10-CM

## 2024-01-11 DIAGNOSIS — M51.36 LUMBAR DEGENERATIVE DISC DISEASE: ICD-10-CM

## 2024-01-11 PROCEDURE — 20610 DRAIN/INJ JOINT/BURSA W/O US: CPT | Performed by: EMERGENCY MEDICINE

## 2024-01-11 PROCEDURE — 99214 OFFICE O/P EST MOD 30 MIN: CPT | Performed by: EMERGENCY MEDICINE

## 2024-01-11 PROCEDURE — 73564 X-RAY EXAM KNEE 4 OR MORE: CPT

## 2024-01-11 RX ORDER — LIDOCAINE HYDROCHLORIDE 10 MG/ML
4 INJECTION, SOLUTION INFILTRATION; PERINEURAL
Status: COMPLETED | OUTPATIENT
Start: 2024-01-11 | End: 2024-01-11

## 2024-01-11 RX ORDER — TRIAMCINOLONE ACETONIDE 40 MG/ML
40 INJECTION, SUSPENSION INTRA-ARTICULAR; INTRAMUSCULAR
Status: COMPLETED | OUTPATIENT
Start: 2024-01-11 | End: 2024-01-11

## 2024-01-11 RX ADMIN — TRIAMCINOLONE ACETONIDE 40 MG: 40 INJECTION, SUSPENSION INTRA-ARTICULAR; INTRAMUSCULAR at 09:15

## 2024-01-11 RX ADMIN — LIDOCAINE HYDROCHLORIDE 4 ML: 10 INJECTION, SOLUTION INFILTRATION; PERINEURAL at 09:15

## 2024-01-11 NOTE — ASSESSMENT & PLAN NOTE
-of sigmoid colon found on colonoscopy performed 10/2020    PLAN  -on next visit emphasize importance of healthy diet with lots of fiber from sources like vegetables

## 2024-01-11 NOTE — PROGRESS NOTES
Assessment/Plan:    Diagnoses and all orders for this visit:    Pain in left hip  -     Ambulatory Referral to Orthopedic Surgery; Future    Primary osteoarthritis of one hip, left  -     Ambulatory Referral to Orthopedic Surgery; Future    Lumbar degenerative disc disease    Chronic bilateral low back pain without sciatica    Chronic pain of left knee  -     XR knee 4+ vw left injury; Future  -     Large joint arthrocentesis: L knee    Primary osteoarthritis of left knee  -     Large joint arthrocentesis: L knee    Neptali with partial benefit from fluoroscopic guided corticosteroid injection of the left hip.  However he would like to speak with orthopedic surgery to discuss surgical options  Patient has held off on formal physical therapy for his hip and back due to time constraints  Due to continued left lateral knee pain we have provided a corticosteroid injection for diagnostic and therapeutic purposes as the x-ray of the left knee obtained today does show osteoarthritis.  If there is no improvement pain possibly from lower back.      No follow-ups on file.      Subjective:   Patient ID: Neptali Elias is a 58 y.o. male.    Patient returns status post fluoroscopic guided left hip corticosteroid injection with pain management with 50% improvement.      Review of Systems    The following portions of the patient's chart were reviewed and updated as appropriate:   Allergy:  No Known Allergies    Medications:    Current Outpatient Medications:     amLODIPine (NORVASC) 10 mg tablet, Take 1 tablet (10 mg total) by mouth daily, Disp: 90 tablet, Rfl: 1    atorvastatin (LIPITOR) 40 mg tablet, Take 1 tablet (40 mg total) by mouth daily, Disp: 90 tablet, Rfl: 1    b complex vitamins capsule, Take 1 capsule by mouth daily, Disp: , Rfl:     Blood Pressure KIT, Use 2 (two) times a day, Disp: 1 kit, Rfl: 0    esomeprazole (NexIUM) 20 mg capsule, Take 1 capsule (20 mg total) by mouth daily in the early morning, Disp:  "30 capsule, Rfl: 1    fluticasone (FLONASE) 50 mcg/act nasal spray, 1 spray into each nostril daily, Disp: 16 g, Rfl: 0    losartan (COZAAR) 25 mg tablet, Take 1 tablet (25 mg total) by mouth daily for 180 doses, Disp: 90 tablet, Rfl: 1    losartan (Cozaar) 50 mg tablet, Take 1 tablet (50 mg total) by mouth daily, Disp: 30 tablet, Rfl: 5    Semaglutide-Weight Management (WEGOVY) 0.5 MG/0.5ML, Inject 0.5 mL (0.5 mg total) under the skin once a week, Disp: 2.5 mL, Rfl: 0    Patient Active Problem List   Diagnosis    Essential hypertension    LOUIE (obstructive sleep apnea)    Chronic midline low back pain    Class 2 obesity in adult    Mixed hyperlipidemia    Gastroesophageal reflux disease    Difficulty using continuous positive airway pressure (CPAP) device    Primary osteoarthritis of one hip, left    Mood changes    Nocturia    Extremity numbness    Fatigue    Ulnar neuropathy of both upper extremities    Bilateral carpal tunnel syndrome    Swelling of right hand    Calcification of right carotid artery    Pain in left hip    Sliding hiatal hernia    External hemorrhoids    Healthcare maintenance    Diverticulosis    BMI 32.0-32.9,adult    Allergies       Objective:  /81   Pulse 64   Ht 5' 10\" (1.778 m)   Wt 101 kg (223 lb)   BMI 32.00 kg/m²     Left Knee Exam     Other   Erythema: absent            Physical Exam      Neurologic Exam    Large joint arthrocentesis: L knee  Universal Protocol:  Consent: Verbal consent obtained.  Risks and benefits: risks, benefits and alternatives were discussed  Consent given by: patient  Time out: Immediately prior to procedure a \"time out\" was called to verify the correct patient, procedure, equipment, support staff and site/side marked as required.  Timeout called at: 1/11/2024 9:58 AM.  Patient understanding: patient states understanding of the procedure being performed  Test results: test results available and properly labeled  Site marked: the operative site was " marked  Patient identity confirmed: verbally with patient  Supporting Documentation  Indications: pain   Procedure Details  Location: knee - L knee  Preparation: Patient was prepped and draped in the usual sterile fashion  Needle size: 22 G  Ultrasound guidance: no  Approach: anterolateral  Medications administered: 4 mL lidocaine 1 %; 40 mg triamcinolone acetonide 40 mg/mL    Patient tolerance: patient tolerated the procedure well with no immediate complications  Dressing:  Sterile dressing applied    No erythema of knee(s)          I have personally reviewed pertinent films in PACS and my interpretation is  .            Past Medical History:   Diagnosis Date    Colon polyp     GERD (gastroesophageal reflux disease)     Hyperlipidemia     Hypertension     Sleep apnea        Past Surgical History:   Procedure Laterality Date    ACHILLES TENDON REPAIR Right     1983 in [place]    BACK SURGERY  2009    herniated L5-S1 disc repair    COLONOSCOPY         Social History     Socioeconomic History    Marital status: Single     Spouse name: Not on file    Number of children: Not on file    Years of education: Not on file    Highest education level: Not on file   Occupational History    Not on file   Tobacco Use    Smoking status: Former     Current packs/day: 0.00     Types: Cigars, Cigarettes     Quit date: 7/28/2020     Years since quitting: 3.4    Smokeless tobacco: Never   Vaping Use    Vaping status: Never Used   Substance and Sexual Activity    Alcohol use: Not Currently     Comment: used to drink 2-3x/week 1 bottle of wine    Drug use: Yes     Types: Marijuana     Comment: THC 3 times a week    Sexual activity: Yes     Partners: Female     Comment: same for 25 years   Other Topics Concern    Not on file   Social History Narrative    2024    Lives in apartment    No pets     Lives with wife and father      Social Determinants of Health     Financial Resource Strain: Low Risk  (12/20/2023)    Overall Financial Resource  Strain (CARDIA)     Difficulty of Paying Living Expenses: Not hard at all   Food Insecurity: No Food Insecurity (12/20/2023)    Hunger Vital Sign     Worried About Running Out of Food in the Last Year: Never true     Ran Out of Food in the Last Year: Never true   Transportation Needs: No Transportation Needs (11/14/2023)    PRAPARE - Transportation     Lack of Transportation (Medical): No     Lack of Transportation (Non-Medical): No   Physical Activity: Not on file   Stress: Not on file   Social Connections: Not on file   Intimate Partner Violence: Not on file   Housing Stability: Not on file       Family History   Problem Relation Age of Onset    Hypertension Mother     Hyperlipidemia Mother     Prostate cancer Father     Hyperlipidemia Father     Stroke Father     Diabetes type II Brother     Hypertension Brother     Prostate cancer Brother
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
## 2024-01-12 ENCOUNTER — OFFICE VISIT (OUTPATIENT)
Dept: FAMILY MEDICINE CLINIC | Facility: CLINIC | Age: 59
End: 2024-01-12

## 2024-01-12 ENCOUNTER — APPOINTMENT (OUTPATIENT)
Dept: LAB | Facility: HOSPITAL | Age: 59
End: 2024-01-12
Payer: COMMERCIAL

## 2024-01-12 VITALS
WEIGHT: 223 LBS | HEART RATE: 70 BPM | HEIGHT: 70 IN | DIASTOLIC BLOOD PRESSURE: 64 MMHG | BODY MASS INDEX: 31.92 KG/M2 | OXYGEN SATURATION: 97 % | RESPIRATION RATE: 18 BRPM | TEMPERATURE: 98 F | SYSTOLIC BLOOD PRESSURE: 140 MMHG

## 2024-01-12 DIAGNOSIS — G47.33 OSA (OBSTRUCTIVE SLEEP APNEA): Primary | ICD-10-CM

## 2024-01-12 DIAGNOSIS — I10 ESSENTIAL HYPERTENSION: ICD-10-CM

## 2024-01-12 DIAGNOSIS — Z00.00 ANNUAL PHYSICAL EXAM: ICD-10-CM

## 2024-01-12 LAB
ALBUMIN SERPL BCP-MCNC: 4.9 G/DL (ref 3.5–5)
ALP SERPL-CCNC: 73 U/L (ref 34–104)
ALT SERPL W P-5'-P-CCNC: 19 U/L (ref 7–52)
ANION GAP SERPL CALCULATED.3IONS-SCNC: 7 MMOL/L
AST SERPL W P-5'-P-CCNC: 11 U/L (ref 13–39)
BASOPHILS # BLD AUTO: 0.01 THOUSANDS/ÂΜL (ref 0–0.1)
BASOPHILS NFR BLD AUTO: 0 % (ref 0–1)
BILIRUB SERPL-MCNC: 0.64 MG/DL (ref 0.2–1)
BUN SERPL-MCNC: 15 MG/DL (ref 5–25)
CALCIUM SERPL-MCNC: 10 MG/DL (ref 8.4–10.2)
CHLORIDE SERPL-SCNC: 103 MMOL/L (ref 96–108)
CO2 SERPL-SCNC: 28 MMOL/L (ref 21–32)
CREAT SERPL-MCNC: 0.8 MG/DL (ref 0.6–1.3)
EOSINOPHIL # BLD AUTO: 0.01 THOUSAND/ÂΜL (ref 0–0.61)
EOSINOPHIL NFR BLD AUTO: 0 % (ref 0–6)
ERYTHROCYTE [DISTWIDTH] IN BLOOD BY AUTOMATED COUNT: 14.6 % (ref 11.6–15.1)
GFR SERPL CREATININE-BSD FRML MDRD: 97 ML/MIN/1.73SQ M
GLUCOSE P FAST SERPL-MCNC: 125 MG/DL (ref 65–99)
HCT VFR BLD AUTO: 51.1 % (ref 36.5–49.3)
HGB BLD-MCNC: 16.5 G/DL (ref 12–17)
IMM GRANULOCYTES # BLD AUTO: 0.06 THOUSAND/UL (ref 0–0.2)
IMM GRANULOCYTES NFR BLD AUTO: 0 % (ref 0–2)
LYMPHOCYTES # BLD AUTO: 0.85 THOUSANDS/ÂΜL (ref 0.6–4.47)
LYMPHOCYTES NFR BLD AUTO: 6 % (ref 14–44)
MCH RBC QN AUTO: 26.8 PG (ref 26.8–34.3)
MCHC RBC AUTO-ENTMCNC: 32.3 G/DL (ref 31.4–37.4)
MCV RBC AUTO: 83 FL (ref 82–98)
MONOCYTES # BLD AUTO: 0.69 THOUSAND/ÂΜL (ref 0.17–1.22)
MONOCYTES NFR BLD AUTO: 5 % (ref 4–12)
NEUTROPHILS # BLD AUTO: 11.87 THOUSANDS/ÂΜL (ref 1.85–7.62)
NEUTS SEG NFR BLD AUTO: 89 % (ref 43–75)
NRBC BLD AUTO-RTO: 0 /100 WBCS
PLATELET # BLD AUTO: 356 THOUSANDS/UL (ref 149–390)
PMV BLD AUTO: 9.9 FL (ref 8.9–12.7)
POTASSIUM SERPL-SCNC: 4.4 MMOL/L (ref 3.5–5.3)
PROT SERPL-MCNC: 8.1 G/DL (ref 6.4–8.4)
RBC # BLD AUTO: 6.16 MILLION/UL (ref 3.88–5.62)
SODIUM SERPL-SCNC: 138 MMOL/L (ref 135–147)
WBC # BLD AUTO: 13.49 THOUSAND/UL (ref 4.31–10.16)

## 2024-01-12 PROCEDURE — 36415 COLL VENOUS BLD VENIPUNCTURE: CPT

## 2024-01-12 PROCEDURE — 85025 COMPLETE CBC W/AUTO DIFF WBC: CPT

## 2024-01-12 PROCEDURE — 3077F SYST BP >= 140 MM HG: CPT | Performed by: FAMILY MEDICINE

## 2024-01-12 PROCEDURE — 99213 OFFICE O/P EST LOW 20 MIN: CPT | Performed by: FAMILY MEDICINE

## 2024-01-12 PROCEDURE — 3078F DIAST BP <80 MM HG: CPT | Performed by: FAMILY MEDICINE

## 2024-01-12 PROCEDURE — 80053 COMPREHEN METABOLIC PANEL: CPT

## 2024-01-12 NOTE — PATIENT INSTRUCTIONS
Outpatient Mental Health Resources     [place] Suicide Prevention Lifeline: Dial #079  If you prefer to text, you can reach the Crisis Text Line by texting “[place]” to [phone]    ¿Te encuentras en crisis? Envía un mensaje de texto con la palabra AYUDA al [id number] para comunicarte de manera gratuita con un Consejero de Crisis  Apoyo gratuito las 24 horas del día, los 7 días de la semana, al alcance de tu mano.    [facility] CRISIS for mental health emergencies and/or please go to your local Emergency Department Call [phone] if you or a loved one are in a mental health crisis.  You can Visit https://www.dhs.[place].gov/Services/Mental-Health-In-[place]/Documents/Suicide_Prevention_Hotlines.pdf to find 24/7 crisis phone line for other [facility].    [facility]   ? Location 1: [address] [place] [address] [phone]   ? Location 2: [address] [address] [phone]        ? English only* Serves ages 4+          ? Accepts Medicare and some commercial plans    [facility]   ? [address] [place] [address] [phone]; [phone]  ? Bilingual English/Chadian Serves ages 5+   ? Accepts Medical Assistance     [facility]   ? [address] [place] [address] [phone]   ? Bilingual English/Chadian Serves ages 14+   ? Accepts Medical Assistance, (Not currently accepting Medicare), & Major Commercial Insurances     [facility]   ? [address] [place] [address] [phone]        ? Bilingual English/Chadian Serves ages 6+   ? Accepts Medical Assistance & Commercial Insurance     [facility]   ? Previous [facility] location is closed  ? [address], [address] [phone]   ? Bilingual English/Chadian Serves ages 3+   ? Accepts Medical Assistance & Some Commercial Insurance     [facility]   ? [address] [place] [address] [phone]   ? Bilingual English/Chadian Serves ages 5+   ? Accepts Medical Assistance     [facility]   ? [address] [place] [address] [address] [phone]  ? Bilingual English/Grenadian Serves ages 3+   ? Accepts Medical Assistance & Some Commercial Insurance     PREVENTIVE MEASURES   ? Location 1: [address] [place] [address] [phone]        ? Location 2: [address] [place] [address]   ? Bilingual English/Grenadian Serves ages 18+  ? Accepts Medical Assistance    [facility]  ? [address], [place] [address] [phone]  ? Bilingual English/Grenadian  ? Accepts Aetna, Cigna, [employer] [facility], [facility], [employer], Medicare, [employer], [facility]. No Medical Assistance    [facility] ([phone])  Medicare/Medicare Advantage, Medical Assistance/HealthChoices, Commercial, and self-pay as payment.    TEEN HELP LINE  ? [phone]    CRIME VICTIMS [facility]         ? [phone]       ? 24/7 [facility] Hotline    [facility]         ? [phone]       ? 24/7 Advocate Hotline    www.[facility] is a resource to find psychotherapy providers, patients can filter therapist search list based on a number of criteria including language, specialty, gender, insurance, etc. Individuals seeking will need to reach out to perspective providers through information in the directory. You are encouraged to contact multiple providers to given that many providers have a significant wait list for services as well as to find a provider is a good fit for you!    [facility] is an organization of families, friends and individuals whose lives have been affected by mental illness. Together, we advocate for better lives for those individuals who have a m)ental illness. Visit: [facility].org to learn more or to search for local support resources including qualified mental health providers.

## 2024-01-12 NOTE — PROGRESS NOTES
Name: Neptali Elias      : 1965      MRN: [id number]  Encounter Provider: Rebecca Fay Kab-Perlman, MD  Encounter Date: 2024   Encounter department: [facility]    Assessment & Plan     1. LOUIE (obstructive sleep apnea)  Assessment & Plan:  -patient reported his CPAP machine recalled, hasn't used it in about 1 to 2 years    PLAN  -referral to sleep medicine provided    Orders:  -     Ambulatory Referral to Sleep Medicine; Future    2. BMI 32.0-32.9,adult  Assessment & Plan:  -previously prescribed wegovy 0.5mg weekly however pharmacy out of stock  -no family or personal hx of thyroid cancer/disorders or pancreatitis    PLAN  -patient will reach out once he finds a pharmacy that has GLP-1 agonist in stock and provide with address/number. Recommended contacting through [facility] and requesting me so I can prescribe it  -counseled to limit bread and rice (carbohydrates)      3. Essential hypertension  Assessment & Plan:  -today bp 140/64 and wnl  -picked up bp kit at pharmacy  -changed cozaar from 25 to 50mg two days ago due to uncontrolled hypertension   -home meds: cozaar 50mg and amlodipine 10mg   -may be contributed by LOUIE currently not on cpap machine     PLAN  -keep bp diary and f/u in 3 weeks to recheck bp  -see LOUIE A/P             Subjective      Neptali Elias is a 57 yo male patient who was seen two days ago for an annual physical during which time he requested wegovy for help managing his BMI. Initial plan was to f/u in 1 month however he presents today to f/u regarding wegovy. The pharmacy didn't have it and he would like to have it prescribed again     Review of Systems   Constitutional:  Negative for fatigue and fever.   Eyes:  Negative for visual disturbance.   Respiratory:  Negative for shortness of breath.    Cardiovascular:  Negative for chest pain, palpitations and leg swelling.   Genitourinary:  Negative for frequency and hematuria.  "  Neurological:  Negative for dizziness, light-headedness and headaches.       Current Outpatient Medications on File Prior to Visit   Medication Sig    amLODIPine (NORVASC) 10 mg tablet Take 1 tablet (10 mg total) by mouth daily    atorvastatin (LIPITOR) 40 mg tablet Take 1 tablet (40 mg total) by mouth daily    b complex vitamins capsule Take 1 capsule by mouth daily    Blood Pressure KIT Use 2 (two) times a day    esomeprazole (NexIUM) 20 mg capsule Take 1 capsule (20 mg total) by mouth daily in the early morning    fluticasone (FLONASE) 50 mcg/act nasal spray 1 spray into each nostril daily    losartan (COZAAR) 25 mg tablet Take 1 tablet (25 mg total) by mouth daily for 180 doses    losartan (Cozaar) 50 mg tablet Take 1 tablet (50 mg total) by mouth daily    Semaglutide-Weight Management (WEGOVY) 0.5 MG/0.5ML Inject 0.5 mL (0.5 mg total) under the skin once a week       Objective     /64 (BP Location: Right arm, Patient Position: Sitting, Cuff Size: Standard)   Pulse 70   Temp 98 °F (36.7 °C) (Temporal)   Resp 18   Ht 5' 10\" (1.778 m)   Wt 101 kg (223 lb)   SpO2 97%   BMI 32.00 kg/m²     Physical Exam  Constitutional:       Appearance: Normal appearance. He is obese.   HENT:      Head: Normocephalic and atraumatic.      Right Ear: External ear normal.      Left Ear: External ear normal.      Nose: Nose normal.      Mouth/Throat:      Mouth: Mucous membranes are moist.      Pharynx: Oropharynx is clear.      Comments: Mallampati score of 3+  Eyes:      General: No scleral icterus.        Right eye: No discharge.         Left eye: No discharge.      Extraocular Movements: Extraocular movements intact.      Conjunctiva/sclera: Conjunctivae normal.   Cardiovascular:      Rate and Rhythm: Normal rate and regular rhythm.      Pulses: Normal pulses.      Heart sounds: Normal heart sounds. No murmur heard.     No friction rub. No gallop.   Pulmonary:      Effort: Pulmonary effort is normal.      Breath " sounds: Normal breath sounds. No wheezing, rhonchi or rales.   Abdominal:      General: Abdomen is flat. Bowel sounds are normal. There is no distension.      Palpations: Abdomen is soft.      Tenderness: There is no abdominal tenderness.   Musculoskeletal:         General: Normal range of motion.      Cervical back: Normal range of motion.   Skin:     General: Skin is warm.      Capillary Refill: Capillary refill takes less than 2 seconds.      Findings: No rash.   Neurological:      General: No focal deficit present.      Mental Status: He is alert and oriented to person, place, and time.   Psychiatric:         Mood and Affect: Mood normal.         Behavior: Behavior normal.         Thought Content: Thought content normal.         Judgment: Judgment normal.       Rebecca Fay Kab-Perlman, MD

## 2024-01-14 NOTE — ASSESSMENT & PLAN NOTE
-patient reported his CPAP machine recalled, hasn't used it in about 1 to 2 years    PLAN  -referral to sleep medicine provided

## 2024-01-14 NOTE — ASSESSMENT & PLAN NOTE
-today bp 140/64 and wnl  -picked up bp kit at pharmacy  -changed cozaar from 25 to 50mg two days ago due to uncontrolled hypertension   -home meds: cozaar 50mg and amlodipine 10mg   -may be contributed by [facility] currently not on cpap machine     PLAN  -keep bp diary and f/u in 3 weeks to recheck bp  -see LOUIE A/P

## 2024-01-14 NOTE — ASSESSMENT & PLAN NOTE
-previously prescribed wegovy 0.5mg weekly however pharmacy out of stock  -no family or personal hx of thyroid cancer/disorders or pancreatitis    PLAN  -patient will reach out once he finds a pharmacy that has GLP-1 agonist in stock and provide with address/number. Recommended contacting through [facility] and requesting me so I can prescribe it  -counseled to limit bread and rice (carbohydrates)   Hospitalist

## 2024-01-16 ENCOUNTER — TELEPHONE (OUTPATIENT)
Dept: FAMILY MEDICINE CLINIC | Facility: CLINIC | Age: 59
End: 2024-01-16

## 2024-01-16 NOTE — TELEPHONE ENCOUNTER
Pt [facility] a [facility] [address]    [facility] # [phone]    Pt states the medication was ozempic 0.5 please send to pharmacy     Please advise

## 2024-01-22 ENCOUNTER — OFFICE VISIT (OUTPATIENT)
Dept: FAMILY MEDICINE CLINIC | Facility: CLINIC | Age: 59
End: 2024-01-22

## 2024-01-22 VITALS
DIASTOLIC BLOOD PRESSURE: 104 MMHG | HEART RATE: 66 BPM | OXYGEN SATURATION: 98 % | WEIGHT: 221 LBS | HEIGHT: 70 IN | BODY MASS INDEX: 31.64 KG/M2 | SYSTOLIC BLOOD PRESSURE: 178 MMHG | TEMPERATURE: 97.6 F | RESPIRATION RATE: 21 BRPM

## 2024-01-22 DIAGNOSIS — I10 ESSENTIAL HYPERTENSION: ICD-10-CM

## 2024-01-22 DIAGNOSIS — I63.9 CEREBROVASCULAR ACCIDENT (CVA), UNSPECIFIED MECHANISM (HCC): ICD-10-CM

## 2024-01-22 DIAGNOSIS — F17.200 TOBACCO USE DISORDER: ICD-10-CM

## 2024-01-22 DIAGNOSIS — R45.86 MOOD CHANGES: ICD-10-CM

## 2024-01-22 DIAGNOSIS — G47.33 OSA (OBSTRUCTIVE SLEEP APNEA): ICD-10-CM

## 2024-01-22 DIAGNOSIS — E78.2 MIXED HYPERLIPIDEMIA: ICD-10-CM

## 2024-01-22 DIAGNOSIS — T78.40XS ALLERGY, SEQUELA: ICD-10-CM

## 2024-01-22 DIAGNOSIS — Z76.89 ENCOUNTER FOR SUPPORT AND COORDINATION OF TRANSITION OF CARE: Primary | ICD-10-CM

## 2024-01-22 PROCEDURE — 99495 TRANSJ CARE MGMT MOD F2F 14D: CPT | Performed by: FAMILY MEDICINE

## 2024-01-22 RX ORDER — HYDROCHLOROTHIAZIDE 25 MG/1
25 TABLET ORAL DAILY
Qty: 30 TABLET | Refills: 1 | Status: SHIPPED | OUTPATIENT
Start: 2024-01-22

## 2024-01-22 NOTE — PROGRESS NOTES
Transition of Care  Follow-up After Hospitalization    Neptali Elias 58 y.o. male   Date:  1/23/2024    TCM Call       None          TCM Call       None          Hospital records were reviewed. Medications upon discharge reviewed/updated.   Discharge Disposition:    Follow up visits with other specialists:       Assessment and Plan:    Encounter for support and coordination of transition of care  Counseled on lifestyle changes, healthy diet, exercise as tolerated  Counseled on medication compliance  Encouraged tobacco cessation  Stressed the need to maintain blood pressure at goal  Follow-up with neurology as scheduled  Follow-up patient with cardiology as scheduled      Tobacco use disorder  Counseled patient on tobacco cessation  Nicotine replacement, gum order  Follow-up with PCP    Essential hypertension  /104, elevated above goal less than 130/80.  Patient stable and asymptomatic in office today.    Counseled patient on stress reduction, weight loss, healthy diet low sodium, and exercise as tolerated  Stressed the need to maintain blood pressure at goal  Encourage home BP log daily, present on next visit  Discussed augmenting antihypertensive therapy, will add hydrochlorothiazide 25 mg p.o. daily  Continue on losartan 50 mg p.o. daily and amlodipine 10 mg p.o. daily  Follow-up with PCP in 3 weeks to assess change    LOUIE (obstructive sleep apnea)  Follow-up with sleep medicine for evaluation and continuation of CPAP    Mixed hyperlipidemia  Continue Lipitor 40 mg p.o. daily  Repeat lipid panel, in future    Mood changes  Reports at baseline.  Does not feel depressed but overtaking and worrying since his recent stroke.  Follow-up up with behavioral health therapist, and follow-up with PCP    Cerebrovascular accident (CVA) (HCC)  Recent hospitalization for CVA stroke 1/13/2024.  Status post thrombectomy and stent placement.  Currently on dual antiplatelet therapy.  Doing well overall.  States his  cardiology office called him this morning to discontinue Brilinta and start prasugrel 5 mg p.o. daily.     Plan  Continue dual antiplatelet therapy with aspirin 81 mg p.o. daily and prasugrel 5 mg p.o. daily  Continue high intensity statin Lipitor 40 mg p.o. daily  Counseled on blood pressure control improved diet, and medication compliance.  Encourage tobacco cessation, NRT order  Follow-up with neurology as scheduled  Follow-up patient with cardiology as scheduled  Follow-up with weight management, recently started on Wegovy 0.5 mg subQ weekly          HPI:  HPI  Patient is a 58 years old male who presents to the office for follow-up visit for transition of care.  Patient was recently hospitalized 01/13/2024 for right ICA occlusion stroke.  He received tPA and underwent thrombectomy.  Patient was started on dual antiplatelet therapy.  Today he reports doing well.  Patient blood pressure is elevated in office.  He is asymptomatic and denies headache, chest pain, shortness of breath, palpitations, visual changes, abdominal pain, N/V.      ROS: Review of Systems   Constitutional:  Negative for chills and fever.   HENT:  Negative for ear pain and sore throat.    Eyes:  Negative for pain and visual disturbance.   Respiratory:  Negative for cough, chest tightness, shortness of breath and wheezing.    Cardiovascular:  Negative for chest pain, palpitations and leg swelling.   Gastrointestinal:  Negative for abdominal pain, blood in stool, constipation, diarrhea, nausea and vomiting.   Genitourinary:  Negative for dysuria, flank pain, frequency and hematuria.   Musculoskeletal:  Negative for arthralgias and back pain.   Skin:  Negative for color change and rash.   Neurological:  Negative for dizziness, seizures, syncope, weakness and headaches.   Psychiatric/Behavioral:  Negative for sleep disturbance and suicidal ideas.    All other systems reviewed and are negative.      Past Medical History:   Diagnosis Date    Colon  polyp     GERD (gastroesophageal reflux disease)     Hyperlipidemia     Hypertension     Sleep apnea        Past Surgical History:   Procedure Laterality Date    ACHILLES TENDON REPAIR Right     1983 in [place]    BACK SURGERY  2009    herniated L5-S1 disc repair    COLONOSCOPY         Social History     Socioeconomic History    Marital status: Single     Spouse name: None    Number of children: None    Years of education: None    Highest education level: None   Occupational History    None   Tobacco Use    Smoking status: Former     Current packs/day: 0.00     Types: Cigars, Cigarettes     Quit date: 7/28/2020     Years since quitting: 3.4    Smokeless tobacco: Never   Vaping Use    Vaping status: Never Used   Substance and Sexual Activity    Alcohol use: Not Currently     Comment: used to drink 2-3x/week 1 bottle of wine    Drug use: Yes     Types: Marijuana     Comment: THC 3 times a week    Sexual activity: Yes     Partners: Female     Comment: same for 25 years   Other Topics Concern    None   Social History Narrative    2024    Lives in apartment    No pets     Lives with wife and father      Social Determinants of Health     Financial Resource Strain: Low Risk  (1/16/2024)    Received from [facility]    Overall Financial Resource Strain (CARDIA)     Difficulty of Paying Living Expenses: Not hard at all   Food Insecurity: No Food Insecurity (1/16/2024)    Received from [facility]    Hunger Vital Sign     Worried About Running Out of Food in the Last Year: Never true     Ran Out of Food in the Last Year: Never true   Transportation Needs: No Transportation Needs (1/16/2024)    Received from [facility]    PRAPARE - Transportation     Lack of Transportation (Medical): No     Lack of Transportation (Non-Medical): No   Physical Activity: Not on file   Stress: Not on file   Social Connections: Not on file   Intimate Partner Violence: Not At Risk (1/16/2024)     Received from [facility]    Humiliation, Afraid, Rape, and Kick questionnaire     Fear of Current or Ex-Partner: No     Emotionally Abused: No     Physically Abused: No     Sexually Abused: No   Housing Stability: Low Risk  (1/16/2024)    Received from [facility]    Housing Stability Vital Sign     Unable to Pay for Housing in the Last Year: No     Number of Places Lived in the Last Year: 1     Unstable Housing in the Last Year: No       Family History   Problem Relation Age of Onset    Hypertension Mother     Hyperlipidemia Mother     Prostate cancer Father     Hyperlipidemia Father     Stroke Father     Diabetes type II Brother     Hypertension Brother     Prostate cancer Brother        No Known Allergies      Current Outpatient Medications:     aspirin (ECOTRIN LOW STRENGTH) 81 mg EC tablet, Take 81 mg by mouth daily, Disp: , Rfl:     hydrochlorothiazide (HYDRODIURIL) 25 mg tablet, Take 1 tablet (25 mg total) by mouth daily, Disp: 30 tablet, Rfl: 1    metoprolol tartrate (LOPRESSOR) 25 mg tablet, Take 12.5 mg by mouth 2 (two) times a day, Disp: , Rfl:     nicotine polacrilex (NICORETTE) 4 mg gum, Chew 1 each (4 mg total) as needed for smoking cessation, Disp: 100 each, Rfl: 0    prasugrel (EFFIENT) tablet, Take 5 mg by mouth daily, Disp: , Rfl:     amLODIPine (NORVASC) 10 mg tablet, Take 1 tablet (10 mg total) by mouth daily, Disp: 90 tablet, Rfl: 1    atorvastatin (LIPITOR) 40 mg tablet, Take 1 tablet (40 mg total) by mouth daily, Disp: 90 tablet, Rfl: 1    b complex vitamins capsule, Take 1 capsule by mouth daily, Disp: , Rfl:     Blood Pressure KIT, Use 2 (two) times a day, Disp: 1 kit, Rfl: 0    esomeprazole (NexIUM) 20 mg capsule, Take 1 capsule (20 mg total) by mouth daily in the early morning, Disp: 30 capsule, Rfl: 1    fluticasone (FLONASE) 50 mcg/act nasal spray, 1 spray into each nostril daily, Disp: 16 g, Rfl: 0    losartan (COZAAR) 25 mg tablet, Take 1 tablet (25  "mg total) by mouth daily for 180 doses, Disp: 90 tablet, Rfl: 1    losartan (Cozaar) 50 mg tablet, Take 1 tablet (50 mg total) by mouth daily, Disp: 30 tablet, Rfl: 5    Semaglutide-Weight Management (WEGOVY) 0.5 MG/0.5ML, Inject 0.5 mL (0.5 mg total) under the skin once a week, Disp: 2.5 mL, Rfl: 0      Physical Exam:  BP (!) 178/104 (BP Location: Left arm, Patient Position: Sitting, Cuff Size: Large)   Pulse 66   Temp 97.6 °F (36.4 °C) (Temporal)   Resp 21   Ht 5' 10\" (1.778 m)   Wt 100 kg (221 lb)   SpO2 98%   BMI 31.71 kg/m²     Physical Exam  Constitutional:       General: He is not in acute distress.     Appearance: He is obese. He is not ill-appearing.   HENT:      Head: Atraumatic.      Nose: No congestion.      Mouth/Throat:      Mouth: Mucous membranes are moist.   Eyes:      Extraocular Movements: Extraocular movements intact.      Conjunctiva/sclera: Conjunctivae normal.      Pupils: Pupils are equal, round, and reactive to light.   Cardiovascular:      Rate and Rhythm: Normal rate and regular rhythm.      Pulses: Normal pulses.      Heart sounds: Normal heart sounds. No murmur heard.     No friction rub. No gallop.   Pulmonary:      Effort: Pulmonary effort is normal. No respiratory distress.      Breath sounds: Normal breath sounds. No wheezing or rales.   Chest:      Chest wall: No tenderness.   Abdominal:      General: Bowel sounds are normal. There is no distension.      Palpations: Abdomen is soft. There is no mass.      Tenderness: There is no abdominal tenderness. There is no right CVA tenderness, left CVA tenderness or guarding.   Musculoskeletal:         General: No swelling or tenderness. Normal range of motion.      Cervical back: No tenderness.      Right lower leg: No edema.      Left lower leg: No edema.   Skin:     General: Skin is warm.      Capillary Refill: Capillary refill takes less than 2 seconds.      Findings: No lesion or rash.   Neurological:      General: No focal " deficit present.      Mental Status: He is oriented to person, place, and time.   Psychiatric:      Comments: Denies SI/HI             Labs:  Lab Results   Component Value Date    WBC 13.49 (H) 01/12/2024    HGB 16.5 01/12/2024    HCT 51.1 (H) 01/12/2024    MCV 83 01/12/2024     01/12/2024     Lab Results   Component Value Date    K 4.4 01/12/2024     01/12/2024    CO2 28 01/12/2024    BUN 15 01/12/2024    CREATININE 0.80 01/12/2024    GLUF 125 (H) 01/12/2024    CALCIUM 10.0 01/12/2024    AST 11 (L) 01/12/2024    ALT 19 01/12/2024    ALKPHOS 73 01/12/2024    EGFR 97 01/12/2024

## 2024-01-23 ENCOUNTER — OFFICE VISIT (OUTPATIENT)
Dept: OBGYN CLINIC | Facility: MEDICAL CENTER | Age: 59
End: 2024-01-23
Payer: COMMERCIAL

## 2024-01-23 VITALS
WEIGHT: 221 LBS | HEART RATE: 84 BPM | HEIGHT: 70 IN | DIASTOLIC BLOOD PRESSURE: 88 MMHG | BODY MASS INDEX: 31.64 KG/M2 | SYSTOLIC BLOOD PRESSURE: 135 MMHG

## 2024-01-23 DIAGNOSIS — F17.200 SMOKER: ICD-10-CM

## 2024-01-23 DIAGNOSIS — M25.552 PAIN IN LEFT HIP: ICD-10-CM

## 2024-01-23 DIAGNOSIS — M16.12 PRIMARY OSTEOARTHRITIS OF ONE HIP, LEFT: Primary | ICD-10-CM

## 2024-01-23 PROBLEM — Z76.89 ENCOUNTER FOR SUPPORT AND COORDINATION OF TRANSITION OF CARE: Status: ACTIVE | Noted: 2024-01-23

## 2024-01-23 PROBLEM — I63.9 CEREBROVASCULAR ACCIDENT (CVA) (HCC): Status: ACTIVE | Noted: 2024-01-23

## 2024-01-23 PROCEDURE — 99244 OFF/OP CNSLTJ NEW/EST MOD 40: CPT | Performed by: ORTHOPAEDIC SURGERY

## 2024-01-23 RX ORDER — PRASUGREL 5 MG/1
5 TABLET, FILM COATED ORAL DAILY
COMMUNITY
Start: 2024-01-22 | End: 2025-01-21

## 2024-01-23 RX ORDER — ASPIRIN 81 MG/1
81 TABLET ORAL DAILY
COMMUNITY
Start: 2024-01-22 | End: 2025-01-21

## 2024-01-23 NOTE — ASSESSMENT & PLAN NOTE
Counseled on lifestyle changes, healthy diet, exercise as tolerated  Counseled on medication compliance  Encouraged tobacco cessation  Stressed the need to maintain blood pressure at goal  Follow-up with neurology as scheduled  Follow-up patient with cardiology as scheduled

## 2024-01-23 NOTE — LETTER
January 23, 2024     Rebecca Fay Kab-Perlman, MD  [address]  [address]  [facility] [address]    Patient: Neptali Elias   YOB: 1965   Date of Visit: 1/23/2024       Dear Dr. Kab-Perlman:    Thank you for referring Neptali Elias to me for evaluation. Below are my notes for this consultation.    If you have questions, please do not hesitate to call me. I look forward to following your patient along with you.         Sincerely,        Rosalia Higuera, DO        CC: MD Rosalia Serrano, DO  1/23/2024  4:10 PM  Sign when Signing Visit   Assessment/Plan    1. Primary osteoarthritis of one hip, left    2. Pain in left hip    3. Smoker      Orders Placed This Encounter   Procedures   • FL spine and pain procedure   • Ambulatory referral to Physical Therapy   • Ambulatory Referral to Smoking Cessation Program     Patient is experiencing severe left hip osteoarthritis, recent stroke, smoker  Conservative and nonconservative treatment options were discussed with patient at today's visit.  I discussed with patient with his recent stroke that he suffered on 1/13/2024 he should continue to follow-up with cardiology, neurology, internal medicine to address these issues.  I discussed with patient that he must continue to work on smoking cessation if he would like to consider left ADDISON in the future.  Patient understands this.  He understands he will have to come off blood thinners for surgery  Referral provided to smoking cessation program.  Can also d/w PCP  Will go for L hip IA CSI after 3/12.  He understands he has to wait 3 months between injection and surgery.  PT  Can take Tylenol 1,000mg by mouth every 8 hours as needed for pain.  Do not exceed 3,000mg per day.    Recommended to avoid NSAIDs   OTC Topical pain medication prn pain      Return if symptoms worsen or fail to improve.    I answered all of the patient's questions during the visit and provided education of the  patient's condition during the visit.  The patient verbalized understanding of the information given and agrees with the plan.  This note was dictated using Purple software.  It may contain errors including improperly dictated words.  Please contact physician directly for any questions.    History of Present Illness  Chief complaint:   Chief Complaint   Patient presents with   • Left Hip - Pain       HPI: Neptali Elias is a 58 y.o. male that c/o left hip pain.    Length of time hip pain has been present: 5 years  Any falls or trauma associated with onset of pain: no  Location of pain: lateral and posterior aspect   Does the pain radiate?: radiates to lateral aspect of the knee  Intermittent or constant: intermittent   Description of pain: sharp  Aggravating factors: standing, walking, putting on socks  Instability?: yes   Pain medication that has been tried: tylenol provided relief   Topical mediation that has been tried: yes provided relief   Has heat/ice been tried: yes provided relief   Can NSAIDs be taken?  If not why?: yes   Has PT or home exercises been tried?: no  Have steroid injections been tried?  Left hip IA with Dr. Harrison 12/12/23 only 1 month of relief.   Any history of surgery on that hip?:  no      History of MRSA: no  History of blood clots: yes, Saturday had a stroke   Family history of blood clots: Father had stroke 20 years ago  History of Hepatitis C:no  History of HIV: no  Are you a smoker:yes  Are you diabetic:no  Do you see a cardiologist: yes will start since recent stroke  Have you seen a dentist in the past year: no has appointment next month  Do you have a leg length discrepancy:  yes feels like left is shorter     Had stroke on Saturday seeing a cardiologist and neurology.     ROS:    See HPI for musculoskeletal review.   All other systems reviewed are negative     Historical Information  Past Medical History:   Diagnosis Date   • Colon polyp    • GERD (gastroesophageal  reflux disease)    • Hyperlipidemia    • Hypertension    • Sleep apnea      Past Surgical History:   Procedure Laterality Date   • ACHILLES TENDON REPAIR Right     1983 in [place]   • BACK SURGERY  2009    herniated L5-S1 disc repair   • COLONOSCOPY       Social History  Social History     Substance and Sexual Activity   Alcohol Use Not Currently    Comment: used to drink 2-3x/week 1 bottle of wine     Social History     Substance and Sexual Activity   Drug Use Yes   • Types: Marijuana    Comment: THC 3 times a week     Social History     Tobacco Use   Smoking Status Former   • Current packs/day: 0.00   • Types: Cigars, Cigarettes   • Quit date: 7/28/2020   • Years since quitting: 3.4   Smokeless Tobacco Never     Family History:   Family History   Problem Relation Age of Onset   • Hypertension Mother    • Hyperlipidemia Mother    • Prostate cancer Father    • Hyperlipidemia Father    • Stroke Father    • Diabetes type II Brother    • Hypertension Brother    • Prostate cancer Brother        Current Outpatient Medications on File Prior to Visit   Medication Sig Dispense Refill   • amLODIPine (NORVASC) 10 mg tablet Take 1 tablet (10 mg total) by mouth daily 90 tablet 1   • atorvastatin (LIPITOR) 40 mg tablet Take 1 tablet (40 mg total) by mouth daily 90 tablet 1   • b complex vitamins capsule Take 1 capsule by mouth daily     • Blood Pressure KIT Use 2 (two) times a day 1 kit 0   • esomeprazole (NexIUM) 20 mg capsule Take 1 capsule (20 mg total) by mouth daily in the early morning 30 capsule 1   • fluticasone (FLONASE) 50 mcg/act nasal spray 1 spray into each nostril daily 16 g 0   • hydrochlorothiazide (HYDRODIURIL) 25 mg tablet Take 1 tablet (25 mg total) by mouth daily 30 tablet 1   • losartan (COZAAR) 25 mg tablet Take 1 tablet (25 mg total) by mouth daily for 180 doses 90 tablet 1   • losartan (Cozaar) 50 mg tablet Take 1 tablet (50 mg total) by mouth daily 30 tablet 5   • nicotine polacrilex (NICORETTE) 4 mg gum  "Chew 1 each (4 mg total) as needed for smoking cessation 100 each 0   • Semaglutide-Weight Management (WEGOVY) 0.5 MG/0.5ML Inject 0.5 mL (0.5 mg total) under the skin once a week 2.5 mL 0     No current facility-administered medications on file prior to visit.     No Known Allergies    Objective  Vitals: Blood pressure 135/88, pulse 84, height 5' 10\" (1.778 m), weight 100 kg (221 lb).,Body mass index is 31.71 kg/m².    PE:  AAOx 3  WDWN  Hearing intact, no drainage from eyes  Regular rate  no audible wheezing  no abdominal distension  LE compartments soft, skin intact    left hip:   No dislocation/deformity  pos. [facility]  ROM: 95 flexion 40 ER 5 IR pain in all directions  pos. Florian Test  pos. Impingement test  No TTP over greater trochanter  Abduction: 5/5  Neg. Xena's test  mild TTP over SIJ    leftLE:    Sensation grossly intact   Palpable 2+ pulse  AT/GS intact    Back:    No TTP over lumbar spinous processes, paraspinal musculature  SLR: Neg.     Imaging Studies: I have personally reviewed pertinent films in PACS  lefthip: Severe osteoarthritis of left hip, complete loss of joint space, bone on bone, osteophytes      Scribe Attestation      I,:  Navin Perea am acting as a scribe while in the presence of the attending physician.:       I,:  Rosalia Higuera DO personally performed the services described in this documentation    as scribed in my presence.:                "

## 2024-01-23 NOTE — ASSESSMENT & PLAN NOTE
/104, elevated above goal less than 130/80.  Patient stable and asymptomatic in office today.    Counseled patient on stress reduction, weight loss, healthy diet low sodium, and exercise as tolerated  Stressed the need to maintain blood pressure at goal  Encourage home BP log daily, present on next visit  Discussed augmenting antihypertensive therapy, will add hydrochlorothiazide 25 mg p.o. daily  Continue on losartan 50 mg p.o. daily and amlodipine 10 mg p.o. daily  Follow-up with PCP in 3 weeks to assess change

## 2024-01-23 NOTE — ASSESSMENT & PLAN NOTE
Reports at baseline.  Does not feel depressed but overtaking and worrying since his recent stroke.  Follow-up up with behavioral health therapist, and follow-up with PCP

## 2024-01-23 NOTE — PROGRESS NOTES
Assessment/Plan     1. Primary osteoarthritis of one hip, left    2. Pain in left hip    3. Smoker      Orders Placed This Encounter   Procedures    FL spine and pain procedure    Ambulatory referral to Physical Therapy    Ambulatory Referral to Smoking Cessation Program     Patient is experiencing severe left hip osteoarthritis, recent stroke, smoker  Conservative and nonconservative treatment options were discussed with patient at today's visit.  I discussed with patient with his recent stroke that he suffered on 1/13/2024 he should continue to follow-up with cardiology, neurology, internal medicine to address these issues.  I discussed with patient that he must continue to work on smoking cessation if he would like to consider left ADDISON in the future.  Patient understands this.  He understands he will have to come off blood thinners for surgery  Referral provided to smoking cessation program.  Can also d/w PCP  Will go for L hip IA CSI after 3/12.  He understands he has to wait 3 months between injection and surgery.  PT  Can take Tylenol 1,000mg by mouth every 8 hours as needed for pain.  Do not exceed 3,000mg per day.    Recommended to avoid NSAIDs   OTC Topical pain medication prn pain      Return if symptoms worsen or fail to improve.    I answered all of the patient's questions during the visit and provided education of the patient's condition during the visit.  The patient verbalized understanding of the information given and agrees with the plan.  This note was dictated using [employer] software.  It may contain errors including improperly dictated words.  Please contact physician directly for any questions.    History of Present Illness   Chief complaint:   Chief Complaint   Patient presents with    Left Hip - Pain       HPI: Neptali Elias is a 58 y.o. male that c/o left hip pain.    Length of time hip pain has been present: 5 years  Any falls or trauma associated with onset of pain: no  Location of  pain: lateral and posterior aspect   Does the pain radiate?: radiates to lateral aspect of the knee  Intermittent or constant: intermittent   Description of pain: sharp  Aggravating factors: standing, walking, putting on socks  Instability?: yes   Pain medication that has been tried: tylenol provided relief   Topical mediation that has been tried: yes provided relief   Has heat/ice been tried: yes provided relief   Can NSAIDs be taken?  If not why?: yes   Has PT or home exercises been tried?: no  Have steroid injections been tried?  Left hip IA with Dr. Harrison 12/12/23 only 1 month of relief.   Any history of surgery on that hip?:  no      History of MRSA: no  History of blood clots: yes, Saturday had a stroke   Family history of blood clots: Father had stroke 20 years ago  History of Hepatitis C:no  History of HIV: no  Are you a smoker:yes  Are you diabetic:no  Do you see a cardiologist: yes will start since recent stroke  Have you seen a dentist in the past year: no has appointment next month  Do you have a leg length discrepancy:  yes feels like left is shorter     Had stroke on Saturday seeing a cardiologist and neurology.     ROS:    See HPI for musculoskeletal review.   All other systems reviewed are negative     Historical Information   Past Medical History:   Diagnosis Date    Colon polyp     GERD (gastroesophageal reflux disease)     Hyperlipidemia     Hypertension     Sleep apnea      Past Surgical History:   Procedure Laterality Date    ACHILLES TENDON REPAIR Right     1983 in [place]    BACK SURGERY  2009    herniated L5-S1 disc repair    COLONOSCOPY       Social History   Social History     Substance and Sexual Activity   Alcohol Use Not Currently    Comment: used to drink 2-3x/week 1 bottle of wine     Social History     Substance and Sexual Activity   Drug Use Yes    Types: Marijuana    Comment: THC 3 times a week     Social History     Tobacco Use   Smoking Status Former    Current packs/day: 0.00  "   Types: Cigars, Cigarettes    Quit date: 7/28/2020    Years since quitting: 3.4   Smokeless Tobacco Never     Family History:   Family History   Problem Relation Age of Onset    Hypertension Mother     Hyperlipidemia Mother     Prostate cancer Father     Hyperlipidemia Father     Stroke Father     Diabetes type II Brother     Hypertension Brother     Prostate cancer Brother        Current Outpatient Medications on File Prior to Visit   Medication Sig Dispense Refill    amLODIPine (NORVASC) 10 mg tablet Take 1 tablet (10 mg total) by mouth daily 90 tablet 1    atorvastatin (LIPITOR) 40 mg tablet Take 1 tablet (40 mg total) by mouth daily 90 tablet 1    b complex vitamins capsule Take 1 capsule by mouth daily      Blood Pressure KIT Use 2 (two) times a day 1 kit 0    esomeprazole (NexIUM) 20 mg capsule Take 1 capsule (20 mg total) by mouth daily in the early morning 30 capsule 1    fluticasone (FLONASE) 50 mcg/act nasal spray 1 spray into each nostril daily 16 g 0    hydrochlorothiazide (HYDRODIURIL) 25 mg tablet Take 1 tablet (25 mg total) by mouth daily 30 tablet 1    losartan (COZAAR) 25 mg tablet Take 1 tablet (25 mg total) by mouth daily for 180 doses 90 tablet 1    losartan (Cozaar) 50 mg tablet Take 1 tablet (50 mg total) by mouth daily 30 tablet 5    nicotine polacrilex (NICORETTE) 4 mg gum Chew 1 each (4 mg total) as needed for smoking cessation 100 each 0    Semaglutide-Weight Management (WEGOVY) 0.5 MG/0.5ML Inject 0.5 mL (0.5 mg total) under the skin once a week 2.5 mL 0     No current facility-administered medications on file prior to visit.     No Known Allergies    Objective   Vitals: Blood pressure 135/88, pulse 84, height 5' 10\" (1.778 m), weight 100 kg (221 lb).,Body mass index is 31.71 kg/m².    PE:  AAOx 3  WDWN  Hearing intact, no drainage from eyes  Regular rate  no audible wheezing  no abdominal distension  LE compartments soft, skin intact    left hip:   No dislocation/deformity  pos. [facility]  ROM: 95 flexion 40 ER 5 IR pain in all directions  pos. Florian Test  pos. Impingement test  No TTP over greater trochanter  Abduction: 5/5  Neg. Xena's test  mild TTP over SIJ    leftLE:    Sensation grossly intact   Palpable 2+ pulse  AT/GS intact    Back:    No TTP over lumbar spinous processes, paraspinal musculature  SLR: Neg.     Imaging Studies: I have personally reviewed pertinent films in PACS  lefthip: Severe osteoarthritis of left hip, complete loss of joint space, bone on bone, osteophytes      Scribe Attestation      I,:  Navin Perea am acting as a scribe while in the presence of the attending physician.:       I,:  Rosalia Higuera, DO personally performed the services described in this documentation    as scribed in my presence.:

## 2024-01-23 NOTE — ASSESSMENT & PLAN NOTE
Recent hospitalization for CVA stroke 1/13/2024.  Status post thrombectomy and stent placement.  Currently on dual antiplatelet therapy.  Doing well overall.  States his cardiology office called him this morning to discontinue Brilinta and start prasugrel 5 mg p.o. daily.     Plan  Continue dual antiplatelet therapy with aspirin 81 mg p.o. daily and prasugrel 5 mg p.o. daily  Continue high intensity statin Lipitor 40 mg p.o. daily  Counseled on blood pressure control improved diet, and medication compliance.  Encourage tobacco cessation, NRT order  Follow-up with neurology as scheduled  Follow-up patient with cardiology as scheduled  Follow-up with weight management, recently started on Wegovy 0.5 mg subQ weekly

## 2024-01-24 ENCOUNTER — PATIENT OUTREACH (OUTPATIENT)
Dept: OTHER | Facility: CLINIC | Age: 59
End: 2024-01-24

## 2024-02-08 ENCOUNTER — TELEPHONE (OUTPATIENT)
Dept: FAMILY MEDICINE CLINIC | Facility: CLINIC | Age: 59
End: 2024-02-08

## 2024-02-08 NOTE — TELEPHONE ENCOUNTER
Patient came into office and is requesting the following script be sent to the [facility] pharmacy on file due to his pharmacy not having the medication.     Semaglutide-Weight Management (WEGOVY) 0.5 MG/0.5ML       Any questions please contact patient, Thank you !

## 2024-02-13 ENCOUNTER — TELEPHONE (OUTPATIENT)
Dept: FAMILY MEDICINE CLINIC | Facility: CLINIC | Age: 59
End: 2024-02-13

## 2024-02-13 NOTE — TELEPHONE ENCOUNTER
Pt call office asking for you to change wegovy to ozempic which is the one you guys discuss. Please sent new Rx to pharmacy on chart. Thank you

## 2024-02-14 ENCOUNTER — TELEPHONE (OUTPATIENT)
Dept: FAMILY MEDICINE CLINIC | Facility: CLINIC | Age: 59
End: 2024-02-14

## 2024-02-14 NOTE — TELEPHONE ENCOUNTER
Patient call and ask to change Semaglutide-Weight Management (WEGOVY) 0.5 MG     For ozempic .5 because the pharmacy is not giving him the prescription     He want this prescription to be sent to [facility] in [place]     [address], [place] [address]

## 2024-02-15 NOTE — TELEPHONE ENCOUNTER
Patient came by asking about the medication below. He would like to know when will it be done. Patient stated he needs the medication. Please advise.

## 2024-02-19 ENCOUNTER — TELEPHONE (OUTPATIENT)
Dept: OBGYN CLINIC | Facility: CLINIC | Age: 59
End: 2024-02-19

## 2024-02-19 DIAGNOSIS — E66.9 CLASS 2 OBESITY WITH BODY MASS INDEX (BMI) OF 35.0 TO 35.9 IN ADULT, UNSPECIFIED OBESITY TYPE, UNSPECIFIED WHETHER SERIOUS COMORBIDITY PRESENT: Primary | ICD-10-CM

## 2024-02-19 NOTE — TELEPHONE ENCOUNTER
Received [employer] message that patient called requesting ozempic. Called patient to clarify and confirm pharmacy. Sent ozempic to pharmacy requested. Patient stated it's hard to get a hold of the medication as pharmacies will have the medication then quickly run out.I reiterated that the demand exceeds the supply currently. Patient expressed understanding.

## 2024-02-20 ENCOUNTER — TELEPHONE (OUTPATIENT)
Dept: FAMILY MEDICINE CLINIC | Facility: CLINIC | Age: 59
End: 2024-02-20

## 2024-02-21 PROBLEM — Z00.00 HEALTHCARE MAINTENANCE: Status: RESOLVED | Noted: 2024-01-08 | Resolved: 2024-02-21

## 2024-03-04 ENCOUNTER — OFFICE VISIT (OUTPATIENT)
Dept: GASTROENTEROLOGY | Facility: CLINIC | Age: 59
End: 2024-03-04
Payer: COMMERCIAL

## 2024-03-04 VITALS
OXYGEN SATURATION: 99 % | HEART RATE: 83 BPM | HEIGHT: 70 IN | TEMPERATURE: 97.7 F | SYSTOLIC BLOOD PRESSURE: 152 MMHG | BODY MASS INDEX: 32.5 KG/M2 | DIASTOLIC BLOOD PRESSURE: 84 MMHG | WEIGHT: 227 LBS

## 2024-03-04 DIAGNOSIS — Z00.00 ANNUAL PHYSICAL EXAM: ICD-10-CM

## 2024-03-04 DIAGNOSIS — I63.9 CEREBROVASCULAR ACCIDENT (CVA), UNSPECIFIED MECHANISM (HCC): ICD-10-CM

## 2024-03-04 DIAGNOSIS — K59.09 OTHER CONSTIPATION: Primary | ICD-10-CM

## 2024-03-04 DIAGNOSIS — Z86.010 PERSONAL HISTORY OF COLONIC POLYPS: ICD-10-CM

## 2024-03-04 PROCEDURE — 99244 OFF/OP CNSLTJ NEW/EST MOD 40: CPT | Performed by: INTERNAL MEDICINE

## 2024-03-04 RX ORDER — TAMSULOSIN HYDROCHLORIDE 0.4 MG/1
0.4 CAPSULE ORAL
COMMUNITY
Start: 2024-02-20 | End: 2025-02-19

## 2024-03-04 RX ORDER — OMEPRAZOLE 20 MG/1
20 CAPSULE, DELAYED RELEASE ORAL DAILY
COMMUNITY
Start: 2024-02-14

## 2024-03-04 NOTE — PROGRESS NOTES
[facility] - Outpatient Consultation  Neptali Elias 58 y.o. male MRN: [id number]  Encounter: [id number]          ASSESSMENT AND PLAN:    Neptali Elias is a 58 y.o. male with recent stroke status post tPA with improvement, LOUIE, diverticulosis, hemorrhoids, GERD, prior colon polyps who presents to establish care for colon polyps and constipation. Endoscopy and colonoscopy performed January 2021 notable for salmon-colored mucosa in the esophagus, small sliding hiatal hernia, erythema in the stomach and duodenum, 9 colonic polyps removed, diverticula in the sigmoid with erythema, external hemorrhoids.  Biopsies with benign duodenum, chronic inactive gastritis, mild chronic inflammation of the esophagus negative for Clements's, several tubular adenomas.    Echocardiogram from January 2024 with normal systolic function with EF of 60 to 65% and normal diastolic function.    MRI brain January 2024 with several small scattered acute cortical infarcts involving the right frontal, parietal, occipital lobes with additional acute infarcts involving the right caudate head and right centrum semiovale with overall findings consistent with known right ICA/MCA occlusion on likely representing combination of watershed and embolic infarcts.  No hemorrhagic conversion.    Most recent BMP normal.  Most recent CMP with elevated glucose at 123, calcium low at 8.4, otherwise normal electrolytes.  Most recent CBC with elevated white blood cell count 13.49, normal hemoglobin, MCV, platelets.  TSH normal.  Prior CRP normal.    1. Other constipation    2. Annual physical exam    3. Personal history of colonic polyps    4. Cerebrovascular accident (CVA), unspecified mechanism (HCC)        Orders Placed This Encounter   Procedures    Colonoscopy     Repeat colonoscopy as the patient is due, but will defer for in 6 months given recent stroke (or when the patient can safely discontinue his  prasugrel).  Recommend neurology clearance for colonoscopy once off effient  Continue omeprazole  Patient currently on semaglutide.  Of note this may be associated with significant symptoms of nausea, vomiting, early satiety, change in bowel movements with diarrhea and/or constipation.  Smoking cessation counseling.  Benefiber daily  Drink at least 8 cups of water per day    ______________________________________________________________________    Referred by Dr. Perlman for polyps    HPI:    Neptali Elias is a 58 y.o. male who presents with complaint of polyps.    He had a recent stroke. He went to the hospital and he was identified very quickly. He only has some left hand sensitivity.   Sometimes if he eats too much starch he gets constipated. He eats a lot of veggies. Sometimes he strains. Variable consistency. No blood or black stools. No abdominal pain.  + heartburn and he takes omeprazole and it helps a lot.   No dysphagia, odynophagia, nausea, vomiting, weight loss.     REVIEW OF SYSTEMS:  10 point ROS reviewed and negative, except as above        Historical Information   Past Medical History:   Diagnosis Date    Colon polyp     GERD (gastroesophageal reflux disease)     Hyperlipidemia     Hypertension     Sleep apnea      Past Surgical History:   Procedure Laterality Date    ACHILLES TENDON REPAIR Right     1983 in [place]    BACK SURGERY  2009    herniated L5-S1 disc repair    COLONOSCOPY       Social History   Social History     Substance and Sexual Activity   Alcohol Use Not Currently    Comment: used to drink 2-3x/week 1 bottle of wine     Social History     Substance and Sexual Activity   Drug Use Not Currently    Types: Marijuana    Comment: THC 3 times a week     Social History     Tobacco Use   Smoking Status Some Days    Types: Cigars, Cigarettes   Smokeless Tobacco Never   Tobacco Comments    1 cigar a day     Family History   Problem Relation Age of Onset    Hypertension Mother      "Hyperlipidemia Mother     Prostate cancer Father     Hyperlipidemia Father     Stroke Father     Diabetes type II Brother     Hypertension Brother     Prostate cancer Brother        Meds/Allergies       Current Outpatient Medications:     amLODIPine (NORVASC) 10 mg tablet    aspirin (ECOTRIN LOW STRENGTH) 81 mg EC tablet    atorvastatin (LIPITOR) 40 mg tablet    Blood Pressure KIT    hydrochlorothiazide (HYDRODIURIL) 25 mg tablet    losartan (COZAAR) 25 mg tablet    metoprolol tartrate (LOPRESSOR) 25 mg tablet    nicotine polacrilex (NICORETTE) 4 mg gum    omeprazole (PriLOSEC) 20 mg delayed release capsule    prasugrel (EFFIENT) tablet    semaglutide, 0.25 or 0.5 mg/dose, (Ozempic, 0.25 or 0.5 MG/DOSE,) 2 mg/3 mL injection pen    tamsulosin (FLOMAX) 0.4 mg    b complex vitamins capsule    esomeprazole (NexIUM) 20 mg capsule    fluticasone (FLONASE) 50 mcg/act nasal spray    losartan (Cozaar) 50 mg tablet    No Known Allergies        Objective     Blood pressure 152/84, pulse 83, temperature 97.7 °F (36.5 °C), temperature source Tympanic, height 5' 10\" (1.778 m), weight 103 kg (227 lb), SpO2 99%. Body mass index is 32.57 kg/m².    PHYSICAL EXAMINATION:    General Appearance:   Alert, cooperative, no distress   HEENT:  Normocephalic, atraumatic, anicteric. Neck supple, symmetrical, trachea midline.   Lungs:   Equal chest rise and unlabored breathing, normal effort, no coughing.   Cardiovascular:   No visualized JVD.   Abdomen:   No abdominal distension.   Skin:   No jaundice, rashes, or lesions.    Musculoskeletal:   Normal range of motion visualized.   Psych:  Normal affect and normal insight.   Neuro:  Alert and appropriate.         Lab Results:   No visits with results within 1 Day(s) from this visit.   Latest known visit with results is:   Appointment on 01/12/2024   Component Date Value    WBC 01/12/2024 13.49 (H)     RBC 01/12/2024 6.16 (H)     Hemoglobin 01/12/2024 16.5     Hematocrit 01/12/2024 51.1 (H)     " "MCV 01/12/2024 83     MCH 01/12/2024 26.8     MCHC 01/12/2024 32.3     RDW 01/12/2024 14.6     MPV 01/12/2024 9.9     Platelets 01/12/2024 356     nRBC 01/12/2024 0     Neutrophils Relative 01/12/2024 89 (H)     Immat GRANS % 01/12/2024 0     Lymphocytes Relative 01/12/2024 6 (L)     Monocytes Relative 01/12/2024 5     Eosinophils Relative 01/12/2024 0     Basophils Relative 01/12/2024 0     Neutrophils Absolute 01/12/2024 11.87 (H)     Immature Grans Absolute 01/12/2024 0.06     Lymphocytes Absolute 01/12/2024 0.85     Monocytes Absolute 01/12/2024 0.69     Eosinophils Absolute 01/12/2024 0.01     Basophils Absolute 01/12/2024 0.01     Sodium 01/12/2024 138     Potassium 01/12/2024 4.4     Chloride 01/12/2024 103     CO2 01/12/2024 28     ANION GAP 01/12/2024 7     BUN 01/12/2024 15     Creatinine 01/12/2024 0.80     Glucose, Fasting 01/12/2024 125 (H)     Calcium 01/12/2024 10.0     AST 01/12/2024 11 (L)     ALT 01/12/2024 19     Alkaline Phosphatase 01/12/2024 73     Total Protein 01/12/2024 8.1     Albumin 01/12/2024 4.9     Total Bilirubin 01/12/2024 0.64     eGFR 01/12/2024 97        Lab Results   Component Value Date    WBC 13.49 (H) 01/12/2024    HGB 16.5 01/12/2024    HCT 51.1 (H) 01/12/2024    MCV 83 01/12/2024     01/12/2024       Lab Results   Component Value Date    SODIUM 139 01/17/2024    K 3.9 01/17/2024     01/17/2024    CO2 28 01/17/2024    AGAP 5 01/17/2024    BUN 14 01/17/2024    CREATININE 0.71 01/17/2024    GLUC 99 01/17/2024    GLUF 125 (H) 01/12/2024    CALCIUM 8.9 01/17/2024    AST 13 01/13/2024    ALT 19 01/13/2024    ALKPHOS 57 01/13/2024    TP 6.9 01/13/2024    TBILI 0.4 01/13/2024    EGFR 105 01/17/2024       Lab Results   Component Value Date    CRP 2.6 11/14/2023       Lab Results   Component Value Date    HEW3EVSKLXVZ 1.760 09/09/2022    TSH 3.51 01/14/2024       No results found for: \"IRON\", \"TIBC\", \"FERRITIN\"    Radiology Results:   No results found.  "

## 2024-03-07 ENCOUNTER — TELEPHONE (OUTPATIENT)
Age: 59
End: 2024-03-07

## 2024-03-07 NOTE — TELEPHONE ENCOUNTER
Caller: Patient     Doctor: Ankit     Reason for call: Patient would like to have another FL injection, left hip. Please place order and coordinate with patient to schedule.    Call back#: [phone]

## 2024-03-08 DIAGNOSIS — T78.40XA ALLERGY, INITIAL ENCOUNTER: ICD-10-CM

## 2024-03-08 RX ORDER — FLUTICASONE PROPIONATE 50 MCG
SPRAY, SUSPENSION (ML) NASAL
Qty: 16 ML | Refills: 0 | Status: SHIPPED | OUTPATIENT
Start: 2024-03-08

## 2024-03-08 NOTE — TELEPHONE ENCOUNTER
Procedure scheduled 3/20/24.    Reviewed instructions: , NPO 1 hour prior, loose-fitting/comfortable clothes, if ill/fever/infx/abx to call and reschedule.   Patient stated verbal understanding.

## 2024-03-20 ENCOUNTER — HOSPITAL ENCOUNTER (OUTPATIENT)
Dept: RADIOLOGY | Facility: MEDICAL CENTER | Age: 59
Discharge: HOME | End: 2024-03-20
Admitting: PHYSICAL MEDICINE & REHABILITATION
Payer: COMMERCIAL

## 2024-03-20 VITALS
OXYGEN SATURATION: 99 % | TEMPERATURE: 97.4 F | DIASTOLIC BLOOD PRESSURE: 72 MMHG | SYSTOLIC BLOOD PRESSURE: 116 MMHG | RESPIRATION RATE: 20 BRPM | HEART RATE: 78 BPM

## 2024-03-20 DIAGNOSIS — M25.552 PAIN IN LEFT HIP: ICD-10-CM

## 2024-03-20 DIAGNOSIS — M16.12 PRIMARY OSTEOARTHRITIS OF ONE HIP, LEFT: ICD-10-CM

## 2024-03-20 PROCEDURE — 20610 DRAIN/INJ JOINT/BURSA W/O US: CPT | Performed by: PHYSICAL MEDICINE & REHABILITATION

## 2024-03-20 PROCEDURE — 77002 NEEDLE LOCALIZATION BY XRAY: CPT | Performed by: PHYSICAL MEDICINE & REHABILITATION

## 2024-03-20 PROCEDURE — 77002 NEEDLE LOCALIZATION BY XRAY: CPT

## 2024-03-20 RX ORDER — METHYLPREDNISOLONE ACETATE 40 MG/ML
40 INJECTION, SUSPENSION INTRA-ARTICULAR; INTRALESIONAL; INTRAMUSCULAR; PARENTERAL; SOFT TISSUE ONCE
Status: COMPLETED | OUTPATIENT
Start: 2024-03-20 | End: 2024-03-20

## 2024-03-20 RX ORDER — ROPIVACAINE HYDROCHLORIDE 2 MG/ML
3 INJECTION, SOLUTION EPIDURAL; INFILTRATION; PERINEURAL ONCE
Status: COMPLETED | OUTPATIENT
Start: 2024-03-20 | End: 2024-03-20

## 2024-03-20 RX ADMIN — IOHEXOL 2 ML: 300 INJECTION, SOLUTION INTRAVENOUS at 15:30

## 2024-03-20 RX ADMIN — METHYLPREDNISOLONE ACETATE 40 MG: 40 INJECTION, SUSPENSION INTRA-ARTICULAR; INTRALESIONAL; INTRAMUSCULAR; PARENTERAL; SOFT TISSUE at 15:30

## 2024-03-20 RX ADMIN — ROPIVACAINE HYDROCHLORIDE 3 ML: 2 INJECTION, SOLUTION EPIDURAL; INFILTRATION; PERINEURAL at 15:30

## 2024-03-20 NOTE — DISCHARGE INSTRUCTIONS

## 2024-03-20 NOTE — H&P
History of Present Illness: The patient is a 58 y.o. male who presents with complaints of left hip pain    Past Medical History:   Diagnosis Date    Colon polyp     GERD (gastroesophageal reflux disease)     Hyperlipidemia     Hypertension     Sleep apnea        Past Surgical History:   Procedure Laterality Date    ACHILLES TENDON REPAIR Right     1983 in [place]    BACK SURGERY  2009    herniated L5-S1 disc repair    COLONOSCOPY           Current Outpatient Medications:     amLODIPine (NORVASC) 10 mg tablet, Take 1 tablet (10 mg total) by mouth daily, Disp: 90 tablet, Rfl: 1    aspirin (ECOTRIN LOW STRENGTH) 81 mg EC tablet, Take 81 mg by mouth daily, Disp: , Rfl:     atorvastatin (LIPITOR) 40 mg tablet, Take 1 tablet (40 mg total) by mouth daily, Disp: 90 tablet, Rfl: 1    b complex vitamins capsule, Take 1 capsule by mouth daily, Disp: , Rfl:     Blood Pressure KIT, Use 2 (two) times a day, Disp: 1 kit, Rfl: 0    esomeprazole (NexIUM) 20 mg capsule, Take 1 capsule (20 mg total) by mouth daily in the early morning, Disp: 30 capsule, Rfl: 1    fluticasone (FLONASE) 50 mcg/act nasal spray, SPRAY 1 SPRAY INTO EACH NOSTRIL EVERY DAY, Disp: 16 mL, Rfl: 0    hydrochlorothiazide (HYDRODIURIL) 25 mg tablet, Take 1 tablet (25 mg total) by mouth daily, Disp: 30 tablet, Rfl: 1    losartan (COZAAR) 25 mg tablet, Take 1 tablet (25 mg total) by mouth daily for 180 doses, Disp: 90 tablet, Rfl: 1    losartan (Cozaar) 50 mg tablet, Take 1 tablet (50 mg total) by mouth daily (Patient not taking: Reported on 3/4/2024), Disp: 30 tablet, Rfl: 5    metoprolol tartrate (LOPRESSOR) 25 mg tablet, Take 12.5 mg by mouth 2 (two) times a day, Disp: , Rfl:     nicotine polacrilex (NICORETTE) 4 mg gum, Chew 1 each (4 mg total) as needed for smoking cessation, Disp: 100 each, Rfl: 0    omeprazole (PriLOSEC) 20 mg delayed release capsule, Take 20 mg by mouth daily, Disp: , Rfl:     prasugrel (EFFIENT) tablet, Take 5 mg by mouth daily, Disp: , Rfl:      semaglutide, 0.25 or 0.5 mg/dose, (Ozempic, 0.25 or 0.5 MG/DOSE,) 2 mg/3 mL injection pen, Inject 0.75 mL (0.5 mg total) under the skin every 7 days, Disp: 0.5 mL, Rfl: 0    tamsulosin (FLOMAX) 0.4 mg, Take 0.4 mg by mouth, Disp: , Rfl:     No Known Allergies    Physical Exam:   Vitals:    03/20/24 1521   BP: 107/67   Pulse: 83   Resp: 20   Temp: (!) 97.4 °F (36.3 °C)   SpO2: 99%     General: Awake, Alert, Oriented x 3, Mood and affect appropriate  Respiratory: Respirations even and unlabored  Cardiovascular: Peripheral pulses intact; no edema  Musculoskeletal Exam: left hip pain    ASA Score: 3    Patient/Chart Verification  Patient ID Verified: Verbal  ID Band Applied: No  Consents Confirmed: Procedural, To be obtained in the Pre-Procedure area  H&P( within 30 days) Verified: To be obtained in the Pre-Procedure area  Interval H&P(within 24 hr) Complete (required for Outpatients and Surgery Admit only): To be obtained in the Pre-Procedure area  Allergies Reviewed: Yes  Anticoag/NSAID held?: NA (Effient, no need to hold for this procedure)  Currently on antibiotics?: No    Assessment:   1. Pain in left hip    2. Primary osteoarthritis of one hip, left        Plan: left hip arthritis

## 2024-03-27 ENCOUNTER — TELEPHONE (OUTPATIENT)
Dept: PAIN MEDICINE | Facility: CLINIC | Age: 59
End: 2024-03-27

## 2024-04-12 ENCOUNTER — OFFICE VISIT (OUTPATIENT)
Dept: OBGYN CLINIC | Facility: MEDICAL CENTER | Age: 59
End: 2024-04-12

## 2024-04-12 VITALS
WEIGHT: 214.2 LBS | HEART RATE: 64 BPM | HEIGHT: 70 IN | SYSTOLIC BLOOD PRESSURE: 154 MMHG | DIASTOLIC BLOOD PRESSURE: 78 MMHG | BODY MASS INDEX: 30.67 KG/M2

## 2024-04-12 DIAGNOSIS — M25.562 CHRONIC PAIN OF LEFT KNEE: Primary | ICD-10-CM

## 2024-04-12 DIAGNOSIS — G89.29 CHRONIC PAIN OF LEFT KNEE: Primary | ICD-10-CM

## 2024-04-12 DIAGNOSIS — M17.12 PRIMARY OSTEOARTHRITIS OF LEFT KNEE: ICD-10-CM

## 2024-04-12 RX ORDER — TRIAMCINOLONE ACETONIDE 40 MG/ML
40 INJECTION, SUSPENSION INTRA-ARTICULAR; INTRAMUSCULAR
Status: COMPLETED | OUTPATIENT
Start: 2024-04-12 | End: 2024-04-12

## 2024-04-12 RX ORDER — LIDOCAINE HYDROCHLORIDE 10 MG/ML
4 INJECTION, SOLUTION INFILTRATION; PERINEURAL
Status: COMPLETED | OUTPATIENT
Start: 2024-04-12 | End: 2024-04-12

## 2024-04-12 RX ORDER — BLOOD PRESSURE TEST KIT
KIT MISCELLANEOUS
COMMUNITY
Start: 2023-12-20

## 2024-04-12 RX ADMIN — TRIAMCINOLONE ACETONIDE 40 MG: 40 INJECTION, SUSPENSION INTRA-ARTICULAR; INTRAMUSCULAR at 11:15

## 2024-04-12 RX ADMIN — LIDOCAINE HYDROCHLORIDE 4 ML: 10 INJECTION, SOLUTION INFILTRATION; PERINEURAL at 11:15

## 2024-04-12 NOTE — PROGRESS NOTES
Assessment/Plan:    Diagnoses and all orders for this visit:    Chronic pain of left knee  -     Large joint arthrocentesis: L knee    Primary osteoarthritis of left knee  -     Large joint arthrocentesis: L knee    Other orders  -     Blood Pressure Monitor KIT; Use    We provided a repeat CSI of the left knee which the patient requested today since his previous CSI did provide him significant relief up until the last 3 weeks.  Reviewed hospital admission note, reviewed orthopedic hip and knee specialist note    Return if symptoms worsen or fail to improve.      Subjective:   Patient ID: Neptali Elias is a 58 y.o. male.    Patient returns to the office for chronic left knee pain status post intra-articular CSI performed last visit January 2024.  However since the last evaluation he has been treated for a CVA.  He was also evaluated by orthopedic hip and knee specialist for osteoarthritis and pain decision was to hold off on hip replacement at this time      Review of Systems    The following portions of the patient's chart were reviewed and updated as appropriate:   Allergy:  No Known Allergies    Medications:    Current Outpatient Medications:     amLODIPine (NORVASC) 10 mg tablet, Take 1 tablet (10 mg total) by mouth daily, Disp: 90 tablet, Rfl: 1    aspirin (ECOTRIN LOW STRENGTH) 81 mg EC tablet, Take 81 mg by mouth daily, Disp: , Rfl:     atorvastatin (LIPITOR) 40 mg tablet, Take 1 tablet (40 mg total) by mouth daily, Disp: 90 tablet, Rfl: 1    Blood Pressure KIT, Use 2 (two) times a day, Disp: 1 kit, Rfl: 0    Blood Pressure Monitor KIT, Use, Disp: , Rfl:     fluticasone (FLONASE) 50 mcg/act nasal spray, SPRAY 1 SPRAY INTO EACH NOSTRIL EVERY DAY, Disp: 16 mL, Rfl: 0    hydrochlorothiazide (HYDRODIURIL) 25 mg tablet, Take 1 tablet (25 mg total) by mouth daily, Disp: 30 tablet, Rfl: 1    losartan (COZAAR) 25 mg tablet, Take 1 tablet (25 mg total) by mouth daily for 180 doses, Disp: 90 tablet, Rfl:  "1    metoprolol tartrate (LOPRESSOR) 25 mg tablet, Take 12.5 mg by mouth 2 (two) times a day, Disp: , Rfl:     nicotine polacrilex (NICORETTE) 4 mg gum, Chew 1 each (4 mg total) as needed for smoking cessation, Disp: 100 each, Rfl: 0    omeprazole (PriLOSEC) 20 mg delayed release capsule, Take 20 mg by mouth daily, Disp: , Rfl:     prasugrel (EFFIENT) tablet, Take 5 mg by mouth daily, Disp: , Rfl:     semaglutide, 0.25 or 0.5 mg/dose, (Ozempic, 0.25 or 0.5 MG/DOSE,) 2 mg/3 mL injection pen, Inject 0.75 mL (0.5 mg total) under the skin every 7 days, Disp: 0.5 mL, Rfl: 0    tamsulosin (FLOMAX) 0.4 mg, Take 0.4 mg by mouth, Disp: , Rfl:     b complex vitamins capsule, Take 1 capsule by mouth daily, Disp: , Rfl:     esomeprazole (NexIUM) 20 mg capsule, Take 1 capsule (20 mg total) by mouth daily in the early morning, Disp: 30 capsule, Rfl: 1    losartan (Cozaar) 50 mg tablet, Take 1 tablet (50 mg total) by mouth daily (Patient not taking: Reported on 3/4/2024), Disp: 30 tablet, Rfl: 5    Patient Active Problem List   Diagnosis    Essential hypertension    LOUIE (obstructive sleep apnea)    Chronic midline low back pain    Class 2 obesity in adult    Mixed hyperlipidemia    Gastroesophageal reflux disease    Difficulty using continuous positive airway pressure (CPAP) device    Primary osteoarthritis of one hip, left    Mood changes    Nocturia    Extremity numbness    Fatigue    Ulnar neuropathy of both upper extremities    Bilateral carpal tunnel syndrome    Swelling of right hand    Calcification of right carotid artery    Pain in left hip    Sliding hiatal hernia    External hemorrhoids    Diverticulosis    BMI 32.0-32.9,adult    Allergies    Encounter for support and coordination of transition of care    Cerebrovascular accident (CVA) (HCC)    Tobacco use disorder       Objective:  /78   Pulse 64   Ht 5' 10\" (1.778 m)   Wt 97.2 kg (214 lb 3.2 oz)   BMI 30.73 kg/m²     Left Knee Exam     Other   Erythema: " "absent            Physical Exam      Neurologic Exam    Large joint arthrocentesis: L knee  Universal Protocol:  Consent: Verbal consent obtained.  Risks and benefits: risks, benefits and alternatives were discussed  Consent given by: patient  Time out: Immediately prior to procedure a \"time out\" was called to verify the correct patient, procedure, equipment, support staff and site/side marked as required.  Timeout called at: 4/12/2024 12:28 PM.  Patient understanding: patient states understanding of the procedure being performed  Test results: test results available and properly labeled  Site marked: the operative site was marked  Patient identity confirmed: verbally with patient  Supporting Documentation  Indications: pain   Procedure Details  Location: knee - L knee  Preparation: Patient was prepped and draped in the usual sterile fashion  Needle size: 22 G  Ultrasound guidance: no  Approach: anterolateral  Medications administered: 4 mL lidocaine 1 %; 40 mg triamcinolone acetonide 40 mg/mL    Patient tolerance: patient tolerated the procedure well with no immediate complications  Dressing:  Sterile dressing applied    No erythema of knee(s)          I have personally reviewed the written report of the pertinent studies.             Past Medical History:   Diagnosis Date    Colon polyp     GERD (gastroesophageal reflux disease)     Hyperlipidemia     Hypertension     Sleep apnea        Past Surgical History:   Procedure Laterality Date    ACHILLES TENDON REPAIR Right     1983 in [place]    BACK SURGERY  2009    herniated L5-S1 disc repair    COLONOSCOPY         Social History     Socioeconomic History    Marital status: Single     Spouse name: Not on file    Number of children: Not on file    Years of education: Not on file    Highest education level: Not on file   Occupational History    Not on file   Tobacco Use    Smoking status: Some Days     Types: Cigars, Cigarettes    Smokeless tobacco: Never    Tobacco " comments:     1 cigar a day   Vaping Use    Vaping status: Never Used   Substance and Sexual Activity    Alcohol use: Not Currently     Comment: used to drink 2-3x/week 1 bottle of wine    Drug use: Not Currently     Types: Marijuana     Comment: THC 3 times a week    Sexual activity: Yes     Partners: Female     Comment: same for 25 years   Other Topics Concern    Not on file   Social History Narrative    2024    Lives in apartment    No pets     Lives with wife and father      Social Determinants of Health     Financial Resource Strain: Low Risk  (1/16/2024)    Received from [facility]    Overall Financial Resource Strain (CARDIA)     Difficulty of Paying Living Expenses: Not hard at all   Food Insecurity: No Food Insecurity (1/16/2024)    Received from [facility]    Hunger Vital Sign     Worried About Running Out of Food in the Last Year: Never true     Ran Out of Food in the Last Year: Never true   Transportation Needs: No Transportation Needs (1/16/2024)    Received from [facility]    PRAPARE - Transportation     Lack of Transportation (Medical): No     Lack of Transportation (Non-Medical): No   Physical Activity: Not on file   Stress: Not on file   Social Connections: Not on file   Intimate Partner Violence: Not At Risk (1/16/2024)    Received from [facility]    Humiliation, Afraid, Rape, and Kick questionnaire     Fear of Current or Ex-Partner: No     Emotionally Abused: No     Physically Abused: No     Sexually Abused: No   Housing Stability: Low Risk  (1/16/2024)    Received from [facility]    Housing Stability Vital Sign     Unable to Pay for Housing in the Last Year: No     Number of Places Lived in the Last Year: 1     Unstable Housing in the Last Year: No       Family History   Problem Relation Age of Onset    Hypertension Mother     Hyperlipidemia Mother     Prostate cancer Father     Hyperlipidemia Father      Stroke Father     Diabetes type II Brother     Hypertension Brother     Prostate cancer Brother

## 2024-04-20 DIAGNOSIS — I10 ESSENTIAL HYPERTENSION: ICD-10-CM

## 2024-04-22 RX ORDER — AMLODIPINE BESYLATE 10 MG/1
10 TABLET ORAL DAILY
Qty: 90 TABLET | Refills: 1 | Status: SHIPPED | OUTPATIENT
Start: 2024-04-22

## 2024-04-30 ENCOUNTER — PATIENT OUTREACH (OUTPATIENT)
Dept: FAMILY MEDICINE CLINIC | Facility: CLINIC | Age: 59
End: 2024-04-30

## 2024-04-30 NOTE — PROGRESS NOTES
Incoming Call  04/30/2024    OC received phone call from Hamzah Elias on this day.    Hamzah would like to continue Waiver Program application process for his dad due Hamzah recently had stroke and can not take care of his dad.    CMOC explained Hamzah that I will discuss this referral with ASHISH at the office and request new referral for assistance. Hamzah was very thankful.    Also CMOC explained hamzah that will take couple of days until I call him back to schedule Home Visit. Hamzah expressed understanding.    CMOC called Rafaelina Reyes,SW to explain Hamzah's request. ASHISH Hobson will place new referral.    [facility] will continue to follow up at soon new referral is placed by ASHISH Hobson.    No outreach was scheduled at this time.

## 2024-05-01 ENCOUNTER — PATIENT OUTREACH (OUTPATIENT)
Dept: FAMILY MEDICINE CLINIC | Facility: CLINIC | Age: 59
End: 2024-05-01

## 2024-05-01 DIAGNOSIS — Z75.4 INADEQUATE COMMUNITY RESOURCES: Primary | ICD-10-CM

## 2024-05-01 DIAGNOSIS — Z78.9 NEEDS ASSISTANCE WITH COMMUNITY RESOURCES: ICD-10-CM

## 2024-05-01 NOTE — PROGRESS NOTES
ASHISH SHARMA did receive a call from CMOC Benita Day. We both discussed about Pt's current state. CMOC stated that she received a phone call from Neptali Elias. Neptali would like to continue Waiver Program application process for his dad due Neptali recently had stroke and can not take care of his dad. CMOC inquired ASHISH SHARMA to please add a new referral for this Pt to assist his dad in Waiver Program process. ASHISH SHARMA explained CM that she will place the referral and the goal for this Pt.     After chart review ASHISH SHARMA did place CMOC referral.  ASHISH SHARMA is remain available for further assistance as needed.  ASHISH SHARMA will continue to follow-up.

## 2024-05-07 ENCOUNTER — PATIENT OUTREACH (OUTPATIENT)
Dept: FAMILY MEDICINE CLINIC | Facility: CLINIC | Age: 59
End: 2024-05-07

## 2024-05-07 NOTE — PROGRESS NOTES
Outgoing Call  05/07/2024    [facility] called Neptali on this day regarding Waiver Program application for his dad.    Neptali did not answer at this time. CMOC left voicemail to please call back.    OC will continue to follow up.    Next outreach was scheduled on 05/13/2024.

## 2024-05-10 ENCOUNTER — PATIENT OUTREACH (OUTPATIENT)
Dept: FAMILY MEDICINE CLINIC | Facility: CLINIC | Age: 59
End: 2024-05-10

## 2024-05-10 NOTE — PROGRESS NOTES
Incoming Text Message  05/10/2024    CMOC receied text message from Neptali on this day.    Neptali stated that he needs to travel out of country due family emergency.    [facility] suggested Neptali to call as soon he is back home; and ready to start working Waiver Program application process. Neptali will do.    This referral will be closed on my behalf today 05/10/2024.    No follow up was scheduled at this time.  
09-Aug-2019 10:18

## 2024-05-12 ENCOUNTER — APPOINTMENT (EMERGENCY)
Dept: CT IMAGING | Facility: HOSPITAL | Age: 59
End: 2024-05-12
Payer: COMMERCIAL

## 2024-05-12 ENCOUNTER — HOSPITAL ENCOUNTER (EMERGENCY)
Facility: HOSPITAL | Age: 59
Discharge: HOME | End: 2024-05-12
Attending: EMERGENCY MEDICINE
Payer: COMMERCIAL

## 2024-05-12 ENCOUNTER — APPOINTMENT (EMERGENCY)
Dept: RADIOLOGY | Facility: HOSPITAL | Age: 59
End: 2024-05-12
Payer: COMMERCIAL

## 2024-05-12 VITALS
OXYGEN SATURATION: 95 % | HEART RATE: 60 BPM | SYSTOLIC BLOOD PRESSURE: 137 MMHG | DIASTOLIC BLOOD PRESSURE: 74 MMHG | TEMPERATURE: 98.1 F | RESPIRATION RATE: 18 BRPM

## 2024-05-12 DIAGNOSIS — M19.90 ARTHRITIS: ICD-10-CM

## 2024-05-12 DIAGNOSIS — S73.102A HIP SPRAIN, LEFT, INITIAL ENCOUNTER: Primary | ICD-10-CM

## 2024-05-12 PROCEDURE — 99284 EMERGENCY DEPT VISIT MOD MDM: CPT

## 2024-05-12 PROCEDURE — 73700 CT LOWER EXTREMITY W/O DYE: CPT

## 2024-05-12 PROCEDURE — 99284 EMERGENCY DEPT VISIT MOD MDM: CPT | Performed by: EMERGENCY MEDICINE

## 2024-05-12 PROCEDURE — 73502 X-RAY EXAM HIP UNI 2-3 VIEWS: CPT

## 2024-05-12 PROCEDURE — 96372 THER/PROPH/DIAG INJ SC/IM: CPT

## 2024-05-12 RX ORDER — HYDROCODONE BITARTRATE AND ACETAMINOPHEN 5; 325 MG/1; MG/1
1 TABLET ORAL ONCE
Status: COMPLETED | OUTPATIENT
Start: 2024-05-12 | End: 2024-05-12

## 2024-05-12 RX ORDER — KETOROLAC TROMETHAMINE 30 MG/ML
15 INJECTION, SOLUTION INTRAMUSCULAR; INTRAVENOUS ONCE
Status: COMPLETED | OUTPATIENT
Start: 2024-05-12 | End: 2024-05-12

## 2024-05-12 RX ORDER — HALOPERIDOL 5 MG/ML
5 INJECTION INTRAMUSCULAR ONCE
Status: COMPLETED | OUTPATIENT
Start: 2024-05-12 | End: 2024-05-12

## 2024-05-12 RX ORDER — METHYLPREDNISOLONE 4 MG/1
TABLET ORAL
Qty: 21 TABLET | Refills: 0 | Status: SHIPPED | OUTPATIENT
Start: 2024-05-12

## 2024-05-12 RX ORDER — LIDOCAINE 50 MG/G
1 PATCH TOPICAL ONCE
Status: DISCONTINUED | OUTPATIENT
Start: 2024-05-12 | End: 2024-05-12 | Stop reason: HOSPADM

## 2024-05-12 RX ADMIN — HYDROCODONE BITARTRATE AND ACETAMINOPHEN 1 TABLET: 5; 325 TABLET ORAL at 17:43

## 2024-05-12 RX ADMIN — HALOPERIDOL LACTATE 5 MG: 5 INJECTION, SOLUTION INTRAMUSCULAR at 21:12

## 2024-05-12 RX ADMIN — LIDOCAINE 5% 1 PATCH: 700 PATCH TOPICAL at 17:43

## 2024-05-12 RX ADMIN — KETOROLAC TROMETHAMINE 15 MG: 30 INJECTION, SOLUTION INTRAMUSCULAR; INTRAVENOUS at 20:47

## 2024-05-12 NOTE — ED PROVIDER NOTES
History  Chief Complaint   Patient presents with    Hip Pain     Pt reports left hip pain since yesterday radiates from hip to knee, walks with a cane at home, reports he is unable walk at this time.      58-year-old male presents for evaluation of gradual onset of sharp severe left hip pain.  The pain is constant, radiates towards his left knee when she has chronic pain in.  Is worse with bearing weight and moving his left hip.  No recent falls or trauma, fevers, chills, abdominal pain, back/flank pain, risk factors for dvt/pe      History provided by:  Patient  Hip Pain  Associated symptoms: no abdominal pain, no chest pain, no congestion, no diarrhea, no ear pain, no fatigue, no fever, no myalgias, no nausea, no rash, no rhinorrhea, no shortness of breath, no sore throat, no vomiting and no wheezing        Prior to Admission Medications   Prescriptions Last Dose Informant Patient Reported? Taking?   Blood Pressure KIT 5/12/2024 Self No Yes   Sig: Use 2 (two) times a day   Blood Pressure Monitor KIT 5/12/2024  Yes Yes   Sig: Use   amLODIPine (NORVASC) 10 mg tablet 5/12/2024  No Yes   Sig: TAKE 1 TABLET BY MOUTH EVERY DAY   aspirin (ECOTRIN LOW STRENGTH) 81 mg EC tablet 5/12/2024 Self Yes Yes   Sig: Take 81 mg by mouth daily   atorvastatin (LIPITOR) 40 mg tablet 5/11/2024 Self No Yes   Sig: Take 1 tablet (40 mg total) by mouth daily   b complex vitamins capsule 5/12/2024  Yes Yes   Sig: Take 1 capsule by mouth daily   esomeprazole (NexIUM) 20 mg capsule   No No   Sig: Take 1 capsule (20 mg total) by mouth daily in the early morning   fluticasone (FLONASE) 50 mcg/act nasal spray Not Taking  No No   Sig: SPRAY 1 SPRAY INTO EACH NOSTRIL EVERY DAY   Patient not taking: Reported on 5/12/2024   hydrochlorothiazide (HYDRODIURIL) 25 mg tablet 5/12/2024 Self No Yes   Sig: Take 1 tablet (25 mg total) by mouth daily   losartan (COZAAR) 25 mg tablet 5/11/2024 Self No Yes   Sig: Take 1 tablet (25 mg total) by mouth daily for  180 doses   losartan (Cozaar) 50 mg tablet Not Taking Self No No   Sig: Take 1 tablet (50 mg total) by mouth daily   Patient not taking: Reported on 3/4/2024   metoprolol tartrate (LOPRESSOR) 25 mg tablet 5/12/2024 Self Yes Yes   Sig: Take 12.5 mg by mouth 2 (two) times a day   nicotine polacrilex (NICORETTE) 4 mg gum 5/11/2024 Self No Yes   Sig: Chew 1 each (4 mg total) as needed for smoking cessation   omeprazole (PriLOSEC) 20 mg delayed release capsule 5/11/2024 Self Yes Yes   Sig: Take 20 mg by mouth daily   prasugrel (EFFIENT) tablet 5/12/2024 Self Yes Yes   Sig: Take 5 mg by mouth daily   semaglutide, 0.25 or 0.5 mg/dose, (Ozempic, 0.25 or 0.5 MG/DOSE,) 2 mg/3 mL injection pen Past Week Self No Yes   Sig: Inject 0.75 mL (0.5 mg total) under the skin every 7 days   tamsulosin (FLOMAX) 0.4 mg 5/11/2024 Self Yes Yes   Sig: Take 0.4 mg by mouth      Facility-Administered Medications: None       Past Medical History:   Diagnosis Date    Colon polyp     GERD (gastroesophageal reflux disease)     Hyperlipidemia     Hypertension     Sleep apnea        Past Surgical History:   Procedure Laterality Date    ACHILLES TENDON REPAIR Right     1983 in [place]    BACK SURGERY  2009    herniated L5-S1 disc repair    COLONOSCOPY         Family History   Problem Relation Age of Onset    Hypertension Mother     Hyperlipidemia Mother     Prostate cancer Father     Hyperlipidemia Father     Stroke Father     Diabetes type II Brother     Hypertension Brother     Prostate cancer Brother      I have reviewed and agree with the history as documented.    E-Cigarette/Vaping    E-Cigarette Use Never User      E-Cigarette/Vaping Substances    Nicotine No     THC No     CBD No     Flavoring No     Other No     Unknown No      Social History     Tobacco Use    Smoking status: Some Days     Types: Cigars, Cigarettes    Smokeless tobacco: Never    Tobacco comments:     1 cigar a day   Vaping Use    Vaping status: Never Used   Substance Use  Topics    Alcohol use: Not Currently     Comment: used to drink 2-3x/week 1 bottle of wine    Drug use: Not Currently     Types: Marijuana     Comment: THC 3 times a week       Review of Systems   Constitutional:  Negative for activity change, appetite change, fatigue and fever.   HENT:  Negative for congestion, dental problem, ear pain, rhinorrhea and sore throat.    Eyes:  Negative for pain and redness.   Respiratory:  Negative for chest tightness, shortness of breath and wheezing.    Cardiovascular:  Negative for chest pain and palpitations.   Gastrointestinal:  Negative for abdominal pain, blood in stool, constipation, diarrhea, nausea and vomiting.   Endocrine: Negative for cold intolerance and heat intolerance.   Genitourinary:  Negative for dysuria, frequency and hematuria.   Musculoskeletal:  Negative for arthralgias and myalgias.   Skin:  Negative for color change, pallor and rash.   Neurological:  Negative for weakness and numbness.   Hematological:  Does not bruise/bleed easily.   Psychiatric/Behavioral:  Negative for agitation, hallucinations and suicidal ideas.        Physical Exam  Physical Exam  Vitals and nursing note reviewed.   Constitutional:       Appearance: He is well-developed.   HENT:      Mouth/Throat:      Pharynx: No oropharyngeal exudate.   Eyes:      Conjunctiva/sclera: Conjunctivae normal.   Neck:      Comments: No meningeal signs  Cardiovascular:      Rate and Rhythm: Normal rate and regular rhythm.      Heart sounds: Normal heart sounds.   Pulmonary:      Effort: Pulmonary effort is normal. No respiratory distress.      Breath sounds: Normal breath sounds. No wheezing or rales.   Chest:      Chest wall: No tenderness.   Abdominal:      General: Bowel sounds are normal. There is no distension.      Palpations: Abdomen is soft. There is no mass.      Tenderness: There is no abdominal tenderness.      Hernia: No hernia is present.      Comments: No cvat.  Pelvis is stable, tender ovation  over the diffuse left hip without effusion, erythema, or warmth.  Full range of motion with pain.  Left knee exam is within normal limits.  No lower extremity edema or swelling, neurovascularly intact.   Musculoskeletal:         General: Normal range of motion.      Cervical back: Normal range of motion and neck supple.   Lymphadenopathy:      Cervical: No cervical adenopathy.   Skin:     Findings: No erythema or rash.      Comments: No edema   Neurological:      Mental Status: He is alert and oriented to person, place, and time.      Cranial Nerves: No cranial nerve deficit.   Psychiatric:         Behavior: Behavior normal.         Vital Signs  ED Triage Vitals   Temperature Pulse Respirations Blood Pressure SpO2   05/12/24 1625 05/12/24 1625 05/12/24 1625 05/12/24 1625 05/12/24 1625   98.1 °F (36.7 °C) 60 18 137/74 95 %      Temp Source Heart Rate Source Patient Position - Orthostatic VS BP Location FiO2 (%)   05/12/24 1625 05/12/24 1625 05/12/24 1625 05/12/24 1625 --   Oral Monitor Sitting Right arm       Pain Score       05/12/24 1743       9           Vitals:    05/12/24 1625   BP: 137/74   Pulse: 60   Patient Position - Orthostatic VS: Sitting         Visual Acuity      ED Medications  Medications   lidocaine (LIDODERM) 5 % patch 1 patch (1 patch Topical Medication Applied 5/12/24 1743)   HYDROcodone-acetaminophen (NORCO) 5-325 mg per tablet 1 tablet (1 tablet Oral Given 5/12/24 1743)   ketorolac (TORADOL) injection 15 mg (15 mg Intramuscular Given 5/12/24 2047)   haloperidol lactate (HALDOL) injection 5 mg (5 mg Intramuscular Given 5/12/24 2112)       Diagnostic Studies  Results Reviewed       None                   CT lower extremity wo contrast left   Final Result by Kaleb Jimenez MD (05/12 2055)      No acute fracture. Severe left hip osteoarthrosis. Additional findings as above.         Workstation performed: [id number]         XR hip/pelv 2-3 vws left   ED Interpretation by Kaleb Gudino MD (05/12  1835)   Primary reviewed: No acute abnomrality.                 Procedures  Procedures         ED Course  ED Course as of 05/12/24 2134   Sun May 12, 2024   1840 XR hip/pelv 2-3 vws left  Xray negative, will ct ro r/o fx     2133 Pt able to ambulate, will dc to home with pcp f/u                               SBIRT 22yo+      Flowsheet Row Most Recent Value   Initial Alcohol Screen: US AUDIT-C     1. How often do you have a drink containing alcohol? 0 Filed at: 05/12/2024 1625   2. How many drinks containing alcohol do you have on a typical day you are drinking?  0 Filed at: 05/12/2024 1625   3a. Male UNDER 65: How often do you have five or more drinks on one occasion? 0 Filed at: 05/12/2024 1625   Audit-C Score 0 Filed at: 05/12/2024 1625   RYAN: How many times in the past year have you...    Used an illegal drug or used a prescription medication for non-medical reasons? Never Filed at: 05/12/2024 1625                      Medical Decision Making  Atraumatic left hip pain-no history exam findings suggest septic joint and medical workup is not indicated.  Will do x-ray rule out fracture, treat symptoms, ambulatory challenge.    Amount and/or Complexity of Data Reviewed  Radiology: ordered and independent interpretation performed. Decision-making details documented in ED Course.    Risk  Prescription drug management.             Disposition  Final diagnoses:   Hip sprain, left, initial encounter   Arthritis     Time reflects when diagnosis was documented in both MDM as applicable and the Disposition within this note       Time User Action Codes Description Comment    5/12/2024  9:20 PM Kaleb Gudino Add [S73.102A] Hip sprain, left, initial encounter     5/12/2024  9:20 PM Kaleb Gudino Add [M19.90] Arthritis           ED Disposition       ED Disposition   Discharge    Condition   Stable    Date/Time   Sun May 12, 2024 2119    Comment   Neptali Elias discharge to home/self care.                   Follow-up  Information       Follow up With Specialties Details Why Contact Info Additional Information    [facility] [place] [facility] Schedule an appointment as soon as possible for a visit in 2 days  [address]  [facility] [address] [phone] [facility] [place], [facility] [address], [place], [place], [address] [phone]            Patient's Medications   Discharge Prescriptions    DICLOFENAC SODIUM (VOLTAREN) 1 %    Apply 2 g topically 4 (four) times a day       Start Date: 5/12/2024 End Date: --       Order Dose: 2 g       Quantity: 150 g    Refills: 0    METHYLPREDNISOLONE 4 MG TABLET THERAPY PACK    Use as directed on package       Start Date: 5/12/2024 End Date: --       Order Dose: --       Quantity: 21 tablet    Refills: 0       No discharge procedures on file.    PDMP Review         Value Time User    PDMP Reviewed  Yes 12/22/2020  1:20 PM LYNDON Don            ED Provider  Electronically Signed by             Kaleb Gudino MD  05/12/24 9523

## 2024-05-13 ENCOUNTER — PATIENT OUTREACH (OUTPATIENT)
Dept: FAMILY MEDICINE CLINIC | Facility: CLINIC | Age: 59
End: 2024-05-13

## 2024-05-13 NOTE — ED NOTES
Pt ambulated to end of the badillo and back with cane. No other assistance was needed. Pt stated his pain was much better.      Liset Cortez  05/12/24 7300

## 2024-05-13 NOTE — PROGRESS NOTES
Incoming call IB.    ASHISH SHARMA did receive an IB notification stating that Pt was at ED due to hip pain.    ASHISH SHARMA is remain available for further assistance as needed.   ASHISH SHARMA will continue to follow-up.

## 2024-06-04 ENCOUNTER — TELEPHONE (OUTPATIENT)
Age: 59
End: 2024-06-04

## 2024-06-04 NOTE — TELEPHONE ENCOUNTER
Caller: Neptali     Doctor: Kalen    Reason for call: Is having increased hip pain and is supposed to be having surgery in sept. He would like to know if he would be eligible for another round of Hip injections due to increased pain.    Call back#: [phone]

## 2024-06-04 NOTE — TELEPHONE ENCOUNTER
Hi team, did this patient select a date for surgery? I would like to see if he has time for another IA CSI prior. I do not see him on calendar currently. Please let me know. Thanks!

## 2024-06-06 DIAGNOSIS — M25.552 PAIN IN LEFT HIP: ICD-10-CM

## 2024-06-06 DIAGNOSIS — M16.12 PRIMARY OSTEOARTHRITIS OF ONE HIP, LEFT: Primary | ICD-10-CM

## 2024-06-06 NOTE — TELEPHONE ENCOUNTER
Hello,   I placed an order for left hip IA CSI with Dr Harrisno. Patient is good for this any time after 6/20/24. Patient called and stated he was having surgery in September but he is not on Dr Higuera's surgery schedule. When he calls to schedule IA CSI please make patient aware that he must wait 3 months between injection and surgery. If he has any questions I am happy to speak with him. I tried to call today and got no response so just left a brief voicemail. Thanks!

## 2024-06-07 NOTE — TELEPHONE ENCOUNTER
Left hip injection scheduled 7/9/24.  Patient verbalized understanding that he cannot have surgery until at least 3 months after hip injection.    Reviewed instructions: , NPO 1 hour prior, loose-fitting/comfortable clothes, if ill/fever/infx/abx to call and reschedule.    Patient stated verbal understanding.

## 2024-06-25 ENCOUNTER — HOSPITAL ENCOUNTER (OUTPATIENT)
Dept: RADIOLOGY | Facility: MEDICAL CENTER | Age: 59
Discharge: HOME | End: 2024-06-25
Payer: COMMERCIAL

## 2024-06-25 ENCOUNTER — TELEPHONE (OUTPATIENT)
Age: 59
End: 2024-06-25

## 2024-06-25 VITALS
HEART RATE: 58 BPM | OXYGEN SATURATION: 97 % | DIASTOLIC BLOOD PRESSURE: 76 MMHG | TEMPERATURE: 97.7 F | SYSTOLIC BLOOD PRESSURE: 127 MMHG | RESPIRATION RATE: 18 BRPM

## 2024-06-25 DIAGNOSIS — M16.12 PRIMARY OSTEOARTHRITIS OF ONE HIP, LEFT: ICD-10-CM

## 2024-06-25 DIAGNOSIS — M25.552 PAIN IN LEFT HIP: ICD-10-CM

## 2024-06-25 PROCEDURE — 77002 NEEDLE LOCALIZATION BY XRAY: CPT

## 2024-06-25 PROCEDURE — 77002 NEEDLE LOCALIZATION BY XRAY: CPT | Performed by: PHYSICAL MEDICINE & REHABILITATION

## 2024-06-25 PROCEDURE — 20610 DRAIN/INJ JOINT/BURSA W/O US: CPT | Performed by: PHYSICAL MEDICINE & REHABILITATION

## 2024-06-25 RX ORDER — ROPIVACAINE HYDROCHLORIDE 2 MG/ML
3 INJECTION, SOLUTION EPIDURAL; INFILTRATION; PERINEURAL ONCE
Status: COMPLETED | OUTPATIENT
Start: 2024-06-25 | End: 2024-06-25

## 2024-06-25 RX ORDER — METHYLPREDNISOLONE ACETATE 40 MG/ML
40 INJECTION, SUSPENSION INTRA-ARTICULAR; INTRALESIONAL; INTRAMUSCULAR; PARENTERAL; SOFT TISSUE ONCE
Status: COMPLETED | OUTPATIENT
Start: 2024-06-25 | End: 2024-06-25

## 2024-06-25 RX ADMIN — ROPIVACAINE HYDROCHLORIDE 3 ML: 2 INJECTION, SOLUTION EPIDURAL; INFILTRATION; PERINEURAL at 10:33

## 2024-06-25 RX ADMIN — METHYLPREDNISOLONE ACETATE 40 MG: 40 INJECTION, SUSPENSION INTRA-ARTICULAR; INTRALESIONAL; INTRAMUSCULAR; PARENTERAL; SOFT TISSUE at 10:33

## 2024-06-25 RX ADMIN — IOHEXOL 2 ML: 300 INJECTION, SOLUTION INTRAVENOUS at 10:33

## 2024-06-25 NOTE — DISCHARGE INSTRUCTIONS

## 2024-06-25 NOTE — H&P
History of Present Illness: The patient is a 59 y.o. male who presents with complaints of left hip pain    Past Medical History:   Diagnosis Date    Colon polyp     GERD (gastroesophageal reflux disease)     Hyperlipidemia     Hypertension     Sleep apnea        Past Surgical History:   Procedure Laterality Date    ACHILLES TENDON REPAIR Right     1983 in [place]    BACK SURGERY  2009    herniated L5-S1 disc repair    COLONOSCOPY           Current Outpatient Medications:     amLODIPine (NORVASC) 10 mg tablet, TAKE 1 TABLET BY MOUTH EVERY DAY, Disp: 90 tablet, Rfl: 1    aspirin (ECOTRIN LOW STRENGTH) 81 mg EC tablet, Take 81 mg by mouth daily, Disp: , Rfl:     atorvastatin (LIPITOR) 40 mg tablet, Take 1 tablet (40 mg total) by mouth daily, Disp: 90 tablet, Rfl: 1    b complex vitamins capsule, Take 1 capsule by mouth daily, Disp: , Rfl:     Blood Pressure KIT, Use 2 (two) times a day, Disp: 1 kit, Rfl: 0    Blood Pressure Monitor KIT, Use, Disp: , Rfl:     Diclofenac Sodium (VOLTAREN) 1 %, Apply 2 g topically 4 (four) times a day, Disp: 150 g, Rfl: 0    esomeprazole (NexIUM) 20 mg capsule, Take 1 capsule (20 mg total) by mouth daily in the early morning, Disp: 30 capsule, Rfl: 1    fluticasone (FLONASE) 50 mcg/act nasal spray, SPRAY 1 SPRAY INTO EACH NOSTRIL EVERY DAY (Patient not taking: Reported on 5/12/2024), Disp: 16 mL, Rfl: 0    hydrochlorothiazide (HYDRODIURIL) 25 mg tablet, Take 1 tablet (25 mg total) by mouth daily, Disp: 30 tablet, Rfl: 1    losartan (COZAAR) 25 mg tablet, Take 1 tablet (25 mg total) by mouth daily for 180 doses, Disp: 90 tablet, Rfl: 1    losartan (Cozaar) 50 mg tablet, Take 1 tablet (50 mg total) by mouth daily (Patient not taking: Reported on 3/4/2024), Disp: 30 tablet, Rfl: 5    methylPREDNISolone 4 MG tablet therapy pack, Use as directed on package, Disp: 21 tablet, Rfl: 0    metoprolol tartrate (LOPRESSOR) 25 mg tablet, Take 12.5 mg by mouth 2 (two) times a day, Disp: , Rfl:      nicotine polacrilex (NICORETTE) 4 mg gum, Chew 1 each (4 mg total) as needed for smoking cessation, Disp: 100 each, Rfl: 0    omeprazole (PriLOSEC) 20 mg delayed release capsule, Take 20 mg by mouth daily, Disp: , Rfl:     prasugrel (EFFIENT) tablet, Take 5 mg by mouth daily, Disp: , Rfl:     semaglutide, 0.25 or 0.5 mg/dose, (Ozempic, 0.25 or 0.5 MG/DOSE,) 2 mg/3 mL injection pen, Inject 0.75 mL (0.5 mg total) under the skin every 7 days, Disp: 0.5 mL, Rfl: 0    tamsulosin (FLOMAX) 0.4 mg, Take 0.4 mg by mouth, Disp: , Rfl:     No Known Allergies    Physical Exam:   Vitals:    06/25/24 1015   BP: 118/70   Pulse: 61   Resp: 16   Temp: 97.7 °F (36.5 °C)   SpO2: 95%     General: Awake, Alert, Oriented x 3, Mood and affect appropriate  Respiratory: Respirations even and unlabored  Cardiovascular: Peripheral pulses intact; no edema  Musculoskeletal Exam: left hip pain    ASA Score: 2    Patient/Chart Verification  Patient ID Verified: Verbal  Consents Confirmed: Procedural, To be obtained in the Pre-Procedure area  H&P( within 30 days) Verified: To be obtained in the Pre-Procedure area  Allergies Reviewed: Yes  Anticoag/NSAID held?: NA  Currently on antibiotics?: No  Pregnancy denied?: NA    Assessment:   1. Primary osteoarthritis of one hip, left    2. Pain in left hip        Plan: left hip IA CSI under imaging guidance, left hip OA

## 2024-06-26 ENCOUNTER — TELEPHONE (OUTPATIENT)
Dept: GASTROENTEROLOGY | Facility: CLINIC | Age: 59
End: 2024-06-26

## 2024-06-26 NOTE — TELEPHONE ENCOUNTER
Left voicemail informing pt that Dr. Everett is no longer at [facility] on Sept 5th moved to     Pt is still scheduled with Dr. Everett for the colonoscopy on Sept 5th but not at

## 2024-06-26 NOTE — TELEPHONE ENCOUNTER
Pt called back and I relayed the message of the colonoscopy being moved to [facility]. Pt understood and had no further questions

## 2024-07-09 ENCOUNTER — TELEPHONE (OUTPATIENT)
Dept: PAIN MEDICINE | Facility: CLINIC | Age: 59
End: 2024-07-09

## 2024-07-22 ENCOUNTER — OFFICE VISIT (OUTPATIENT)
Dept: OBGYN CLINIC | Facility: MEDICAL CENTER | Age: 59
End: 2024-07-22
Payer: COMMERCIAL

## 2024-07-22 VITALS
WEIGHT: 201 LBS | SYSTOLIC BLOOD PRESSURE: 170 MMHG | HEIGHT: 70 IN | DIASTOLIC BLOOD PRESSURE: 92 MMHG | HEART RATE: 68 BPM | BODY MASS INDEX: 28.77 KG/M2

## 2024-07-22 DIAGNOSIS — M25.562 CHRONIC PAIN OF LEFT KNEE: Primary | ICD-10-CM

## 2024-07-22 DIAGNOSIS — M17.12 PRIMARY OSTEOARTHRITIS OF LEFT KNEE: ICD-10-CM

## 2024-07-22 DIAGNOSIS — G89.29 CHRONIC PAIN OF LEFT KNEE: Primary | ICD-10-CM

## 2024-07-22 PROCEDURE — 99213 OFFICE O/P EST LOW 20 MIN: CPT | Performed by: EMERGENCY MEDICINE

## 2024-07-22 PROCEDURE — 20610 DRAIN/INJ JOINT/BURSA W/O US: CPT | Performed by: EMERGENCY MEDICINE

## 2024-07-22 RX ORDER — ESCITALOPRAM OXALATE 10 MG/1
10 TABLET ORAL DAILY
COMMUNITY
Start: 2024-04-24

## 2024-07-22 RX ORDER — TRIAMCINOLONE ACETONIDE 40 MG/ML
40 INJECTION, SUSPENSION INTRA-ARTICULAR; INTRAMUSCULAR
Status: COMPLETED | OUTPATIENT
Start: 2024-07-22 | End: 2024-07-22

## 2024-07-22 RX ORDER — ROPIVACAINE HYDROCHLORIDE 2 MG/ML
4 INJECTION, SOLUTION EPIDURAL; INFILTRATION; PERINEURAL
Status: SHIPPED | OUTPATIENT
Start: 2024-07-22

## 2024-07-22 RX ORDER — SEMAGLUTIDE 0.5 MG/.5ML
INJECTION, SOLUTION SUBCUTANEOUS
COMMUNITY
Start: 2024-06-07

## 2024-07-22 RX ORDER — SEMAGLUTIDE 0.5 MG/.5ML
INJECTION, SOLUTION SUBCUTANEOUS
COMMUNITY
Start: 2024-05-10

## 2024-07-22 RX ADMIN — TRIAMCINOLONE ACETONIDE 40 MG: 40 INJECTION, SUSPENSION INTRA-ARTICULAR; INTRAMUSCULAR at 13:00

## 2024-07-22 RX ADMIN — ROPIVACAINE HYDROCHLORIDE 4 ML: 2 INJECTION, SOLUTION EPIDURAL; INFILTRATION; PERINEURAL at 14:46

## 2024-07-22 NOTE — PROGRESS NOTES
Assessment/Plan:    Diagnoses and all orders for this visit:    Chronic pain of left knee  -     Large joint arthrocentesis: L knee  -     ropivacaine (NAROPIN) injection 4 mL    Primary osteoarthritis of left knee  -     Large joint arthrocentesis: L knee  -     ropivacaine (NAROPIN) injection 4 mL    Other orders  -     escitalopram (LEXAPRO) 10 mg tablet; Take 10 mg by mouth daily  -     Wegovy 0.5 MG/0.5ML; INJECT 1 MG UNDER THE SKIN EVERY 7 DAYS  -     Wegovy 0.5 MG/0.5ML; INJECT 0.5MG UNDER THE SKIN EVERY 7 DAYS    Repeat CSI left knee  Provided [facility] information  F/U with Ortho for left hip   Reviewed ER notes    Return in about 3 months (around 10/22/2024).      Subjective:   Patient ID: Neptali Elias is a 59 y.o. male.    Patient returns for Left knee pain hoping for CSI  Since last eval he was seen in ER for hip pain and has undergone FL guided CSI    Previous note 4/2024:  Patient returns to the office for chronic left knee pain status post intra-articular CSI performed last visit January 2024.  However since the last evaluation he has been treated for a CVA.  He was also evaluated by orthopedic hip and knee specialist for osteoarthritis and pain decision was to hold off on hip replacement at this time      Review of Systems    The following portions of the patient's chart were reviewed and updated as appropriate:   Allergy:  No Known Allergies    Medications:    Current Outpatient Medications:   •  amLODIPine (NORVASC) 10 mg tablet, TAKE 1 TABLET BY MOUTH EVERY DAY, Disp: 90 tablet, Rfl: 1  •  aspirin (ECOTRIN LOW STRENGTH) 81 mg EC tablet, Take 81 mg by mouth daily, Disp: , Rfl:   •  atorvastatin (LIPITOR) 40 mg tablet, Take 1 tablet (40 mg total) by mouth daily, Disp: 90 tablet, Rfl: 1  •  Blood Pressure KIT, Use 2 (two) times a day, Disp: 1 kit, Rfl: 0  •  Blood Pressure Monitor KIT, Use, Disp: , Rfl:   •  Diclofenac Sodium (VOLTAREN) 1 %, Apply 2 g topically 4 (four) times a day, Disp:  150 g, Rfl: 0  •  escitalopram (LEXAPRO) 10 mg tablet, Take 10 mg by mouth daily, Disp: , Rfl:   •  hydrochlorothiazide (HYDRODIURIL) 25 mg tablet, Take 1 tablet (25 mg total) by mouth daily, Disp: 30 tablet, Rfl: 1  •  methylPREDNISolone 4 MG tablet therapy pack, Use as directed on package, Disp: 21 tablet, Rfl: 0  •  metoprolol tartrate (LOPRESSOR) 25 mg tablet, Take 12.5 mg by mouth 2 (two) times a day, Disp: , Rfl:   •  nicotine polacrilex (NICORETTE) 4 mg gum, Chew 1 each (4 mg total) as needed for smoking cessation, Disp: 100 each, Rfl: 0  •  omeprazole (PriLOSEC) 20 mg delayed release capsule, Take 20 mg by mouth daily, Disp: , Rfl:   •  prasugrel (EFFIENT) tablet, Take 5 mg by mouth daily, Disp: , Rfl:   •  semaglutide, 0.25 or 0.5 mg/dose, (Ozempic, 0.25 or 0.5 MG/DOSE,) 2 mg/3 mL injection pen, Inject 0.75 mL (0.5 mg total) under the skin every 7 days, Disp: 0.5 mL, Rfl: 0  •  tamsulosin (FLOMAX) 0.4 mg, Take 0.4 mg by mouth, Disp: , Rfl:   •  Wegovy 0.5 MG/0.5ML, INJECT 1 MG UNDER THE SKIN EVERY 7 DAYS, Disp: , Rfl:   •  Wegovy 0.5 MG/0.5ML, INJECT 0.5MG UNDER THE SKIN EVERY 7 DAYS, Disp: , Rfl:   •  b complex vitamins capsule, Take 1 capsule by mouth daily, Disp: , Rfl:   •  esomeprazole (NexIUM) 20 mg capsule, Take 1 capsule (20 mg total) by mouth daily in the early morning, Disp: 30 capsule, Rfl: 1  •  fluticasone (FLONASE) 50 mcg/act nasal spray, SPRAY 1 SPRAY INTO EACH NOSTRIL EVERY DAY (Patient not taking: Reported on 5/12/2024), Disp: 16 mL, Rfl: 0  •  losartan (COZAAR) 25 mg tablet, Take 1 tablet (25 mg total) by mouth daily for 180 doses, Disp: 90 tablet, Rfl: 1  •  losartan (Cozaar) 50 mg tablet, Take 1 tablet (50 mg total) by mouth daily (Patient not taking: Reported on 3/4/2024), Disp: 30 tablet, Rfl: 5    Current Facility-Administered Medications:   •  ropivacaine (NAROPIN) injection 4 mL, 4 mL, Intra-articular, Titrated, , 4 mL at 07/22/24 1446    Patient Active Problem List   Diagnosis   •  "Essential hypertension   • LOUIE (obstructive sleep apnea)   • Chronic midline low back pain   • Class 2 obesity in adult   • Mixed hyperlipidemia   • Gastroesophageal reflux disease   • Difficulty using continuous positive airway pressure (CPAP) device   • Primary osteoarthritis of one hip, left   • Mood changes   • Nocturia   • Extremity numbness   • Fatigue   • Ulnar neuropathy of both upper extremities   • Bilateral carpal tunnel syndrome   • Swelling of right hand   • Calcification of right carotid artery   • Pain in left hip   • Sliding hiatal hernia   • External hemorrhoids   • Diverticulosis   • BMI 32.0-32.9,adult   • Allergies   • Encounter for support and coordination of transition of care   • Cerebrovascular accident (CVA) (HCC)   • Tobacco use disorder       Objective:  /92   Pulse 68   Ht 5' 10\" (1.778 m)   Wt 91.2 kg (201 lb)   BMI 28.84 kg/m²     Left Knee Exam     Other   Erythema: absent          Physical Exam      Neurologic Exam    Large joint arthrocentesis: L knee  Universal Protocol:  Consent: Verbal consent obtained.  Risks and benefits: risks, benefits and alternatives were discussed  Consent given by: patient  Time out: Immediately prior to procedure a \"time out\" was called to verify the correct patient, procedure, equipment, support staff and site/side marked as required.  Timeout called at: 7/22/2024 1:23 PM.  Patient understanding: patient states understanding of the procedure being performed  Test results: test results available and properly labeled  Site marked: the operative site was marked  Patient identity confirmed: verbally with patient  Supporting Documentation  Indications: pain   Procedure Details  Location: knee - L knee  Preparation: Patient was prepped and draped in the usual sterile fashion  Needle size: 22 G  Ultrasound guidance: no  Approach: anterolateral  Medications administered: 40 mg triamcinolone acetonide 40 mg/mL    Patient tolerance: patient tolerated the " procedure well with no immediate complications  Dressing:  Sterile dressing applied    No erythema of knee(s)      I have personally reviewed the written report of the pertinent studies.             Past Medical History:   Diagnosis Date   • Colon polyp    • GERD (gastroesophageal reflux disease)    • Hyperlipidemia    • Hypertension    • Sleep apnea        Past Surgical History:   Procedure Laterality Date   • ACHILLES TENDON REPAIR Right     1983 in [place]   • BACK SURGERY  2009    herniated L5-S1 disc repair   • COLONOSCOPY         Social History     Socioeconomic History   • Marital status: Single     Spouse name: Not on file   • Number of children: Not on file   • Years of education: Not on file   • Highest education level: Not on file   Occupational History   • Not on file   Tobacco Use   • Smoking status: Some Days     Types: Cigars, Cigarettes   • Smokeless tobacco: Never   • Tobacco comments:     1 cigar a day   Vaping Use   • Vaping status: Never Used   Substance and Sexual Activity   • Alcohol use: Not Currently     Comment: used to drink 2-3x/week 1 bottle of wine   • Drug use: Not Currently     Types: Marijuana     Comment: THC 3 times a week   • Sexual activity: Yes     Partners: Female     Comment: same for 25 years   Other Topics Concern   • Not on file   Social History Narrative    2024    Lives in apartment    No pets     Lives with wife and father      Social Determinants of Health     Financial Resource Strain: Low Risk  (1/16/2024)    [facility]    Overall Financial Resource Strain (CARDIA)    • Difficulty of Paying Living Expenses: Not hard at all   Food Insecurity: No Food Insecurity (1/16/2024)    [facility]    Hunger Vital Sign    • Worried About Running Out of Food in the Last Year: Never true    • Ran Out of Food in the Last Year: Never true   Transportation Needs: No  Transportation Needs (1/16/2024)    [facility]    PRAPARE - Transportation    • Lack of Transportation (Medical): No    • Lack of Transportation (Non-Medical): No   Physical Activity: Not on file   Stress: Low Risk  (8/11/2021)    Received from [facility]    Stress    • Stress Risk: Not on file    • General Anxiety Disorder (UMU-7) Score: Not on file   Social Connections: Not on file   Intimate Partner Violence: Not At Risk (1/16/2024)    [facility]    Humiliation, Afraid, Rape, and Kick questionnaire    • Fear of Current or Ex-Partner: No    • Emotionally Abused: No    • Physically Abused: No    • Sexually Abused: No   Housing Stability: Low Risk  (1/16/2024)    [facility]    Housing Stability Vital Sign    • Unable to Pay for Housing in the Last Year: No    • Number of Places Lived in the Last Year: 1    • Unstable Housing in the Last Year: No       Family History   Problem Relation Age of Onset   • Hypertension Mother    • Hyperlipidemia Mother    • Prostate cancer Father    • Hyperlipidemia Father    • Stroke Father    • Diabetes type II Brother    • Hypertension Brother    • Prostate cancer Brother

## 2024-07-23 ENCOUNTER — OFFICE VISIT (OUTPATIENT)
Dept: OBGYN CLINIC | Facility: MEDICAL CENTER | Age: 59
End: 2024-07-23
Payer: COMMERCIAL

## 2024-07-23 ENCOUNTER — TELEPHONE (OUTPATIENT)
Dept: OBGYN CLINIC | Facility: HOSPITAL | Age: 59
End: 2024-07-23

## 2024-07-23 VITALS
BODY MASS INDEX: 28.37 KG/M2 | WEIGHT: 198.2 LBS | SYSTOLIC BLOOD PRESSURE: 146 MMHG | HEART RATE: 60 BPM | HEIGHT: 70 IN | DIASTOLIC BLOOD PRESSURE: 85 MMHG

## 2024-07-23 DIAGNOSIS — F17.200 SMOKER: ICD-10-CM

## 2024-07-23 DIAGNOSIS — M16.12 PRIMARY OSTEOARTHRITIS OF ONE HIP, LEFT: Primary | ICD-10-CM

## 2024-07-23 PROCEDURE — 99214 OFFICE O/P EST MOD 30 MIN: CPT | Performed by: ORTHOPAEDIC SURGERY

## 2024-07-23 NOTE — TELEPHONE ENCOUNTER
Caller: Neptali    Doctor: Kalen    Reason for call: Patient is calling back with the name of his Blood Thinner Medication.    Prasugrel 5 mg Q Daily    Call back#: [phone]

## 2024-07-23 NOTE — PROGRESS NOTES
Assessment & Plan     1. Primary osteoarthritis of one hip, left    2. Smoker      Orders Placed This Encounter   Procedures    Nicotine, Blood    Ambulatory Referral to Physical Therapy     Patient is experiencing severe left hip osteoarthritis, recent stroke, smoker.  Discussed with patient that he is not a surgical candidate due to continued smoking.  Patient continues to work on smoking and states he is moving in the right direction.  He will also see which blood thinner he is taking and see if he can come off of it for surgery.  Nicotine blood test was ordered at today's visit for patient to undergo blood test 2 weeks after quitting smoking.  Physical therapy referral was given to patient for him to go for 1 session to learn HEP.  I discussed with patient that he may call office if he would like to repeat left hip IA CSI after 9/25/2024.  Patient states she received great symptomatic relief from left hip CSI administered 6/25/2024.  Patient may follow-up after patient has quit smoking and has nicotine test done to discuss left ADDISON.    No follow-ups on file.    I answered all of the patient's questions during the visit and provided education of the patient's condition during the visit.  The patient verbalized understanding of the information given and agrees with the plan.  This note was dictated using Vi[employer]ia software.  It may contain errors including improperly dictated words.  Please contact physician directly for any questions.    Subjective   Chief Complaint:   Chief Complaint   Patient presents with    Left Hip - Follow-up       HPI:  Neptali Elias is a 59 y.o. male who presents for follow up for severe left hip osteoarthritis.  Patient was last seen 1/23/2024 where we advised patient to continue to follow-up with cardiology, neurology, internal medicine to address recent stroke.  Patient states he was able to go for left hip IA CSI on 6/25/2024 where he states that it gave him 80% relief.   Patient states he has been able to get improvement with this gait and sleep after receiving steroid injection but states he does have occasional pain with certain activities.  Patient states he has not participated in any recent physical therapy.  Patient has been taking Tylenol as needed for pain control as he cannot take NSAIDs due to being on blood thinners.  Patient states he continues to follow-up with cardiology, neurology, internal medicine in regards to his stroke back in 1/13/2024.  Patient states he continues to work on quitting smoking and has been going to program twice a week to work on quitting smoking.  Patient is interested and surgical discussion today.    History of MRSA: no  History of blood clots: had stroke 1/13/24   Family history of blood clots: no  History of Hepatitis C:no  History of HIV: no  Are you a smoker:yes, continues to work on quitting.   Are you diabetic:no  Do you see a cardiologist: yes  Have you seen a dentist in the past year: no  Do you have a leg length discrepancy:  yes         Review of Systems  See [facility] for musculoskeletal review.   All other systems reviewed are negative     History:  Past Medical History:   Diagnosis Date    Colon polyp     GERD (gastroesophageal reflux disease)     Hyperlipidemia     Hypertension     Sleep apnea      Past Surgical History:   Procedure Laterality Date    ACHILLES TENDON REPAIR Right     1983 in [place]    BACK SURGERY  2009    herniated L5-S1 disc repair    COLONOSCOPY       Social History   Social History     Substance and Sexual Activity   Alcohol Use Not Currently    Comment: used to drink 2-3x/week 1 bottle of wine     Social History     Substance and Sexual Activity   Drug Use Not Currently    Types: Marijuana    Comment: THC 3 times a week     Social History     Tobacco Use   Smoking Status Some Days    Types: Cigars, Cigarettes   Smokeless Tobacco Never   Tobacco Comments    1 cigar a day     Family History:   Family History    Problem Relation Age of Onset    Hypertension Mother     Hyperlipidemia Mother     Prostate cancer Father     Hyperlipidemia Father     Stroke Father     Diabetes type II Brother     Hypertension Brother     Prostate cancer Brother        Current Outpatient Medications on File Prior to Visit   Medication Sig Dispense Refill    amLODIPine (NORVASC) 10 mg tablet TAKE 1 TABLET BY MOUTH EVERY DAY 90 tablet 1    aspirin (ECOTRIN LOW STRENGTH) 81 mg EC tablet Take 81 mg by mouth daily      atorvastatin (LIPITOR) 40 mg tablet Take 1 tablet (40 mg total) by mouth daily 90 tablet 1    b complex vitamins capsule Take 1 capsule by mouth daily      Blood Pressure KIT Use 2 (two) times a day 1 kit 0    Blood Pressure Monitor KIT Use      Diclofenac Sodium (VOLTAREN) 1 % Apply 2 g topically 4 (four) times a day 150 g 0    escitalopram (LEXAPRO) 10 mg tablet Take 10 mg by mouth daily      esomeprazole (NexIUM) 20 mg capsule Take 1 capsule (20 mg total) by mouth daily in the early morning 30 capsule 1    fluticasone (FLONASE) 50 mcg/act nasal spray SPRAY 1 SPRAY INTO EACH NOSTRIL EVERY DAY (Patient not taking: Reported on 5/12/2024) 16 mL 0    hydrochlorothiazide (HYDRODIURIL) 25 mg tablet Take 1 tablet (25 mg total) by mouth daily 30 tablet 1    losartan (COZAAR) 25 mg tablet Take 1 tablet (25 mg total) by mouth daily for 180 doses 90 tablet 1    losartan (Cozaar) 50 mg tablet Take 1 tablet (50 mg total) by mouth daily (Patient not taking: Reported on 3/4/2024) 30 tablet 5    methylPREDNISolone 4 MG tablet therapy pack Use as directed on package 21 tablet 0    metoprolol tartrate (LOPRESSOR) 25 mg tablet Take 12.5 mg by mouth 2 (two) times a day      nicotine polacrilex (NICORETTE) 4 mg gum Chew 1 each (4 mg total) as needed for smoking cessation 100 each 0    omeprazole (PriLOSEC) 20 mg delayed release capsule Take 20 mg by mouth daily      prasugrel (EFFIENT) tablet Take 5 mg by mouth daily      semaglutide, 0.25 or 0.5  "mg/dose, (Ozempic, 0.25 or 0.5 MG/DOSE,) 2 mg/3 mL injection pen Inject 0.75 mL (0.5 mg total) under the skin every 7 days 0.5 mL 0    tamsulosin (FLOMAX) 0.4 mg Take 0.4 mg by mouth      Wegovy 0.5 MG/0.5ML INJECT 1 MG UNDER THE SKIN EVERY 7 DAYS      Wegovy 0.5 MG/0.5ML INJECT 0.5MG UNDER THE SKIN EVERY 7 DAYS       Current Facility-Administered Medications on File Prior to Visit   Medication Dose Route Frequency Provider Last Rate Last Admin    ropivacaine (NAROPIN) injection 4 mL  4 mL Intra-articular Titrated    4 mL at 07/22/24 1446     No Known Allergies     Objective     /85   Pulse 60   Ht 5' 10\" (1.778 m)   Wt 89.9 kg (198 lb 3.2 oz)   BMI 28.44 kg/m²      PE:  AAOx 3  WDWN  Hearing intact, no drainage from eyes  no audible wheezing  no abdominal distension  LE compartments soft, skin intact    left hip:   No dislocation/deformity  Pos. [facility]  ROM: 90 flexion, 35 ER, 10 IR pain in all directions  Pos. Florian Test  pos. Impingement test  No TTP over greater trochanter  Abduction: 5/5  Neg. Xena's test  mild TTP over SIJ    AT/GS intact    Scribe Attestation      I,:  Navin Perea am acting as a scribe while in the presence of the attending physician.:       I,:  Rosalia Higuera DO personally performed the services described in this documentation    as scribed in my presence.:               "

## 2024-07-23 NOTE — TELEPHONE ENCOUNTER
Noted. Patient is to discuss with his PCP if he can come off blood thinner in the merle op period.

## 2024-07-23 NOTE — TELEPHONE ENCOUNTER
Caller: Patient    Doctor: Kalen    Reason for call: Patient stated Dr. Higuera has to call his doctor and request for him to stop taking blood thinners before his surgery.    Call back#: [phone]

## 2024-08-15 ENCOUNTER — TELEPHONE (OUTPATIENT)
Dept: GASTROENTEROLOGY | Facility: CLINIC | Age: 59
End: 2024-08-15

## 2024-08-15 NOTE — TELEPHONE ENCOUNTER
[facility] she provide a new fax number, and she says she will forward the message to the patient PA. Send requested

## 2024-08-20 ENCOUNTER — EVALUATION (OUTPATIENT)
Dept: PHYSICAL THERAPY | Facility: CLINIC | Age: 59
End: 2024-08-20
Payer: COMMERCIAL

## 2024-08-20 DIAGNOSIS — M16.12 PRIMARY OSTEOARTHRITIS OF ONE HIP, LEFT: ICD-10-CM

## 2024-08-20 PROCEDURE — 97110 THERAPEUTIC EXERCISES: CPT | Performed by: PHYSICAL THERAPIST

## 2024-08-20 PROCEDURE — 97112 NEUROMUSCULAR REEDUCATION: CPT | Performed by: PHYSICAL THERAPIST

## 2024-08-20 PROCEDURE — 97161 PT EVAL LOW COMPLEX 20 MIN: CPT | Performed by: PHYSICAL THERAPIST

## 2024-08-20 NOTE — PROGRESS NOTES
PT Evaluation     Today's date: 2024  Patient name: Neptali Elias  : 1965  MRN: [id number]  Referring provider: Rosalia Higuera,*  Dx:   Encounter Diagnosis     ICD-10-CM    1. Primary osteoarthritis of one hip, left  M16.12 Ambulatory Referral to Physical Therapy                     Assessment  Impairments: abnormal gait, abnormal muscle firing, abnormal or restricted ROM, activity intolerance, impaired physical strength, lacks appropriate home exercise program, pain with function, weight-bearing intolerance, poor body mechanics, unable to perform ADL, activity limitations and endurance  Symptom irritability: moderate    Assessment details: Pt is a 59 y.o. year old male presenting to physical therapy for Primary osteoarthritis of one hip, left. Pt was referred to PT for 1 visit to supply a HEP. He presents with the following impairments: limited ROM globally L hip, reduced strength globally L hip, pain with WB, positional pain at night, pain with ADLs and recreational activities affecting his function with ambulating, ADLs, squatting, recreational activities. Pt was given HEP to promote hip strength and ROM to improve functional mobility with ADLs and pain levels. Pt states he will be consistent with HEP. Pt told to reach out to office if he has any questions.   Understanding of Dx/Px/POC: good     Prognosis: good    Goals  One time visit for HEP    Plan  Patient would benefit from: PT eval and skilled physical therapy    Planned therapy interventions: abdominal trunk stabilization, flexibility, functional ROM exercises and home exercise program    Frequency: Pt given HEP to conduct at home. No more visits scheduled.  Treatment plan discussed with: patient      Subjective Evaluation    History of Present Illness  Mechanism of injury: Pt presents to the clinic with complaints of L hip pain that has been bothering him for around 2 years. Pt reports that he had x-rays which show severe  "degenerative damage to the hip joint. Pt states doctor is withholding surgery due to continued smoking. He is going to be doing a \"breathing lab\" to see if he qualifies for surgery due to smoking. Pt states pain is over GT consistently with no radiating features. Pt has trouble walking and sleeping. Feels his LLE has gotten much weaker than the R. Pt states he gets injections every 3 mos which help greatly.   Patient Goals  Patient goal: getting stronger and reduce pain  Pain  Current pain rating: 3  At best pain ratin  At worst pain ratin  Quality: throbbing  Relieving factors: medications and ice  Aggravating factors: stair climbing, walking and standing      Diagnostic Tests  X-ray: abnormal      Objective     Concurrent Complaints  Positive for night pain.     Additional Special Questions  Night pain is positional    Postural Observations  Seated posture: fair  Standing posture: poor      Observations     Additional Observation Details  Observed leg length discrepancy: L longer than R     Tenderness     Left Hip   Tenderness in the ASIS, PSIS and greater trochanter.     Passive Range of Motion   Left Hip   Flexion: 95 degrees with pain  Abduction: 25 degrees with pain  External rotation (90/90): WFL  Internal rotation (90/90): 20 degrees with pain    Joint Play   Left Hip   Hypomobile in the long axis distraction.    Strength/Myotome Testing     Left Hip   Planes of Motion   Flexion: 4-  Abduction: 4  Adduction: 4-    Right Hip   Planes of Motion   Flexion: 5  Extension: 5  Abduction: 5  Adduction: 5    Left Knee   Flexion: 4-  Extension: 4-    Right Knee   Extension: 4+ and WFL    Left Ankle/Foot   Dorsiflexion: 4+  Plantar flexion: 4+    Right Ankle/Foot   Dorsiflexion: 4+  Plantar flexion: 4+    Tests     Left Hip   Positive DELILAH LIVINGSTON and lizzy.   Les: Positive.     Ambulation     Quality of Movement During Gait   Trunk    Trunk (Left): Positive left lateral lean over stance limb. " "    Pelvis    Pelvis (Left): Positive Trendelenburg.     Functional Assessment      Squat    Trunk lean right.              Manuals 8/20            LAD NH            HS stretch NH            Les stretch NH                         Neuro Re-Ed             Clamshells  2x10            SLR 2x10                                                                             Ther Ex             Self HS stretch 3x30\"            Supported marching 2x30            Mini squats 2x10            SL hip abd 2x10                                                                Ther Activity                                       Gait Training                                       Modalities                                            "

## 2024-08-27 ENCOUNTER — PATIENT OUTREACH (OUTPATIENT)
Dept: FAMILY MEDICINE CLINIC | Facility: CLINIC | Age: 59
End: 2024-08-27

## 2024-08-27 NOTE — PROGRESS NOTES
ASHISH SHARMA placed a call to Pt for follow-up, introduced herself, her role and explained reason for call. Pt seems understanding and expressed that when he requested assistance for his dad was to apply for Waiver. Pt stated that there is a CW already involved and is helping his dad with Waiver process. ASHISH SHARMA explained Pt that this case will be closed today however he can reach out the ASHISH SHARMA for further support Monday through Friday within office hours. Pt seems understanding and thankful for the ASHISH SHARMA support.     ASHISH SHARMA is remain available for further assistance as needed.

## 2024-08-29 ENCOUNTER — TELEPHONE (OUTPATIENT)
Age: 59
End: 2024-08-29

## 2024-08-29 NOTE — TELEPHONE ENCOUNTER
Patients GI provider:  Dr. Everett    Number to return call: [phone]    Reason for call: Pt calling stating he was cleared to stop medication for 5 days prior. Clearance is scanned in chart.  Also advised he needed to not take his Wegovy from now until after his procedure.  Emailed instructions to him     Scheduled procedure/appointment date if applicable: 9/5/24

## 2024-09-05 ENCOUNTER — ANESTHESIA (OUTPATIENT)
Dept: GASTROENTEROLOGY | Facility: HOSPITAL | Age: 59
End: 2024-09-05

## 2024-09-05 ENCOUNTER — TELEPHONE (OUTPATIENT)
Dept: GASTROENTEROLOGY | Facility: HOSPITAL | Age: 59
End: 2024-09-05

## 2024-09-05 ENCOUNTER — ANESTHESIA EVENT (OUTPATIENT)
Dept: GASTROENTEROLOGY | Facility: HOSPITAL | Age: 59
End: 2024-09-05

## 2024-09-05 ENCOUNTER — HOSPITAL ENCOUNTER (OUTPATIENT)
Dept: GASTROENTEROLOGY | Facility: HOSPITAL | Age: 59
Setting detail: OUTPATIENT SURGERY
End: 2024-09-05
Attending: INTERNAL MEDICINE
Payer: COMMERCIAL

## 2024-09-05 VITALS
TEMPERATURE: 97.4 F | RESPIRATION RATE: 16 BRPM | SYSTOLIC BLOOD PRESSURE: 161 MMHG | DIASTOLIC BLOOD PRESSURE: 87 MMHG | HEART RATE: 49 BPM | OXYGEN SATURATION: 99 %

## 2024-09-05 DIAGNOSIS — Z86.010 PERSONAL HISTORY OF COLONIC POLYPS: ICD-10-CM

## 2024-09-05 PROCEDURE — 45385 COLONOSCOPY W/LESION REMOVAL: CPT | Performed by: INTERNAL MEDICINE

## 2024-09-05 PROCEDURE — 88305 TISSUE EXAM BY PATHOLOGIST: CPT | Performed by: PATHOLOGY

## 2024-09-05 RX ORDER — SODIUM CHLORIDE, SODIUM LACTATE, POTASSIUM CHLORIDE, CALCIUM CHLORIDE 600; 310; 30; 20 MG/100ML; MG/100ML; MG/100ML; MG/100ML
INJECTION, SOLUTION INTRAVENOUS CONTINUOUS PRN
Status: DISCONTINUED | OUTPATIENT
Start: 2024-09-05 | End: 2024-09-05

## 2024-09-05 RX ORDER — PROPOFOL 10 MG/ML
INJECTION, EMULSION INTRAVENOUS AS NEEDED
Status: DISCONTINUED | OUTPATIENT
Start: 2024-09-05 | End: 2024-09-05

## 2024-09-05 RX ADMIN — PROPOFOL 50 MG: 10 INJECTION, EMULSION INTRAVENOUS at 09:42

## 2024-09-05 RX ADMIN — SODIUM CHLORIDE, SODIUM LACTATE, POTASSIUM CHLORIDE, AND CALCIUM CHLORIDE: .6; .31; .03; .02 INJECTION, SOLUTION INTRAVENOUS at 09:38

## 2024-09-05 RX ADMIN — PROPOFOL 30 MG: 10 INJECTION, EMULSION INTRAVENOUS at 09:49

## 2024-09-05 RX ADMIN — PROPOFOL 300 MG: 10 INJECTION, EMULSION INTRAVENOUS at 09:55

## 2024-09-05 RX ADMIN — PROPOFOL 100 MG: 10 INJECTION, EMULSION INTRAVENOUS at 09:41

## 2024-09-05 RX ADMIN — PROPOFOL 30 MG: 10 INJECTION, EMULSION INTRAVENOUS at 09:44

## 2024-09-05 RX ADMIN — PROPOFOL 30 MG: 10 INJECTION, EMULSION INTRAVENOUS at 09:53

## 2024-09-05 NOTE — H&P
History and Physical -  [facility]  Neptali Elias 59 y.o. male MRN: [id number]                  HPI: Neptali Elias is a 59 y.o. year old male who presents for personal history of colon polyps      REVIEW OF SYSTEMS: Per the HPI, and otherwise unremarkable.    Historical Information   Past Medical History:   Diagnosis Date    Colon polyp     GERD (gastroesophageal reflux disease)     Hyperlipidemia     Hypertension     Sleep apnea      Past Surgical History:   Procedure Laterality Date    ACHILLES TENDON REPAIR Right     1983 in [place]    BACK SURGERY  2009    herniated L5-S1 disc repair    COLONOSCOPY       Social History   Social History     Substance and Sexual Activity   Alcohol Use Not Currently    Comment: used to drink 2-3x/week 1 bottle of wine     Social History     Substance and Sexual Activity   Drug Use Not Currently    Types: Marijuana    Comment: THC 3 times a week     Social History     Tobacco Use   Smoking Status Some Days    Types: Cigars, Cigarettes   Smokeless Tobacco Never   Tobacco Comments    1 cigar a day     Family History   Problem Relation Age of Onset    Hypertension Mother     Hyperlipidemia Mother     Prostate cancer Father     Hyperlipidemia Father     Stroke Father     Diabetes type II Brother     Hypertension Brother     Prostate cancer Brother        Meds/Allergies     Not in a hospital admission.    No Known Allergies    Objective     There were no vitals taken for this visit.      PHYSICAL EXAMINATION:    General Appearance:   Alert, cooperative, no distress   HEENT:  Normocephalic, atraumatic, anicteric. Neck supple, symmetrical, trachea midline.   Lungs:   Equal chest rise and unlabored breathing, normal effort, no coughing.   Cardiovascular:   No visualized JVD.   Abdomen:   No abdominal distension.   Skin:   No jaundice, rashes, or lesions.    Musculoskeletal:   Normal range of motion visualized.   Psych:  Normal affect and normal insight.    Neuro:  Alert and appropriate.           ASSESSMENT/PLAN:  This is a 59 y.o. year old male here for colonoscopy, and he is stable and optimized for his procedure.

## 2024-09-05 NOTE — ANESTHESIA POSTPROCEDURE EVALUATION
Post-Op Assessment Note    CV Status:  Stable  Pain Score: 0    Pain management: adequate       Mental Status:  Alert and awake   Hydration Status:  Euvolemic   PONV Controlled:  Controlled   Airway Patency:  Patent     Post Op Vitals Reviewed: Yes    No anethesia notable event occurred.    Staff: Anesthesiologist, CRNA           BP 99/60 (09/05/24 1000)    Temp (!) 97 °F (36.1 °C) (09/05/24 1000)    Pulse (!) 51 (09/05/24 1000)   Resp 15 (09/05/24 1000)    SpO2 100 % (09/05/24 1000)

## 2024-09-05 NOTE — TELEPHONE ENCOUNTER
Made several attempts to contact endoscopy department. Unable to get through, either no answer or call is disconnected after answered. Will try again.

## 2024-09-05 NOTE — ANESTHESIA PREPROCEDURE EVALUATION
Procedure:  COLONOSCOPY    Relevant Problems   ANESTHESIA (within normal limits)      CARDIO   (+) Essential hypertension   (+) Mixed hyperlipidemia   (-) Angina at rest   (-) Angina of effort   (-) Chest pain   (-) HARTMAN (dyspnea on exertion)      ENDO   (-) Diabetes mellitus type 1 (HCC)   (-) Diabetes mellitus, type 2 (HCC)      GI/HEPATIC   (+) Gastroesophageal reflux disease   (+) Sliding hiatal hernia   (-) Chronic liver disease      /RENAL   (-) Chronic kidney disease      MUSCULOSKELETAL   (+) Chronic midline low back pain   (+) Primary osteoarthritis of one hip, left   (+) Sliding hiatal hernia      NEURO/PSYCH   (+) Cerebrovascular accident (CVA) (HCC)   (+) Chronic midline low back pain   (-) Seizures (HCC)      PULMONARY   (+) LOUIE (obstructive sleep apnea)   (-) Asthma   (-) Chronic obstructive pulmonary disease (HCC)        Physical Exam    Airway    Mallampati score: II  TM Distance: >3 FB  Neck ROM: full     Dental   No notable dental hx     Cardiovascular      Pulmonary      Other Findings        Anesthesia Plan  ASA Score- 2     Anesthesia Type- IV sedation with anesthesia with ASA Monitors.         Additional Monitors:     Airway Plan:            Plan Factors-Exercise tolerance (METS): >4 METS.    Chart reviewed.                      Induction- intravenous.    Postoperative Plan-         Informed Consent- Anesthetic plan and risks discussed with patient.  I personally reviewed this patient with the CRNA. Discussed and agreed on the Anesthesia Plan with the CRNA..

## 2024-09-05 NOTE — TELEPHONE ENCOUNTER
Patient contacted office to find out what time his arrival time is for his colonoscopy today. Did inform patient that the endoscopy department did try reaching out to him twice yesterday and once this morning. Patients arrival time was 645 am. Patient states he did not receive any phone calls. Informed patient that when the calls were made a message was received that the call could not be completed. Patient is still wanting to come in for his colonoscopy today. Attempted to reach endoscopy department. Will try again and contact patient back.

## 2024-09-10 PROCEDURE — 88305 TISSUE EXAM BY PATHOLOGIST: CPT | Performed by: PATHOLOGY

## 2024-09-17 ENCOUNTER — TELEPHONE (OUTPATIENT)
Age: 59
End: 2024-09-17

## 2024-09-17 NOTE — TELEPHONE ENCOUNTER
Procedure: Colonoscopy  Date: 04/28/2024  Physician performing: Dr. Everett  Location of procedure:  [facility]  Instructions given to patient: [place]  Diabetic: No  Clearances: N/A

## 2024-09-17 NOTE — TELEPHONE ENCOUNTER
Pt states he was returning a call.  Stated he had a colonoscopy 15 days ago and thinks it is about that.  After review of chart.  Pt is not seen at [facility].    Gave him GI's phone number to call and to try his pcp at [facility] as no encounters are seen.

## 2024-09-17 NOTE — TELEPHONE ENCOUNTER
LOV 03/04/24    Pt calling for pathology results of colonoscopic tissue biopsies. Results and recommendations reviewed. Pt agreeable to 6 month recall. Attempted to warm transfer however was unable to hear . Provided scheduling number to pt.    Camryn Everett MD  9/10/2024 11:32 AM EDT Back to Top      Hi,     Can you please let him know we removed a precancerous polyp.  I would like to repeat the colonoscopy in 6 months     Thank you

## 2024-09-17 NOTE — TELEPHONE ENCOUNTER
----- Message from Camryn Everett MD sent at 9/10/2024 11:32 AM EDT -----  Hi,    Can you please let him know we removed a precancerous polyp.  I would like to repeat the colonoscopy in 6 months    Thank you

## 2024-09-20 ENCOUNTER — APPOINTMENT (OUTPATIENT)
Dept: LAB | Facility: HOSPITAL | Age: 59
End: 2024-09-20
Payer: COMMERCIAL

## 2024-09-20 DIAGNOSIS — F17.200 SMOKER: ICD-10-CM

## 2024-09-20 PROCEDURE — 80323 ALKALOIDS NOS: CPT

## 2024-09-26 ENCOUNTER — TELEPHONE (OUTPATIENT)
Dept: OBGYN CLINIC | Facility: MEDICAL CENTER | Age: 59
End: 2024-09-26

## 2024-09-26 NOTE — TELEPHONE ENCOUNTER
Patient was scheduled to be seen in the office today 9/26 for L ADDISON planning. However, we did not get resulted nicotine test yet. Offered to void visit for patient as we cannot complete surgery scheduling today. I will monitor for patients nicotine test result and will have the surgery schedulers call to select a date if result comes prior to appt next week. Otherwise we will discuss and plan L ADDISON next week on 10/3.

## 2024-09-27 LAB
COTININE SERPL-MCNC: 11.4 NG/ML
NICOTINE SERPL-MCNC: <1 NG/ML

## 2024-10-02 ENCOUNTER — TELEPHONE (OUTPATIENT)
Age: 59
End: 2024-10-02

## 2024-10-02 ENCOUNTER — TELEPHONE (OUTPATIENT)
Dept: OBGYN CLINIC | Facility: HOSPITAL | Age: 59
End: 2024-10-02

## 2024-10-02 NOTE — TELEPHONE ENCOUNTER
Murphy Austin, patient has a negative nicotine test so he is able to be scheduled for surgery. He has an appt tomorrow but would you please call to see if he would like to select a date to be put on the calendar today? If he would prefer to discuss tomorrow we can do that as well. Please let me know what he decides prior to appt tomorrow. Thanks!

## 2024-10-02 NOTE — TELEPHONE ENCOUNTER
Caller: Patient     Doctor: Kalen     Reason for call: Patient asked to be called to review his blood work     Call back#: [phone]

## 2024-10-02 NOTE — TELEPHONE ENCOUNTER
Caller: Neptali    Doctor: Kalen Rod    Reason for call: Patient has fu hip appointment tomorrow, 10-3 to review test results for surgery.    Patient called to ask if his lab results are in ?     Call back#: [phone]

## 2024-10-03 ENCOUNTER — TELEPHONE (OUTPATIENT)
Dept: OBGYN CLINIC | Facility: MEDICAL CENTER | Age: 59
End: 2024-10-03

## 2024-10-03 ENCOUNTER — OFFICE VISIT (OUTPATIENT)
Dept: OBGYN CLINIC | Facility: MEDICAL CENTER | Age: 59
End: 2024-10-03
Payer: COMMERCIAL

## 2024-10-03 VITALS
DIASTOLIC BLOOD PRESSURE: 79 MMHG | SYSTOLIC BLOOD PRESSURE: 150 MMHG | HEART RATE: 84 BPM | BODY MASS INDEX: 26.94 KG/M2 | HEIGHT: 70 IN | WEIGHT: 188.2 LBS

## 2024-10-03 DIAGNOSIS — Z01.818 PREOPERATIVE TESTING: ICD-10-CM

## 2024-10-03 DIAGNOSIS — M16.12 PRIMARY OSTEOARTHRITIS OF ONE HIP, LEFT: Primary | ICD-10-CM

## 2024-10-03 PROCEDURE — 99213 OFFICE O/P EST LOW 20 MIN: CPT | Performed by: ORTHOPAEDIC SURGERY

## 2024-10-03 NOTE — PROGRESS NOTES
Assessment & Plan     1. Primary osteoarthritis of one hip, left    2. Preoperative testing      Orders Placed This Encounter   Procedures    Comprehensive metabolic panel    Hemoglobin A1C W/EAG Estimation    CBC and differential    Anemia Panel w/Reflex    Protime-INR    APTT    Ambulatory referral to Physical Therapy    Ambulatory referral to Cardiology    Ambulatory referral to Internal Medicine    EKG 12 lead    Type and screen     Patient has severe left hip arthritis.  he has tried conservative treatment without adequate relief.  We discussed treatment options as well as risks and benefits of treatment options.  The patient would like to proceed with a left total hip arthroplasty.  The risks of surgery include, but are not limited to infection, blood clot, wound healing problems, blood loss, damage to blood vessels and nerves, persistent pain and stiffness, dislocation, fracture, leg-length discrepancy, need for additional surgery, need for revision surgery, failure of hardware, heart attack, stroke, death.  The patient understood and agreed to by oral and written consent.  I answered all questions regarding surgery.  Reviewed no driving for 4-6 weeks for right sided surgery once off narcotics.  No driving until off narcotics for left sided surgery.    Preoperative vitamins were prescribed.  Patient instructed to  what they are not already taking and start 30 days before surgery.  We let the patient know that some people experience constipation while on iron.  If that occurs can take iron every other day.  If still an issue can stop the iron.  Can also add in OTC medication and/or prune juice for constipation.    We also let the patient know that some people experience constipation after surgery while on narcotics.  We suggest to have OTC medications and/or prune juice available if needed.    DVT Prophylaxis: ASA  The patient will obtain clearance from: Soc, cardio and hematology   Patient had a stroke  earlier this year    Return for postop appt.    I answered all of the patient's questions during the visit and provided education of the patient's condition during the visit.  The patient verbalized understanding of the information given and agrees with the plan.  This note was dictated using [employer] software.  It may contain errors including improperly dictated words.  Please contact physician directly for any questions.    Subjective   Chief Complaint: No chief complaint on file.      HPI:  Neptali Elias is a 59 y.o. male who presents for follow up for severe left hip osteoarthritis.  Patient was last seen 7/23/2024 where we advised patient to continue to work on quitting smoking to become a surgical candidate and nicotine test was ordered.  Patient states he was able to obtain nicotine test on 9/20/2024 as he was able to quit smoking.  Patient states he continues to take Tylenol as needed for pain control and cannot take NSAIDs due to being on blood thinners.  Patient is here to discuss left ADDISON.    History of MRSA: no  History of blood clots: no, only had a stroke 1/13/24  Family history of blood clots: no  History of Hepatitis C:no  History of HIV: no  Are you a smoker: No, quit smoking  Are you diabetic:no  Do you see a cardiologist: yes  Have you seen a dentist in the past year: yes  Do you have a leg length discrepancy:  yes, left feel shorter      Review of Systems  See HPI for musculoskeletal review.   All other systems reviewed are negative     History:  Past Medical History:   Diagnosis Date    Colon polyp     GERD (gastroesophageal reflux disease)     Hyperlipidemia     Hypertension     Sleep apnea      Past Surgical History:   Procedure Laterality Date    ACHILLES TENDON REPAIR Right     1983 in [place]    BACK SURGERY  2009    herniated L5-S1 disc repair    COLONOSCOPY       Social History   Social History     Substance and Sexual Activity   Alcohol Use Not Currently    Comment: used to drink  2-3x/week 1 bottle of wine     Social History     Substance and Sexual Activity   Drug Use Not Currently    Types: Marijuana    Comment: THC 3 times a week     Social History     Tobacco Use   Smoking Status Some Days    Types: Cigars, Cigarettes   Smokeless Tobacco Never   Tobacco Comments    1 cigar a day     Family History:   Family History   Problem Relation Age of Onset    Hypertension Mother     Hyperlipidemia Mother     Prostate cancer Father     Hyperlipidemia Father     Stroke Father     Diabetes type II Brother     Hypertension Brother     Prostate cancer Brother        Current Outpatient Medications on File Prior to Visit   Medication Sig Dispense Refill    aspirin (ECOTRIN LOW STRENGTH) 81 mg EC tablet Take 81 mg by mouth daily      atorvastatin (LIPITOR) 40 mg tablet Take 1 tablet (40 mg total) by mouth daily 90 tablet 1    b complex vitamins capsule Take 1 capsule by mouth daily      Blood Pressure KIT Use 2 (two) times a day 1 kit 0    Blood Pressure Monitor KIT Use      Diclofenac Sodium (VOLTAREN) 1 % Apply 2 g topically 4 (four) times a day 150 g 0    escitalopram (LEXAPRO) 10 mg tablet Take 10 mg by mouth daily      esomeprazole (NexIUM) 20 mg capsule Take 1 capsule (20 mg total) by mouth daily in the early morning 30 capsule 1    fluticasone (FLONASE) 50 mcg/act nasal spray SPRAY 1 SPRAY INTO EACH NOSTRIL EVERY DAY (Patient not taking: Reported on 5/12/2024) 16 mL 0    hydrochlorothiazide (HYDRODIURIL) 25 mg tablet Take 1 tablet (25 mg total) by mouth daily 30 tablet 1    losartan (COZAAR) 25 mg tablet Take 1 tablet (25 mg total) by mouth daily for 180 doses 90 tablet 1    losartan (Cozaar) 50 mg tablet Take 1 tablet (50 mg total) by mouth daily 30 tablet 5    methylPREDNISolone 4 MG tablet therapy pack Use as directed on package 21 tablet 0    metoprolol tartrate (LOPRESSOR) 25 mg tablet Take 12.5 mg by mouth 2 (two) times a day      nicotine polacrilex (NICORETTE) 4 mg gum Chew 1 each (4 mg  "total) as needed for smoking cessation 100 each 0    omeprazole (PriLOSEC) 20 mg delayed release capsule Take 20 mg by mouth daily      prasugrel (EFFIENT) tablet Take 5 mg by mouth daily      semaglutide, 0.25 or 0.5 mg/dose, (Ozempic, 0.25 or 0.5 MG/DOSE,) 2 mg/3 mL injection pen Inject 0.75 mL (0.5 mg total) under the skin every 7 days 0.5 mL 0    tamsulosin (FLOMAX) 0.4 mg Take 0.4 mg by mouth      Wegovy 0.5 MG/0.5ML INJECT 1 MG UNDER THE SKIN EVERY 7 DAYS      Wegovy 0.5 MG/0.5ML INJECT 0.5MG UNDER THE SKIN EVERY 7 DAYS       Current Facility-Administered Medications on File Prior to Visit   Medication Dose Route Frequency Provider Last Rate Last Admin    ropivacaine (NAROPIN) injection 4 mL  4 mL Intra-articular Titrated    4 mL at 07/22/24 1446     No Known Allergies     Objective     /79   Pulse 84   Ht 5' 10\" (1.778 m)   Wt 85.4 kg (188 lb 3.2 oz)   BMI 27.00 kg/m²      PE:  AAOx 3  WDWN  Hearing intact, no drainage from eyes  no audible wheezing  no abdominal distension  LE compartments soft, skin intact    left hip:   No dislocation/deformity  Pos. Counts include [facility] beds [facility] the [facility]  ROM: 90 flexion, 35 ER, 10 IR pain in all directions  Pos. Florian Test  pos. Impingement test  No TTP over greater trochanter  Abduction: 5/5  Neg. Xena's test  mild TTP over SIJ     AT/GS intact      Imaging Studies: Results Review Statement: I personally reviewed the following image studies in PACS and associated radiology reports: Left hip XR. My interpretation of the radiology images/reports is: Severe left hip osteoarthritis with bone-on-bone contact..      Scribe Attestation      I,:  Navin Perea am acting as a scribe while in the presence of the attending physician.:       I,:  Rosalia Higuera DO personally performed the services described in this documentation    as scribed in my presence.:             "

## 2024-10-05 DIAGNOSIS — I10 ESSENTIAL HYPERTENSION: ICD-10-CM

## 2024-10-10 RX ORDER — AMLODIPINE BESYLATE 10 MG/1
10 TABLET ORAL DAILY
Qty: 90 TABLET | Refills: 1 | Status: SHIPPED | OUTPATIENT
Start: 2024-10-10

## 2024-10-16 RX ORDER — MULTIVITAMIN
1 TABLET ORAL DAILY
Qty: 30 TABLET | Refills: 1 | Status: SHIPPED | OUTPATIENT
Start: 2024-10-16

## 2024-10-16 RX ORDER — ASCORBIC ACID 500 MG
500 TABLET ORAL DAILY
Qty: 30 TABLET | Refills: 1 | Status: SHIPPED | OUTPATIENT
Start: 2024-10-16

## 2024-10-16 RX ORDER — CHLORHEXIDINE GLUCONATE 40 MG/ML
SOLUTION TOPICAL DAILY PRN
OUTPATIENT
Start: 2024-10-16

## 2024-10-16 RX ORDER — CHLORHEXIDINE GLUCONATE ORAL RINSE 1.2 MG/ML
15 SOLUTION DENTAL ONCE
OUTPATIENT
Start: 2024-10-16 | End: 2024-10-16

## 2024-10-16 RX ORDER — FERROUS SULFATE 324(65)MG
324 TABLET, DELAYED RELEASE (ENTERIC COATED) ORAL
Qty: 30 TABLET | Refills: 1 | Status: SHIPPED | OUTPATIENT
Start: 2024-10-16

## 2024-10-16 RX ORDER — FOLIC ACID 1 MG/1
1 TABLET ORAL DAILY
Qty: 30 TABLET | Refills: 1 | Status: SHIPPED | OUTPATIENT
Start: 2024-10-16

## 2024-10-16 RX ORDER — TRANEXAMIC ACID 10 MG/ML
1000 INJECTION, SOLUTION INTRAVENOUS ONCE
OUTPATIENT
Start: 2024-10-16 | End: 2024-10-16

## 2024-10-16 RX ORDER — SODIUM CHLORIDE 9 MG/ML
75 INJECTION, SOLUTION INTRAVENOUS CONTINUOUS
OUTPATIENT
Start: 2024-10-16

## 2024-10-16 RX ORDER — ACETAMINOPHEN 325 MG/1
975 TABLET ORAL ONCE
OUTPATIENT
Start: 2024-10-16 | End: 2024-10-16

## 2024-10-16 RX ORDER — GABAPENTIN 300 MG/1
300 CAPSULE ORAL ONCE
OUTPATIENT
Start: 2024-10-16 | End: 2024-10-16

## 2024-10-16 RX ORDER — CEFAZOLIN SODIUM 2 G/50ML
2000 SOLUTION INTRAVENOUS ONCE
OUTPATIENT
Start: 2024-10-16 | End: 2024-10-16

## 2024-10-22 ENCOUNTER — OFFICE VISIT (OUTPATIENT)
Dept: OBGYN CLINIC | Facility: MEDICAL CENTER | Age: 59
End: 2024-10-22
Payer: COMMERCIAL

## 2024-10-22 VITALS
HEIGHT: 70 IN | BODY MASS INDEX: 27.92 KG/M2 | WEIGHT: 195 LBS | SYSTOLIC BLOOD PRESSURE: 138 MMHG | DIASTOLIC BLOOD PRESSURE: 68 MMHG | HEART RATE: 68 BPM

## 2024-10-22 DIAGNOSIS — G89.29 CHRONIC PAIN OF LEFT KNEE: Primary | ICD-10-CM

## 2024-10-22 DIAGNOSIS — M25.562 CHRONIC PAIN OF LEFT KNEE: Primary | ICD-10-CM

## 2024-10-22 DIAGNOSIS — M17.12 PRIMARY OSTEOARTHRITIS OF LEFT KNEE: ICD-10-CM

## 2024-10-22 PROCEDURE — 99213 OFFICE O/P EST LOW 20 MIN: CPT | Performed by: EMERGENCY MEDICINE

## 2024-10-22 PROCEDURE — 20610 DRAIN/INJ JOINT/BURSA W/O US: CPT | Performed by: EMERGENCY MEDICINE

## 2024-10-22 RX ADMIN — TRIAMCINOLONE ACETONIDE 40 MG: 40 INJECTION, SUSPENSION INTRA-ARTICULAR; INTRAMUSCULAR at 14:15

## 2024-10-22 RX ADMIN — ROPIVACAINE HYDROCHLORIDE 4 ML: 2 INJECTION, SOLUTION EPIDURAL; INFILTRATION; PERINEURAL at 14:15

## 2024-10-22 NOTE — PROGRESS NOTES
Assessment/Plan:    Diagnoses and all orders for this visit:    Chronic pain of left knee  -     Large joint arthrocentesis: L knee    Primary osteoarthritis of left knee  -     Large joint arthrocentesis: L knee    Repeat CSI provided today  Discussed [facility] and provided pamphlet with information  Reviewed prior Xrays    Return in about 3 months (around 1/22/2025).      Subjective:   Patient ID: Neptali Elias is a 59 y.o. male.    Patient returns for chronic worsening left knee pain since last evaluation in July he received a CSI with good benefit up until recently.  Hoping for repeat injection        Review of Systems    The following portions of the patient's chart were reviewed and updated as appropriate:   Allergy:  No Known Allergies    Medications:    Current Outpatient Medications:     amLODIPine (NORVASC) 10 mg tablet, TAKE 1 TABLET BY MOUTH EVERY DAY, Disp: 90 tablet, Rfl: 1    ascorbic acid (VITAMIN C) 500 MG tablet, Take 1 tablet (500 mg total) by mouth daily Begin 30 days prior to surgery, Disp: 30 tablet, Rfl: 1    aspirin (ECOTRIN LOW STRENGTH) 81 mg EC tablet, Take 81 mg by mouth daily, Disp: , Rfl:     atorvastatin (LIPITOR) 40 mg tablet, Take 1 tablet (40 mg total) by mouth daily, Disp: 90 tablet, Rfl: 1    Blood Pressure KIT, Use 2 (two) times a day, Disp: 1 kit, Rfl: 0    Blood Pressure Monitor KIT, Use, Disp: , Rfl:     Diclofenac Sodium (VOLTAREN) 1 %, Apply 2 g topically 4 (four) times a day, Disp: 150 g, Rfl: 0    escitalopram (LEXAPRO) 10 mg tablet, Take 10 mg by mouth daily, Disp: , Rfl:     ferrous sulfate 324 (65 Fe) mg, Take 1 tablet (324 mg total) by mouth daily before breakfast Begin 30 days prior to surgery, Disp: 30 tablet, Rfl: 1    folic acid (KP Folic Acid) 1 mg tablet, Take 1 tablet (1 mg total) by mouth daily Begin 30 days prior to surgery, Disp: 30 tablet, Rfl: 1    hydrochlorothiazide (HYDRODIURIL) 25 mg tablet, Take 1 tablet (25 mg total) by mouth daily, Disp: 30  tablet, Rfl: 1    losartan (Cozaar) 50 mg tablet, Take 1 tablet (50 mg total) by mouth daily, Disp: 30 tablet, Rfl: 5    methylPREDNISolone 4 MG tablet therapy pack, Use as directed on package, Disp: 21 tablet, Rfl: 0    metoprolol tartrate (LOPRESSOR) 25 mg tablet, Take 12.5 mg by mouth 2 (two) times a day, Disp: , Rfl:     Multiple Vitamin (multivitamin) tablet, Take 1 tablet by mouth daily Begin 30 days prior to surgery, Disp: 30 tablet, Rfl: 1    nicotine polacrilex (NICORETTE) 4 mg gum, Chew 1 each (4 mg total) as needed for smoking cessation, Disp: 100 each, Rfl: 0    omeprazole (PriLOSEC) 20 mg delayed release capsule, Take 20 mg by mouth daily, Disp: , Rfl:     prasugrel (EFFIENT) tablet, Take 5 mg by mouth daily, Disp: , Rfl:     semaglutide, 0.25 or 0.5 mg/dose, (Ozempic, 0.25 or 0.5 MG/DOSE,) 2 mg/3 mL injection pen, Inject 0.75 mL (0.5 mg total) under the skin every 7 days, Disp: 0.5 mL, Rfl: 0    tamsulosin (FLOMAX) 0.4 mg, Take 0.4 mg by mouth, Disp: , Rfl:     Wegovy 0.5 MG/0.5ML, INJECT 1 MG UNDER THE SKIN EVERY 7 DAYS, Disp: , Rfl:     Wegovy 0.5 MG/0.5ML, INJECT 0.5MG UNDER THE SKIN EVERY 7 DAYS, Disp: , Rfl:     b complex vitamins capsule, Take 1 capsule by mouth daily, Disp: , Rfl:     esomeprazole (NexIUM) 20 mg capsule, Take 1 capsule (20 mg total) by mouth daily in the early morning, Disp: 30 capsule, Rfl: 1    fluticasone (FLONASE) 50 mcg/act nasal spray, SPRAY 1 SPRAY INTO EACH NOSTRIL EVERY DAY (Patient not taking: Reported on 5/12/2024), Disp: 16 mL, Rfl: 0    losartan (COZAAR) 25 mg tablet, Take 1 tablet (25 mg total) by mouth daily for 180 doses, Disp: 90 tablet, Rfl: 1    Current Facility-Administered Medications:     ropivacaine (NAROPIN) injection 4 mL, 4 mL, Intra-articular, Titrated, , 4 mL at 07/22/24 1446    Patient Active Problem List   Diagnosis    Essential hypertension    LOUIE (obstructive sleep apnea)    Chronic midline low back pain    Class 2 obesity in adult    Mixed  "hyperlipidemia    Gastroesophageal reflux disease    Difficulty using continuous positive airway pressure (CPAP) device    Primary osteoarthritis of one hip, left    Mood changes    Nocturia    Extremity numbness    Fatigue    Ulnar neuropathy of both upper extremities    Bilateral carpal tunnel syndrome    Swelling of right hand    Calcification of right carotid artery    Pain in left hip    Sliding hiatal hernia    External hemorrhoids    Diverticulosis    BMI 32.0-32.9,adult    Allergies    Encounter for support and coordination of transition of care    Cerebrovascular accident (CVA) (HCC)    Tobacco use disorder       Objective:  /68   Pulse 68   Ht 5' 10\" (1.778 m)   Wt 88.5 kg (195 lb)   BMI 27.98 kg/m²     Left Knee Exam     Other   Erythema: absent            Physical Exam      Neurologic Exam    Large joint arthrocentesis: L knee  Universal Protocol:  Consent: Verbal consent obtained.  Risks and benefits: risks, benefits and alternatives were discussed  Consent given by: patient  Time out: Immediately prior to procedure a \"time out\" was called to verify the correct patient, procedure, equipment, support staff and site/side marked as required.  Timeout called at: 10/22/2024 3:15 PM.  Patient understanding: patient states understanding of the procedure being performed  Test results: test results available and properly labeled  Site marked: the operative site was marked  Patient identity confirmed: verbally with patient  Supporting Documentation  Indications: pain   Procedure Details  Location: knee - L knee  Preparation: Patient was prepped and draped in the usual sterile fashion  Needle size: 22 G  Ultrasound guidance: no  Approach: anterolateral  Medications administered: 40 mg triamcinolone acetonide 40 mg/mL; 4 mL ropivacaine 0.2 %    Patient tolerance: patient tolerated the procedure well with no immediate complications  Dressing:  Sterile dressing applied    No erythema of knee(s)        I " have personally reviewed the written report of the pertinent studies.             Past Medical History:   Diagnosis Date    Colon polyp     GERD (gastroesophageal reflux disease)     Hyperlipidemia     Hypertension     Sleep apnea        Past Surgical History:   Procedure Laterality Date    ACHILLES TENDON REPAIR Right     1983 in [place]    BACK SURGERY  2009    herniated L5-S1 disc repair    COLONOSCOPY         Social History     Socioeconomic History    Marital status: Single     Spouse name: Not on file    Number of children: Not on file    Years of education: Not on file    Highest education level: Not on file   Occupational History    Not on file   Tobacco Use    Smoking status: Some Days     Types: Cigars, Cigarettes    Smokeless tobacco: Never    Tobacco comments:     1 cigar a day   Vaping Use    Vaping status: Never Used   Substance and Sexual Activity    Alcohol use: Not Currently     Comment: used to drink 2-3x/week 1 bottle of wine    Drug use: Not Currently     Types: Marijuana     Comment: THC 3 times a week    Sexual activity: Yes     Partners: Female     Comment: same for 25 years   Other Topics Concern    Not on file   Social History Narrative    2024    Lives in apartment    No pets     Lives with wife and father      Social Determinants of Health     Financial Resource Strain: Low Risk  (1/16/2024)    [facility]    Overall Financial Resource Strain (CARDIA)     Difficulty of Paying Living Expenses: Not hard at all   Food Insecurity: No Food Insecurity (1/16/2024)    [facility]    Hunger Vital Sign     Worried About Running Out of Food in the Last Year: Never true     Ran Out of Food in the Last Year: Never true   Transportation Needs: No Transportation Needs (1/16/2024)    [facility]    PRAPARE - Transportation      Lack of Transportation (Medical): No     Lack of Transportation (Non-Medical): No   Physical Activity: Not on file   Stress: Low Risk  (8/11/2021)    Received from [facility]    Stress     Stress Risk: Not on file     General Anxiety Disorder (UMU-7) Score: Not on file   Social Connections: Not on file   Intimate Partner Violence: Not At Risk (1/16/2024)    Received from [facility]    Humiliation, Afraid, Rape, and Kick questionnaire     Fear of Current or Ex-Partner: No     Emotionally Abused: No     Physically Abused: No     Sexually Abused: No   Housing Stability: Low Risk  (1/16/2024)    Received from [facility]    Housing Stability Vital Sign     Unable to Pay for Housing in the Last Year: No     Number of Places Lived in the Last Year: 1     Unstable Housing in the Last Year: No       Family History   Problem Relation Age of Onset    Hypertension Mother     Hyperlipidemia Mother     Prostate cancer Father     Hyperlipidemia Father     Stroke Father     Diabetes type II Brother     Hypertension Brother     Prostate cancer Brother

## 2024-10-23 RX ORDER — TRIAMCINOLONE ACETONIDE 40 MG/ML
40 INJECTION, SUSPENSION INTRA-ARTICULAR; INTRAMUSCULAR
Status: COMPLETED | OUTPATIENT
Start: 2024-10-22 | End: 2024-10-22

## 2024-10-23 RX ORDER — ROPIVACAINE HYDROCHLORIDE 2 MG/ML
4 INJECTION, SOLUTION EPIDURAL; INFILTRATION; PERINEURAL
Status: COMPLETED | OUTPATIENT
Start: 2024-10-22 | End: 2024-10-22

## 2024-11-12 ENCOUNTER — TELEPHONE (OUTPATIENT)
Dept: OBGYN CLINIC | Facility: MEDICAL CENTER | Age: 59
End: 2024-11-12

## 2024-11-12 NOTE — TELEPHONE ENCOUNTER
I tried to call the patient but no answer. I was unable to leave a VM for him to give me a call back. Patient needs to schedule a pre op visit one week prior to surgery per Dr. Higuera.

## 2024-11-13 ENCOUNTER — TELEPHONE (OUTPATIENT)
Age: 59
End: 2024-11-13

## 2024-11-13 DIAGNOSIS — G89.29 CHRONIC PAIN OF LEFT KNEE: Primary | ICD-10-CM

## 2024-11-13 DIAGNOSIS — M17.12 PRIMARY OSTEOARTHRITIS OF LEFT KNEE: ICD-10-CM

## 2024-11-13 DIAGNOSIS — M25.562 CHRONIC PAIN OF LEFT KNEE: Primary | ICD-10-CM

## 2024-11-13 NOTE — TELEPHONE ENCOUNTER
Caller: Patient    Doctor: Dr. Pham    Reason for call: Patient calling stating he had an CSI on 10/22/24 and it hasn't seemed to help.  Patient stating that Dr. Pham mentioned Durolane injection and patient would like to proceed with the Durolane injections.   Patient asking when eligible to get the injection?     Call back#: [phone]

## 2024-11-18 DIAGNOSIS — M16.12 PRIMARY OSTEOARTHRITIS OF ONE HIP, LEFT: ICD-10-CM

## 2024-11-19 ENCOUNTER — TELEPHONE (OUTPATIENT)
Age: 59
End: 2024-11-19

## 2024-11-19 RX ORDER — ASCORBIC ACID 500 MG
TABLET ORAL
Qty: 30 TABLET | Refills: 1 | Status: SHIPPED | OUTPATIENT
Start: 2024-11-19

## 2024-11-19 NOTE — TELEPHONE ENCOUNTER
Patient was calling to schedule his knee injections. I advised the patient that he will receive a call from the office to schedule. No further questions.

## 2024-12-11 ENCOUNTER — PROCEDURE VISIT (OUTPATIENT)
Dept: OBGYN CLINIC | Facility: MEDICAL CENTER | Age: 59
End: 2024-12-11
Payer: COMMERCIAL

## 2024-12-11 VITALS
HEART RATE: 72 BPM | BODY MASS INDEX: 28.2 KG/M2 | WEIGHT: 197 LBS | SYSTOLIC BLOOD PRESSURE: 154 MMHG | HEIGHT: 70 IN | DIASTOLIC BLOOD PRESSURE: 82 MMHG

## 2024-12-11 DIAGNOSIS — M25.562 CHRONIC PAIN OF LEFT KNEE: Primary | ICD-10-CM

## 2024-12-11 DIAGNOSIS — M17.12 PRIMARY OSTEOARTHRITIS OF LEFT KNEE: ICD-10-CM

## 2024-12-11 DIAGNOSIS — G89.29 CHRONIC PAIN OF LEFT KNEE: Primary | ICD-10-CM

## 2024-12-11 PROCEDURE — 20610 DRAIN/INJ JOINT/BURSA W/O US: CPT | Performed by: EMERGENCY MEDICINE

## 2024-12-11 NOTE — PROGRESS NOTES
Assessment/Plan:    Diagnoses and all orders for this visit:    Chronic pain of left knee  -     Large joint arthrocentesis: L knee    Primary osteoarthritis of left knee  -     Large joint arthrocentesis: L knee    Do not bill specialty pharmacy  Shamir left knee provided today  Repeat CSI performed 10/22/2024    Return if symptoms worsen or fail to improve.      Subjective:   Patient ID: Neptali Elias is a 59 y.o. male.    HPI  Left knee pain 7/10    Review of Systems    The following portions of the patient's chart were reviewed and updated as appropriate:   Allergy:  No Known Allergies    Medications:    Current Outpatient Medications:     amLODIPine (NORVASC) 10 mg tablet, TAKE 1 TABLET BY MOUTH EVERY DAY, Disp: 90 tablet, Rfl: 1    ascorbic acid (VITAMIN C) 500 mg tablet, TAKE 1 TABLET (500 MG TOTAL) BY MOUTH DAILY BEGIN 30 DAYS PRIOR TO SURGERY, Disp: 30 tablet, Rfl: 1    aspirin (ECOTRIN LOW STRENGTH) 81 mg EC tablet, Take 81 mg by mouth daily, Disp: , Rfl:     atorvastatin (LIPITOR) 40 mg tablet, Take 1 tablet (40 mg total) by mouth daily, Disp: 90 tablet, Rfl: 1    Blood Pressure KIT, Use 2 (two) times a day, Disp: 1 kit, Rfl: 0    Blood Pressure Monitor KIT, Use, Disp: , Rfl:     Diclofenac Sodium (VOLTAREN) 1 %, Apply 2 g topically 4 (four) times a day, Disp: 150 g, Rfl: 0    escitalopram (LEXAPRO) 10 mg tablet, Take 10 mg by mouth daily, Disp: , Rfl:     ferrous sulfate 324 (65 Fe) mg, Take 1 tablet (324 mg total) by mouth daily before breakfast Begin 30 days prior to surgery, Disp: 30 tablet, Rfl: 1    folic acid (KP Folic Acid) 1 mg tablet, Take 1 tablet (1 mg total) by mouth daily Begin 30 days prior to surgery, Disp: 30 tablet, Rfl: 1    hydrochlorothiazide (HYDRODIURIL) 25 mg tablet, Take 1 tablet (25 mg total) by mouth daily, Disp: 30 tablet, Rfl: 1    losartan (Cozaar) 50 mg tablet, Take 1 tablet (50 mg total) by mouth daily, Disp: 30 tablet, Rfl: 5    methylPREDNISolone 4 MG  tablet therapy pack, Use as directed on package, Disp: 21 tablet, Rfl: 0    metoprolol tartrate (LOPRESSOR) 25 mg tablet, Take 12.5 mg by mouth 2 (two) times a day, Disp: , Rfl:     Multiple Vitamin (multivitamin) tablet, Take 1 tablet by mouth daily Begin 30 days prior to surgery, Disp: 30 tablet, Rfl: 1    nicotine polacrilex (NICORETTE) 4 mg gum, Chew 1 each (4 mg total) as needed for smoking cessation, Disp: 100 each, Rfl: 0    omeprazole (PriLOSEC) 20 mg delayed release capsule, Take 20 mg by mouth daily, Disp: , Rfl:     prasugrel (EFFIENT) tablet, Take 5 mg by mouth daily, Disp: , Rfl:     semaglutide, 0.25 or 0.5 mg/dose, (Ozempic, 0.25 or 0.5 MG/DOSE,) 2 mg/3 mL injection pen, Inject 0.75 mL (0.5 mg total) under the skin every 7 days, Disp: 0.5 mL, Rfl: 0    tamsulosin (FLOMAX) 0.4 mg, Take 0.4 mg by mouth, Disp: , Rfl:     Wegovy 0.5 MG/0.5ML, INJECT 1 MG UNDER THE SKIN EVERY 7 DAYS, Disp: , Rfl:     Wegovy 0.5 MG/0.5ML, INJECT 0.5MG UNDER THE SKIN EVERY 7 DAYS, Disp: , Rfl:     b complex vitamins capsule, Take 1 capsule by mouth daily, Disp: , Rfl:     esomeprazole (NexIUM) 20 mg capsule, Take 1 capsule (20 mg total) by mouth daily in the early morning, Disp: 30 capsule, Rfl: 1    fluticasone (FLONASE) 50 mcg/act nasal spray, SPRAY 1 SPRAY INTO EACH NOSTRIL EVERY DAY (Patient not taking: Reported on 12/11/2024), Disp: 16 mL, Rfl: 0    losartan (COZAAR) 25 mg tablet, Take 1 tablet (25 mg total) by mouth daily for 180 doses, Disp: 90 tablet, Rfl: 1    Current Facility-Administered Medications:     ropivacaine (NAROPIN) injection 4 mL, 4 mL, Intra-articular, Titrated, , 4 mL at 07/22/24 1446    Patient Active Problem List   Diagnosis    Essential hypertension    LOUIE (obstructive sleep apnea)    Chronic midline low back pain    Class 2 obesity in adult    Mixed hyperlipidemia    Gastroesophageal reflux disease    Difficulty using continuous positive airway pressure (CPAP) device    Primary osteoarthritis of  "one hip, left    Mood changes    Nocturia    Extremity numbness    Fatigue    Ulnar neuropathy of both upper extremities    Bilateral carpal tunnel syndrome    Swelling of right hand    Calcification of right carotid artery    Pain in left hip    Sliding hiatal hernia    External hemorrhoids    Diverticulosis    BMI 32.0-32.9,adult    Allergies    Encounter for support and coordination of transition of care    Cerebrovascular accident (CVA) ([facility])    Tobacco use disorder       Objective:  /82   Pulse 72   Ht 5' 10\" (1.778 m)   Wt 89.4 kg (197 lb)   BMI 28.27 kg/m²     Ortho Exam    Physical Exam      Neurologic Exam    Large joint arthrocentesis: L knee  Universal Protocol:  Consent: Verbal consent obtained.  Risks and benefits: risks, benefits and alternatives were discussed  Consent given by: patient  Time out: Immediately prior to procedure a \"time out\" was called to verify the correct patient, procedure, equipment, support staff and site/side marked as required.  Timeout called at: 12/11/2024 1:54 PM.  Patient understanding: patient states understanding of the procedure being performed  Test results: test results available and properly labeled  Site marked: the operative site was marked  Patient identity confirmed: verbally with patient  Supporting Documentation  Indications: pain   Procedure Details  Location: knee - L knee  Preparation: Patient was prepped and draped in the usual sterile fashion  Needle size: 20 G  Ultrasound guidance: no  Approach: anterolateral  Medications administered: 3 mL sodium hyaluronate 60 MG/3ML  Specialty Pharmacy Supplied: received medications from pharmacy  Patient tolerance: patient tolerated the procedure well with no immediate complications  Dressing:  Sterile dressing applied    No erythema of knees          I have personally reviewed the written report of the pertinent studies.             Past Medical History:   Diagnosis Date    Colon polyp     GERD " (gastroesophageal reflux disease)     Hyperlipidemia     Hypertension     Sleep apnea        Past Surgical History:   Procedure Laterality Date    ACHILLES TENDON REPAIR Right     1983 in [place]    BACK SURGERY  2009    herniated L5-S1 disc repair    COLONOSCOPY         Social History     Socioeconomic History    Marital status: Single     Spouse name: Not on file    Number of children: Not on file    Years of education: Not on file    Highest education level: Not on file   Occupational History    Not on file   Tobacco Use    Smoking status: Some Days     Types: Cigars, Cigarettes    Smokeless tobacco: Never    Tobacco comments:     1 cigar a day   Vaping Use    Vaping status: Never Used   Substance and Sexual Activity    Alcohol use: Not Currently     Comment: used to drink 2-3x/week 1 bottle of wine    Drug use: Not Currently     Types: Marijuana     Comment: THC 3 times a week    Sexual activity: Yes     Partners: Female     Comment: same for 25 years   Other Topics Concern    Not on file   Social History Narrative    2024    Lives in apartment    No pets     Lives with wife and father      Social Drivers of Health     Financial Resource Strain: Low Risk  (1/16/2024)    [facility]    Overall Financial Resource Strain (CARDIA)     Difficulty of Paying Living Expenses: Not hard at all   Food Insecurity: No Food Insecurity (1/16/2024)    [facility]    Hunger Vital Sign     Worried About Running Out of Food in the Last Year: Never true     Ran Out of Food in the Last Year: Never true   Transportation Needs: No Transportation Needs (1/16/2024)    [facility]    PRAPARE - Transportation     Lack of Transportation (Medical): No     Lack of Transportation (Non-Medical): No   Physical Activity: Not on file   Stress: Low Risk  (8/11/2021)     Received from [facility]    Stress     Stress Risk: Not on file     General Anxiety Disorder (UMU-7) Score: Not on file   Social Connections: Not on file   Intimate Partner Violence: Not At Risk (1/16/2024)    [facility]    Humiliation, Afraid, Rape, and Kick questionnaire     Fear of Current or Ex-Partner: No     Emotionally Abused: No     Physically Abused: No     Sexually Abused: No   Housing Stability: Low Risk  (1/16/2024)    [facility]    Housing Stability Vital Sign     Unable to Pay for Housing in the Last Year: No     Number of Places Lived in the Last Year: 1     Unstable Housing in the Last Year: No       Family History   Problem Relation Age of Onset    Hypertension Mother     Hyperlipidemia Mother     Prostate cancer Father     Hyperlipidemia Father     Stroke Father     Diabetes type II Brother     Hypertension Brother     Prostate cancer Brother

## 2024-12-27 ENCOUNTER — TELEPHONE (OUTPATIENT)
Dept: OBGYN CLINIC | Facility: HOSPITAL | Age: 59
End: 2024-12-27

## 2024-12-27 DIAGNOSIS — M17.12 PRIMARY OSTEOARTHRITIS OF LEFT KNEE: Primary | ICD-10-CM

## 2024-12-27 DIAGNOSIS — M16.12 PRIMARY OSTEOARTHRITIS OF ONE HIP, LEFT: Primary | ICD-10-CM

## 2024-12-27 DIAGNOSIS — M16.12 PRIMARY OSTEOARTHRITIS OF ONE HIP, LEFT: ICD-10-CM

## 2024-12-27 DIAGNOSIS — Z01.818 PRE-OP TESTING: ICD-10-CM

## 2024-12-27 LAB
DME PARACHUTE DELIVERY DATE REQUESTED: NORMAL
DME PARACHUTE DELIVERY NOTE: NORMAL
DME PARACHUTE ITEM DESCRIPTION: NORMAL
DME PARACHUTE ORDER STATUS: NORMAL
DME PARACHUTE SUPPLIER NAME: NORMAL
DME PARACHUTE SUPPLIER PHONE: NORMAL

## 2024-12-27 RX ORDER — MUPIROCIN 20 MG/G
OINTMENT TOPICAL
Qty: 15 G | Refills: 0 | Status: SHIPPED | OUTPATIENT
Start: 2024-12-27

## 2024-12-27 NOTE — TELEPHONE ENCOUNTER
Perla, ignore the last message. That was stated in error.    I have placed the order for MRSA and Bactroban. Naima

## 2024-12-27 NOTE — TELEPHONE ENCOUNTER
Please call the patient and instruct that unfortunately we will not be able to see him in the office on Monday because  will be in the OR operating all day. Please instruct that we would like him to be placing powder to this area frequently to keep the area dry. Instruct that we would also like him to give us a call on Monday 12/30/24 will an update about his skin.

## 2024-12-27 NOTE — TELEPHONE ENCOUNTER
Preoperative Elective Admission Assessment: spoke to patient.     EKG/LAB/MRSA SWAB/CXR date: 1/15    Living Situation:    Who does pt live with: lives alone  What kind of home: apartment  How do they enter the home: front  How many levels in home: 1 level apartment.    # of steps to enter home: 3-4 to enter, with railing.   # of steps to second floor: N/.A  Are there handrails: Yes  Are there landings: No  Sleeping arrangement: first/entry floor  Where is Bathroom: First floor bath, walk in shower with grab bars      First Floor Setup:   Is there a bathroom: Yes  Where would pt sleep: bed     DME: cane -- ordering RW.     We discussed clearing pathways in the home and making sure there is accessibly to use the walker, for example, removing throw rugs.      Patient's Current Level of Function: Ambulates: Independently and ADLs: Independent    Post-op Caregiver:   Currently receive any HHC/aides/community supports: NO     Post-op Transport:   Uses community transport now: No     Outpatient Physical Therapy Site:  Site: [place]   pre and post-op appts scheduled? No- sent to [facility]      Medication Management: self  Preferred Pharmacy for Post-op Medications: [id number] - [place] - [address] [place] [address] [[id number]]   Blood Management Vitamin Regimen:  Pt has at home to start 30 days before surgery   Post-op anticoagulant: to be determined by surgical team postoperatively  Has Bactroban for 5 days preop: No- will sent to surgeon.   Educated on Preoperative Bathing Instructions, and use of Soap for 5 days before surgery.      DC Plan:     Barriers to DC identified preoperatively: caregiver support and transportation    BMI: 28.27    Patient Education:  Pt educated on post-op pain, early mobilization (POD0), LOS goals, OP PT goals, and preoperative bathing. Patient educated that our goal is to appropriately discharge patient based off their post-op function while striving to maintain maximal independence. The goal  is to discharge patient to home and for them to attend outpatient physical therapy.    Assigned to care team? Yes    SW referral: YES

## 2025-01-02 ENCOUNTER — PATIENT OUTREACH (OUTPATIENT)
Dept: OBGYN CLINIC | Facility: HOSPITAL | Age: 60
End: 2025-01-02

## 2025-01-02 NOTE — PROGRESS NOTES
[facility] [place] new [place] in regard to pt scheduled for arthroplasty of left hip on 02/04/2025. [facility] [place] [facility] notes prior to contacting pt. Pt lives alone in an apartment. Pt ambulates independently and is independent with ADLs. Pt has no caregiver support plan and no transportation plan.   [facility] attempted to reach pt and was unable. A message was left to please return [facility] call. [facility] will remain available.

## 2025-01-07 LAB
DME PARACHUTE DELIVERY DATE ACTUAL: NORMAL
DME PARACHUTE DELIVERY DATE EXPECTED: NORMAL
DME PARACHUTE DELIVERY DATE REQUESTED: NORMAL
DME PARACHUTE DELIVERY NOTE: NORMAL
DME PARACHUTE ITEM DESCRIPTION: NORMAL
DME PARACHUTE ORDER STATUS: NORMAL
DME PARACHUTE SUPPLIER NAME: NORMAL
DME PARACHUTE SUPPLIER PHONE: NORMAL

## 2025-01-08 ENCOUNTER — PATIENT OUTREACH (OUTPATIENT)
Dept: OBGYN CLINIC | Facility: HOSPITAL | Age: 60
End: 2025-01-08

## 2025-01-08 NOTE — PROGRESS NOTES
SWCM made second attempt to reach pt in regard to caregiver support and transportation. I was able to reach pt and introduced self and role. SWCM and pt first discussed caregiver support, today per pt he has no family, friends, neighbors to provide support. Per pt he is from [place] and [place] and not from this area. Per review of chart, it appears pt has been in this area since 2020. Pt and SWCM discussed self pay HHC support and per pt he is unable to afford same. Pt states he is on SSD and has limited income. Pt is requesting STR and SWCM educated DC to STR is based on medical criteria and determined after surgery. We are unable to guarantee same. Pt states if he does DC to home he will have no support.  SWCM and pt discussed transportation. Pt has no ride to and from surgery. Pt again states he has nobody to provide rides for him. Pt does drive at baseline and has a vehicle.  Drive can provide lyft for pt to surgery, however pt needs an adult to accompany him home after surgery or a non emergency ambulance ride will need to be scheduled. This will be at a cost to pt. Pt made aware and encouraged speak to any friends, neighbors etc, SWCM and pt then discussed [employer] as an option for tp to use to get to and from OP PT. Pt states he has their number and will to request an application.   At this time, pt is denying having any caregiver support options and unable to afford any self pay support. Pt also has no transportation to and from surgery. Pt will apply for [employer] to get to and from OP PT. SWCM will message NN in regard to same and continue to support pt.

## 2025-01-13 NOTE — PROGRESS NOTES
Internal Medicine Pre-Operative Evaluation:     Reason for Visit: Pre-operative Evaluation for Risk Stratification and Optimization    Patient ID: Neptali Elias is a 59 y.o. male.     Case: [id number] Date/Time: 02/04/25 0745   Procedure: ARTHROPLASTY HIP TOTAL ANTERIOR,NAVIGATED, potential same day discharge (Left: Hip)   Anesthesia type: Spinal   Diagnosis: Primary osteoarthritis of one hip, left [M16.12]   Pre-op diagnosis: Primary osteoarthritis of one hip, left [M16.12]   Location:  OR ROOM 12 / [facility]   Surgeons: Rosalia Higuera,          Recommendations to Proceed withSurgery    Patient is considered to be Medium risk for Medium risk procedure.     After evaluation and discussion with patient with emphasis that all surgery has some degree of inherent risk it is acknowledged by patient this risk is Acceptable.    Patient is optimized and may proceed with planned procedure.     Assessment    Pre-operative Medical Evaluation for planned surgery  Recommendations as listed in PLAN section below  Contact surgical nurse  navigator with any questions regarding preoperative plan or schedule.      Assessment & Plan  Primary osteoarthritis of one hip, left  Failed outpatient conservative measures  Elected to undergo total joint arthroplasty    LOUIE (obstructive sleep apnea)  Cont CPAP  Mixed hyperlipidemia  Continue low cholesterol diet and statin therapy    Gastroesophageal reflux disease, unspecified whether esophagitis present  Continue PPI    Cerebrovascular accident (CVA), unspecified mechanism (HCC)  Hx R CVA tx with TPA and carotid stenting  Recommend cont ASA w/o interruption for surgery  Follows with neurology  Pre-op examination  Use of GLP-1- recommend DC this 1 week prior to sx  Benign essential HTN  Continue metoprolol  Hold ACEI/ARB/diuretics to decrease risk of post operative MARGARITO; post op add hydralazine prn for elevated blood pressures. May resume  these meds upon discharge    Preoperative testing             Plan:     1. Further preoperative workup as follows:   - none no further testing required may proceed with surgery    2. Preoperative Medication Management Review performed by PAT nursing  YES    3. Patient requires further consultation with:   No Consults Required    4. Discharge Planning / Barriers to Discharge  none identified - patients has post discharge therapy plan in place, transportation arranged for discharge day, adequate family support at home to assist with discharge to home.        Subjective:           History of Present Illness:     Neptali Elias is a 59 y.o. male who presents to the office today for a preoperative consultation at the request of surgeon. The patient understands this is an elective procedure and not emergent. They are electing to undergo planned procedure with an understanding that all surgery has inherent risk. They have worked with their surgeon and failed conservative treatment measures. Today they present for preoperative risk assessment and recommendations for optimization in preparation for surgery.        Pt seen in surgical optimization center for upcoming proposed surgery. They have failed previous conservative measures and have elected surgical intervention.     Pt meets presurgical lab and BMI optimization goals.      Neptali Elias has an IN HOSPITAL cardiac risk of RCI RISK CLASS II (1 risk factor, risk of major cardiac compl. appr. 1.3%) based on RCRI calculator    Cardiac Risk Estimation: per the Revised Cardiac Risk Index (Circ. 100:1043, 1999),         Pre-op Exam    Previous history of bleeding disorders or clots?: Yes  Previous Anesthesia reaction?: No  Prolonged steroid use in the last 6 months?: No    Assessment of Cardiac Risk:   - Unstable or severe angina or MI in the last 6 weeks or history of stent placement in the last year?: No   - Decompensated heart failure (e.g. New onset  "heart failure, NYHA  Class IV heart failure, or worsening existing heart failure)?: No  - Significant arrhythmias such as high grade AV block, symptomatic ventricular arrhythmia, newly recognized ventricular tachycardia, supraventricular tachycardia with resting heart rate >100, or symptomatic bradycardia?: No  - Severe heart valve disease including aortic stenosis or symptomatic mitral stenosis?: No      Pre-operative Risk Factors:  Elevated-risk surgery: No    History of cerebrovascular disease: Yes  History of ischemic heart disease: No  Pre-operative treatment with insulin: No  Pre-operative creatinine >2 mg/dL: No    History of congestive heart failure: No    Duke Activity Status Index (DASI):   DASI Total Score: 28.7  METs: 6.3    Medications of Perioperative Concern:   Anti-platelet (aspirin, clopidogrel, ticagrelor, prasugrel, cilostazol, dipyridamole) and ACE inhibitors/ARBs        ROS: No TIA's or unusual headaches, no dysphagia.  No prolonged cough. No dyspnea or chest pain on exertion.  No abdominal pain, change in bowel habits, black or bloody stools.  No urinary tract or BPH symptoms.  Positive reported pain in arthritic joint. Positive difficulty with gait. No skin rashes or issues.      Objective:    /82   Pulse (!) 53   Ht 5' 10\" (1.778 m)   Wt 94.3 kg (208 lb)   BMI 29.84 kg/m²       General Appearance: no distress, conversive  HEENT: PERRLA, conjuctiva normal; oropharynx clear; mucous membranes moist;   Neck:  Supple, no lymphadenopathy or thyromegaly  Lungs: breath sounds normal, normal respiratory effort, no retractions, expiratory effort normal  CV: normal heart sounds S1/S2, PMI normal   ABD: soft non tender, no masses , no hepatic or splenomegaly  EXT: DP pulses intact, no lymphadenopathy, no edema  Skin: normal turgor, normal texture, no rash  Psych: affect normal, mood normal  Neuro: AAOx3        The following portions of the patient's history were reviewed and updated as " appropriate: allergies, current medications, past family history, past medical history, past social history, past surgical history and problem list.     Past History:       Past Medical History:   Diagnosis Date    Colon polyp     GERD (gastroesophageal reflux disease)     Hyperlipidemia     Hypertension     Sleep apnea     Past Surgical History:   Procedure Laterality Date    ACHILLES TENDON REPAIR Right     1983 in [place]    BACK SURGERY  2009    herniated L5-S1 disc repair    COLONOSCOPY            Social History     Tobacco Use    Smoking status: Some Days     Types: Cigars, Cigarettes    Smokeless tobacco: Never    Tobacco comments:     1 cigar a day   Vaping Use    Vaping status: Never Used   Substance Use Topics    Alcohol use: Not Currently     Comment: used to drink 2-3x/week 1 bottle of wine    Drug use: Not Currently     Types: Marijuana     Comment: THC 3 times a week     Family History   Problem Relation Age of Onset    Hypertension Mother     Hyperlipidemia Mother     Prostate cancer Father     Hyperlipidemia Father     Stroke Father     Diabetes type II Brother     Hypertension Brother     Prostate cancer Brother           Allergies:     No Known Allergies     Current Medications:     Current Outpatient Medications   Medication Instructions    amLODIPine (NORVASC) 10 mg, Oral, Daily    ascorbic acid (VITAMIN C) 500 mg tablet TAKE 1 TABLET (500 MG TOTAL) BY MOUTH DAILY BEGIN 30 DAYS PRIOR TO SURGERY    atorvastatin (LIPITOR) 80 mg, Daily    b complex vitamins capsule 1 capsule, Daily    Blood Pressure KIT Does not apply, 2 times daily    Diclofenac Sodium (VOLTAREN) 2 g, Topical, 4 times daily    ferrous sulfate 324 mg, Oral, Daily before breakfast, Begin 30 days prior to surgery    folic acid ( FOLIC ACID) 1 mg, Oral, Daily, Begin 30 days prior to surgery    losartan (COZAAR) 50 mg, Oral, Daily    metoprolol tartrate (LOPRESSOR) 12.5 mg, 2 times daily    Multiple Vitamin (multivitamin) tablet 1  "tablet, Oral, Daily, Begin 30 days prior to surgery    mupirocin (BACTROBAN) 2 % ointment Apply to the inside of the Left and Right Nostril twice a day for 5 days before surgery, including the morning of surgery    omeprazole (PRILOSEC) 20 mg, Daily    tamsulosin (FLOMAX) 0.4 mg    Wegovy 1 mg, Weekly           PRE-OP WORKSHEET DATA    Assessment of Pre-Operative Risks     MLJ Quality Hard Stops:    BMI (<40) : Estimated body mass index is 29.84 kg/m² as calculated from the following:    Height as of this encounter: 5' 10\" (1.778 m).    Weight as of this encounter: 94.3 kg (208 lb).    Hgb ( >11):   Lab Results   Component Value Date    HGB 15.1 01/17/2025    HGB 16.5 01/12/2024    HGB 15.2 06/28/2023       HbA1c (<7.5) :   Lab Results   Component Value Date    HGBA1C 5.5 01/17/2025       GFR (>60) (Less then 45 = Nephrology consult):    Lab Results   Component Value Date    EGFR 92 01/17/2025    EGFR 104 12/19/2024    EGFR 107 09/23/2024            Pre-Op Data Reviewed:       Laboratory Results: I have personally reviewed the pertinent reports    EKG: I personally reviewed and interpreted available tracings in the electronic medical record    Encounter Date: 01/17/25   ECG 12 lead   Result Value    Ventricular Rate 60    Atrial Rate 60    SC Interval 164    QRSD Interval 92    QT Interval 410    QTC Interval 410    P Axis 73    QRS Axis -28    T Wave Axis 181    Narrative    Normal sinus rhythm  Moderate voltage criteria for LVH, may be normal variant  ST & T wave abnormality, consider lateral ischemia  Abnormal ECG  When compared with ECG of 28-Jun-2023 04:12,  Significant changes have occurred  Confirmed by Umesh Crowder ([id number]) on 1/21/2025 6:00:01 AM       OLD RECORDS: reviewed old records in the chart review section if EHR on day of visit.    Previous cardiopulmonary studies within the past year:  Echocardiogram: no   Cardiac Catheterization: no  Stress Test: no      Time of visit including pre-visit chart " review, visit and post-visit coordination of plan and care , review of pre-surgical lab work, preparation and time spent documenting note in electronic medical record, time spent face-to-face in physical examination answering patient questions by care team 35 minutes             [facility]

## 2025-01-13 NOTE — ASSESSMENT & PLAN NOTE
Hx R CVA tx with TPA and carotid stenting  Recommend cont ASA w/o interruption for surgery  Follows with neurology

## 2025-01-13 NOTE — H&P (VIEW-ONLY)
Internal Medicine Pre-Operative Evaluation:     Reason for Visit: Pre-operative Evaluation for Risk Stratification and Optimization    Patient ID: Neptali Elias is a 59 y.o. male.     Case: [id number] Date/Time: 02/04/25 0745   Procedure: ARTHROPLASTY HIP TOTAL ANTERIOR,NAVIGATED, potential same day discharge (Left: Hip)   Anesthesia type: Spinal   Diagnosis: Primary osteoarthritis of one hip, left [M16.12]   Pre-op diagnosis: Primary osteoarthritis of one hip, left [M16.12]   Location:  OR ROOM 12 / [facility]   Surgeons: Rosalia Higuera,          Recommendations to Proceed withSurgery    Patient is considered to be Medium risk for Medium risk procedure.     After evaluation and discussion with patient with emphasis that all surgery has some degree of inherent risk it is acknowledged by patient this risk is Acceptable.    Patient is optimized and may proceed with planned procedure.     Assessment    Pre-operative Medical Evaluation for planned surgery  Recommendations as listed in PLAN section below  Contact surgical nurse  navigator with any questions regarding preoperative plan or schedule.      Assessment & Plan  Primary osteoarthritis of one hip, left  Failed outpatient conservative measures  Elected to undergo total joint arthroplasty    LOUIE (obstructive sleep apnea)  Cont CPAP  Mixed hyperlipidemia  Continue low cholesterol diet and statin therapy    Gastroesophageal reflux disease, unspecified whether esophagitis present  Continue PPI    Cerebrovascular accident (CVA), unspecified mechanism (HCC)  Hx R CVA tx with TPA and carotid stenting  Recommend cont ASA w/o interruption for surgery  Follows with neurology  Pre-op examination  Use of GLP-1- recommend DC this 1 week prior to sx  Benign essential HTN  Continue metoprolol  Hold ACEI/ARB/diuretics to decrease risk of post operative MARGARITO; post op add hydralazine prn for elevated blood pressures. May resume  these meds upon discharge    Preoperative testing             Plan:     1. Further preoperative workup as follows:   - none no further testing required may proceed with surgery    2. Preoperative Medication Management Review performed by PAT nursing  YES    3. Patient requires further consultation with:   No Consults Required    4. Discharge Planning / Barriers to Discharge  none identified - patients has post discharge therapy plan in place, transportation arranged for discharge day, adequate family support at home to assist with discharge to home.        Subjective:           History of Present Illness:     Neptali Elias is a 59 y.o. male who presents to the office today for a preoperative consultation at the request of surgeon. The patient understands this is an elective procedure and not emergent. They are electing to undergo planned procedure with an understanding that all surgery has inherent risk. They have worked with their surgeon and failed conservative treatment measures. Today they present for preoperative risk assessment and recommendations for optimization in preparation for surgery.        Pt seen in surgical optimization center for upcoming proposed surgery. They have failed previous conservative measures and have elected surgical intervention.     Pt meets presurgical lab and BMI optimization goals.      Neptali Elias has an IN HOSPITAL cardiac risk of RCI RISK CLASS II (1 risk factor, risk of major cardiac compl. appr. 1.3%) based on RCRI calculator    Cardiac Risk Estimation: per the Revised Cardiac Risk Index (Circ. 100:1043, 1999),         Pre-op Exam    Previous history of bleeding disorders or clots?: Yes  Previous Anesthesia reaction?: No  Prolonged steroid use in the last 6 months?: No    Assessment of Cardiac Risk:   - Unstable or severe angina or MI in the last 6 weeks or history of stent placement in the last year?: No   - Decompensated heart failure (e.g. New onset  "heart failure, NYHA  Class IV heart failure, or worsening existing heart failure)?: No  - Significant arrhythmias such as high grade AV block, symptomatic ventricular arrhythmia, newly recognized ventricular tachycardia, supraventricular tachycardia with resting heart rate >100, or symptomatic bradycardia?: No  - Severe heart valve disease including aortic stenosis or symptomatic mitral stenosis?: No      Pre-operative Risk Factors:  Elevated-risk surgery: No    History of cerebrovascular disease: Yes  History of ischemic heart disease: No  Pre-operative treatment with insulin: No  Pre-operative creatinine >2 mg/dL: No    History of congestive heart failure: No    Duke Activity Status Index (DASI):   DASI Total Score: 28.7  METs: 6.3    Medications of Perioperative Concern:   Anti-platelet (aspirin, clopidogrel, ticagrelor, prasugrel, cilostazol, dipyridamole) and ACE inhibitors/ARBs        ROS: No TIA's or unusual headaches, no dysphagia.  No prolonged cough. No dyspnea or chest pain on exertion.  No abdominal pain, change in bowel habits, black or bloody stools.  No urinary tract or BPH symptoms.  Positive reported pain in arthritic joint. Positive difficulty with gait. No skin rashes or issues.      Objective:    /82   Pulse (!) 53   Ht 5' 10\" (1.778 m)   Wt 94.3 kg (208 lb)   BMI 29.84 kg/m²       General Appearance: no distress, conversive  HEENT: PERRLA, conjuctiva normal; oropharynx clear; mucous membranes moist;   Neck:  Supple, no lymphadenopathy or thyromegaly  Lungs: breath sounds normal, normal respiratory effort, no retractions, expiratory effort normal  CV: normal heart sounds S1/S2, PMI normal   ABD: soft non tender, no masses , no hepatic or splenomegaly  EXT: DP pulses intact, no lymphadenopathy, no edema  Skin: normal turgor, normal texture, no rash  Psych: affect normal, mood normal  Neuro: AAOx3        The following portions of the patient's history were reviewed and updated as " appropriate: allergies, current medications, past family history, past medical history, past social history, past surgical history and problem list.     Past History:       Past Medical History:   Diagnosis Date    Colon polyp     GERD (gastroesophageal reflux disease)     Hyperlipidemia     Hypertension     Sleep apnea     Past Surgical History:   Procedure Laterality Date    ACHILLES TENDON REPAIR Right     1983 in [place]    BACK SURGERY  2009    herniated L5-S1 disc repair    COLONOSCOPY            Social History     Tobacco Use    Smoking status: Some Days     Types: Cigars, Cigarettes    Smokeless tobacco: Never    Tobacco comments:     1 cigar a day   Vaping Use    Vaping status: Never Used   Substance Use Topics    Alcohol use: Not Currently     Comment: used to drink 2-3x/week 1 bottle of wine    Drug use: Not Currently     Types: Marijuana     Comment: THC 3 times a week     Family History   Problem Relation Age of Onset    Hypertension Mother     Hyperlipidemia Mother     Prostate cancer Father     Hyperlipidemia Father     Stroke Father     Diabetes type II Brother     Hypertension Brother     Prostate cancer Brother           Allergies:     No Known Allergies     Current Medications:     Current Outpatient Medications   Medication Instructions    amLODIPine (NORVASC) 10 mg, Oral, Daily    ascorbic acid (VITAMIN C) 500 mg tablet TAKE 1 TABLET (500 MG TOTAL) BY MOUTH DAILY BEGIN 30 DAYS PRIOR TO SURGERY    atorvastatin (LIPITOR) 80 mg, Daily    b complex vitamins capsule 1 capsule, Daily    Blood Pressure KIT Does not apply, 2 times daily    Diclofenac Sodium (VOLTAREN) 2 g, Topical, 4 times daily    ferrous sulfate 324 mg, Oral, Daily before breakfast, Begin 30 days prior to surgery    folic acid ( FOLIC ACID) 1 mg, Oral, Daily, Begin 30 days prior to surgery    losartan (COZAAR) 50 mg, Oral, Daily    metoprolol tartrate (LOPRESSOR) 12.5 mg, 2 times daily    Multiple Vitamin (multivitamin) tablet 1  "tablet, Oral, Daily, Begin 30 days prior to surgery    mupirocin (BACTROBAN) 2 % ointment Apply to the inside of the Left and Right Nostril twice a day for 5 days before surgery, including the morning of surgery    omeprazole (PRILOSEC) 20 mg, Daily    tamsulosin (FLOMAX) 0.4 mg    Wegovy 1 mg, Weekly           PRE-OP WORKSHEET DATA    Assessment of Pre-Operative Risks     MLJ Quality Hard Stops:    BMI (<40) : Estimated body mass index is 29.84 kg/m² as calculated from the following:    Height as of this encounter: 5' 10\" (1.778 m).    Weight as of this encounter: 94.3 kg (208 lb).    Hgb ( >11):   Lab Results   Component Value Date    HGB 15.1 01/17/2025    HGB 16.5 01/12/2024    HGB 15.2 06/28/2023       HbA1c (<7.5) :   Lab Results   Component Value Date    HGBA1C 5.5 01/17/2025       GFR (>60) (Less then 45 = Nephrology consult):    Lab Results   Component Value Date    EGFR 92 01/17/2025    EGFR 104 12/19/2024    EGFR 107 09/23/2024            Pre-Op Data Reviewed:       Laboratory Results: I have personally reviewed the pertinent reports    EKG: I personally reviewed and interpreted available tracings in the electronic medical record    Encounter Date: 01/17/25   ECG 12 lead   Result Value    Ventricular Rate 60    Atrial Rate 60    PA Interval 164    QRSD Interval 92    QT Interval 410    QTC Interval 410    P Axis 73    QRS Axis -28    T Wave Axis 181    Narrative    Normal sinus rhythm  Moderate voltage criteria for LVH, may be normal variant  ST & T wave abnormality, consider lateral ischemia  Abnormal ECG  When compared with ECG of 28-Jun-2023 04:12,  Significant changes have occurred  Confirmed by Umesh Crowder ([id number]) on 1/21/2025 6:00:01 AM       OLD RECORDS: reviewed old records in the chart review section if EHR on day of visit.    Previous cardiopulmonary studies within the past year:  Echocardiogram: no   Cardiac Catheterization: no  Stress Test: no      Time of visit including pre-visit chart " review, visit and post-visit coordination of plan and care , review of pre-surgical lab work, preparation and time spent documenting note in electronic medical record, time spent face-to-face in physical examination answering patient questions by care team 35 minutes             [facility]

## 2025-01-13 NOTE — PATIENT INSTRUCTIONS
BEFORE SURGERY    Contact your surgical nurse navigator or surgical provider with any questions regarding preoperative plan or schedule.  Stop all over the counter supplements, herbal, naturopathic  medications for 1 week prior to surgery UNLESS prescribed by your surgeon  Hold NSAIDS (i.e. advil, alleve, motrin, ibuprofen, celebrex) minimum 5 days prior to surgery  Follow presurgical medication instructions provided by preadmission nursing team reviewed during your presurgery phone call  Strategies for optimizing your surgery through breathing exercises, nutrition and physical activity can be found at [email or web address]/best  Call [phone] with any presurgical concerns or medications questions or use the messaging feature in your [facility] howie to contact your provider  Hold current GLP-1 ( Wegovy, Zepbound) 1 week prior to surgery      AFTER SURGERY    Recommend using Tylenol ( acetaminophen ) 1000 mg every eight hours during the first week post discharge along with icing the area for 20 mins every 3-4 hours while awake can be helpful in reducing your need for post operative opioid use. This opioid sparing plan can be used along side your surgeons pain plan.  Use stool softener over the counter (colace) daily after surgery during the first 1-2 weeks to avoid post operative constipation issues  If no bowel movement within 3 days after surgery then use over the counter Miralax in addition to your stool softener   If cleared by your surgical team for activity then early and often walking is encouraged and can be important in prevention of post surgical blood clots. Additionally spend as much time out of bed as possible and allowed by your surgical team  Use your incentive spirometer twice per hour in the first seven days after surgery to help prevent post surgery lung complications and infections  It is very important you follow the instructions from your surgeon regarding any medications for after surgery blood clot  prevention. Compliance with these medications or interventions is very important.  Call [phone] with any post discharge concerns or medical issues or use the messaging feature in your [email or web address] howie to contact your provider

## 2025-01-14 ENCOUNTER — TELEPHONE (OUTPATIENT)
Dept: OBGYN CLINIC | Facility: MEDICAL CENTER | Age: 60
End: 2025-01-14

## 2025-01-14 NOTE — TELEPHONE ENCOUNTER
I tried to call the patient to remind him of his BW and EKG that needs to be done prior to his medical clearance appointment that is scheduled for 1/22. No answer, I left a VM with all the information needed. Left my direct p#.

## 2025-01-15 ENCOUNTER — PATIENT OUTREACH (OUTPATIENT)
Dept: OBGYN CLINIC | Facility: HOSPITAL | Age: 60
End: 2025-01-15

## 2025-01-15 NOTE — PROGRESS NOTES
[facility] attempted to reach pt to follow up in regard to [employer] application. Per pt he did receive [employer] application, but has not completed it yet. Per pt he will complete this week and mail back. [facility] inquired of pt has been able to locate any options to get to and from surgery, and per pt he is still working on it. Pt was reminded that [facility] can offer a lyft to surgery, but if pt does not have an adult to accompany him, he will need a non emergency ambulance ride home at a cost to him. Per pt today, he will be fine and I do not need to worry about him. If pt does DC to home he will have no support. [facility] will message NN in regard to same. [facility] will remain available.

## 2025-01-16 RX ORDER — NALTREXONE HYDROCHLORIDE 50 MG/1
25 TABLET, FILM COATED ORAL DAILY
COMMUNITY
Start: 2024-12-19 | End: 2025-01-22 | Stop reason: ALTCHOICE

## 2025-01-16 NOTE — PRE-PROCEDURE INSTRUCTIONS
Pre-Surgery Instructions:   Medication Instructions    amLODIPine (NORVASC) 10 mg tablet Take day of surgery.    ascorbic acid (VITAMIN C) 500 mg tablet Hold day of surgery.    aspirin (ECOTRIN LOW STRENGTH) 81 mg EC tablet Hold day of surgery.    atorvastatin (LIPITOR) 40 mg tablet Hold day of surgery.    ferrous sulfate 324 (65 Fe) mg Hold day of surgery.    folic acid (KP Folic Acid) 1 mg tablet Hold day of surgery.    hydrochlorothiazide (HYDRODIURIL) 25 mg tablet Hold day of surgery.    losartan (COZAAR) 25 mg tablet Hold day of surgery.    Multiple Vitamin (multivitamin) tablet Hold day of surgery.    mupirocin (BACTROBAN) 2 % ointment Take day of surgery.    naltrexone (REVIA) 50 mg tablet Stop taking 3 days prior to surgery.    omeprazole (PriLOSEC) 20 mg delayed release capsule Stop taking 3 days prior to surgery.    tamsulosin (FLOMAX) 0.4 mg Take day of surgery.    Wegovy 0.5 MG/0.5ML Stop taking 7 days prior to surgery.last dose 1/22/25     ORTHO CLASS     :Ortho wash with chg soap and bactroban ointment  2x a day to nares 5 days including day of surgery. Change linens night before surgery. Bring  walker day of surgery. Wear rubber soled shoes.               Medication instructions for day surgery reviewed. Please use only a sip of water to take your instructed medications. Avoid all over the counter vitamins, supplements and NSAIDS for one week prior to surgery per anesthesia guidelines. Tylenol is ok to take as needed.     You will receive a call one business day prior to surgery with an arrival time and hospital directions. If your surgery is scheduled on a Monday, the hospital will be calling you on the Friday prior to your surgery. If you have not heard from anyone by 8pm, please call the hospital supervisor through the hospital  at [phone]. ([place] [phone] or [place] [phone]).    Do not eat or drink anything after midnight the night before your surgery, including candy,  mints, lifesavers, or chewing gum. Do not drink alcohol 24hrs before your surgery. Try not to smoke at least 24hrs before your surgery.       Follow the pre surgery showering instructions as listed in the “My Surgical Experience Booklet” or otherwise provided by your surgeon's office. Do not use a blade to shave the surgical area 1 week before surgery. It is okay to use a clean electric clippers up to 24 hours before surgery. Do not apply any lotions, creams, including makeup, cologne, deodorant, or perfumes after showering on the day of your surgery. Do not use dry shampoo, hair spray, hair gel, or any type of hair products.     No contact lenses, eye make-up, or artificial eyelashes. Remove nail polish, including gel polish, and any artificial, gel, or acrylic nails if possible. Remove all jewelry including rings and body piercing jewelry.     Wear causal clothing that is easy to take on and off. Consider your type of surgery.    Keep any valuables, jewelry, piercings at home. Please bring any specially ordered equipment (sling, braces) if indicated.    Arrange for a responsible person to drive you to and from the hospital on the day of your surgery. Please confirm the visitor policy for the day of your procedure when you receive your phone call with an arrival time.     Call the surgeon's office with any new illnesses, exposures, or additional questions prior to surgery.    Please reference your “My Surgical Experience Booklet” for additional information to prepare for your upcoming surgery.

## 2025-01-17 ENCOUNTER — APPOINTMENT (OUTPATIENT)
Dept: LAB | Facility: HOSPITAL | Age: 60
End: 2025-01-17
Payer: COMMERCIAL

## 2025-01-17 DIAGNOSIS — M16.12 PRIMARY OSTEOARTHRITIS OF ONE HIP, LEFT: ICD-10-CM

## 2025-01-17 DIAGNOSIS — Z01.818 PRE-OP TESTING: Primary | ICD-10-CM

## 2025-01-17 DIAGNOSIS — Z01.818 PREOPERATIVE TESTING: ICD-10-CM

## 2025-01-17 LAB
ABO GROUP BLD: NORMAL
ALBUMIN SERPL BCG-MCNC: 4.5 G/DL (ref 3.5–5)
ALP SERPL-CCNC: 57 U/L (ref 34–104)
ALT SERPL W P-5'-P-CCNC: 12 U/L (ref 7–52)
ANION GAP SERPL CALCULATED.3IONS-SCNC: 5 MMOL/L (ref 4–13)
APTT PPP: 32 SECONDS (ref 23–34)
AST SERPL W P-5'-P-CCNC: 11 U/L (ref 13–39)
BASOPHILS # BLD AUTO: 0.04 THOUSANDS/ΜL (ref 0–0.1)
BASOPHILS NFR BLD AUTO: 1 % (ref 0–1)
BILIRUB SERPL-MCNC: 0.52 MG/DL (ref 0.2–1)
BUN SERPL-MCNC: 8 MG/DL (ref 5–25)
CALCIUM SERPL-MCNC: 9.5 MG/DL (ref 8.4–10.2)
CHLORIDE SERPL-SCNC: 104 MMOL/L (ref 96–108)
CO2 SERPL-SCNC: 30 MMOL/L (ref 21–32)
CREAT SERPL-MCNC: 0.9 MG/DL (ref 0.6–1.3)
EOSINOPHIL # BLD AUTO: 0.07 THOUSAND/ΜL (ref 0–0.61)
EOSINOPHIL NFR BLD AUTO: 1 % (ref 0–6)
ERYTHROCYTE [DISTWIDTH] IN BLOOD BY AUTOMATED COUNT: 14.1 % (ref 11.6–15.1)
EST. AVERAGE GLUCOSE BLD GHB EST-MCNC: 111 MG/DL
GFR SERPL CREATININE-BSD FRML MDRD: 92 ML/MIN/1.73SQ M
GLUCOSE P FAST SERPL-MCNC: 98 MG/DL (ref 65–99)
HBA1C MFR BLD: 5.5 %
HCT VFR BLD AUTO: 47.9 % (ref 36.5–49.3)
HGB BLD-MCNC: 15.1 G/DL (ref 12–17)
IMM GRANULOCYTES # BLD AUTO: 0.03 THOUSAND/UL (ref 0–0.2)
IMM GRANULOCYTES NFR BLD AUTO: 0 % (ref 0–2)
INR PPP: 0.96 (ref 0.85–1.19)
LYMPHOCYTES # BLD AUTO: 1.86 THOUSANDS/ΜL (ref 0.6–4.47)
LYMPHOCYTES NFR BLD AUTO: 26 % (ref 14–44)
MCH RBC QN AUTO: 26.5 PG (ref 26.8–34.3)
MCHC RBC AUTO-ENTMCNC: 31.5 G/DL (ref 31.4–37.4)
MCV RBC AUTO: 84 FL (ref 82–98)
MONOCYTES # BLD AUTO: 0.51 THOUSAND/ΜL (ref 0.17–1.22)
MONOCYTES NFR BLD AUTO: 7 % (ref 4–12)
NEUTROPHILS # BLD AUTO: 4.69 THOUSANDS/ΜL (ref 1.85–7.62)
NEUTS SEG NFR BLD AUTO: 65 % (ref 43–75)
NRBC BLD AUTO-RTO: 0 /100 WBCS
PLATELET # BLD AUTO: 271 THOUSANDS/UL (ref 149–390)
PMV BLD AUTO: 9.8 FL (ref 8.9–12.7)
POTASSIUM SERPL-SCNC: 4.6 MMOL/L (ref 3.5–5.3)
PROT SERPL-MCNC: 7.3 G/DL (ref 6.4–8.4)
PROTHROMBIN TIME: 13.1 SECONDS (ref 12.3–15)
RBC # BLD AUTO: 5.7 MILLION/UL (ref 3.88–5.62)
RH BLD: POSITIVE
SODIUM SERPL-SCNC: 139 MMOL/L (ref 135–147)
WBC # BLD AUTO: 7.2 THOUSAND/UL (ref 4.31–10.16)

## 2025-01-17 PROCEDURE — 85025 COMPLETE CBC W/AUTO DIFF WBC: CPT

## 2025-01-17 PROCEDURE — 86901 BLOOD TYPING SEROLOGIC RH(D): CPT

## 2025-01-17 PROCEDURE — 85730 THROMBOPLASTIN TIME PARTIAL: CPT

## 2025-01-17 PROCEDURE — 80053 COMPREHEN METABOLIC PANEL: CPT

## 2025-01-17 PROCEDURE — 93005 ELECTROCARDIOGRAM TRACING: CPT

## 2025-01-17 PROCEDURE — 86900 BLOOD TYPING SEROLOGIC ABO: CPT

## 2025-01-17 PROCEDURE — 36415 COLL VENOUS BLD VENIPUNCTURE: CPT

## 2025-01-17 PROCEDURE — 86850 RBC ANTIBODY SCREEN: CPT

## 2025-01-17 PROCEDURE — 85610 PROTHROMBIN TIME: CPT

## 2025-01-17 PROCEDURE — 83036 HEMOGLOBIN GLYCOSYLATED A1C: CPT

## 2025-01-18 LAB
ABO GROUP BLD: NORMAL
BLD GP AB SCN SERPL QL: NEGATIVE
RH BLD: POSITIVE
SPECIMEN EXPIRATION DATE: NORMAL

## 2025-01-21 LAB
ATRIAL RATE: 60 BPM
P AXIS: 73 DEGREES
PR INTERVAL: 164 MS
QRS AXIS: -28 DEGREES
QRSD INTERVAL: 92 MS
QT INTERVAL: 410 MS
QTC INTERVAL: 410 MS
T WAVE AXIS: 181 DEGREES
VENTRICULAR RATE: 60 BPM

## 2025-01-21 PROCEDURE — 93010 ELECTROCARDIOGRAM REPORT: CPT

## 2025-01-22 ENCOUNTER — PATIENT OUTREACH (OUTPATIENT)
Dept: OBGYN CLINIC | Facility: HOSPITAL | Age: 60
End: 2025-01-22

## 2025-01-22 ENCOUNTER — OFFICE VISIT (OUTPATIENT)
Age: 60
End: 2025-01-22
Payer: COMMERCIAL

## 2025-01-22 ENCOUNTER — OFFICE VISIT (OUTPATIENT)
Dept: CARDIOLOGY CLINIC | Facility: CLINIC | Age: 60
End: 2025-01-22
Payer: COMMERCIAL

## 2025-01-22 VITALS
DIASTOLIC BLOOD PRESSURE: 80 MMHG | WEIGHT: 208 LBS | SYSTOLIC BLOOD PRESSURE: 144 MMHG | HEIGHT: 70 IN | BODY MASS INDEX: 29.78 KG/M2 | HEART RATE: 52 BPM

## 2025-01-22 VITALS
BODY MASS INDEX: 29.78 KG/M2 | HEIGHT: 70 IN | HEART RATE: 53 BPM | WEIGHT: 208 LBS | SYSTOLIC BLOOD PRESSURE: 155 MMHG | DIASTOLIC BLOOD PRESSURE: 82 MMHG

## 2025-01-22 DIAGNOSIS — G47.33 OSA (OBSTRUCTIVE SLEEP APNEA): Primary | ICD-10-CM

## 2025-01-22 DIAGNOSIS — I63.9 CEREBROVASCULAR ACCIDENT (CVA), UNSPECIFIED MECHANISM (HCC): ICD-10-CM

## 2025-01-22 DIAGNOSIS — K21.9 GASTROESOPHAGEAL REFLUX DISEASE, UNSPECIFIED WHETHER ESOPHAGITIS PRESENT: ICD-10-CM

## 2025-01-22 DIAGNOSIS — G47.33 OSA (OBSTRUCTIVE SLEEP APNEA): ICD-10-CM

## 2025-01-22 DIAGNOSIS — M16.12 PRIMARY OSTEOARTHRITIS OF ONE HIP, LEFT: ICD-10-CM

## 2025-01-22 DIAGNOSIS — Z01.818 PRE-OP EXAMINATION: ICD-10-CM

## 2025-01-22 DIAGNOSIS — Z01.818 PREOPERATIVE TESTING: ICD-10-CM

## 2025-01-22 DIAGNOSIS — I10 BENIGN ESSENTIAL HTN: ICD-10-CM

## 2025-01-22 DIAGNOSIS — E78.2 MIXED HYPERLIPIDEMIA: ICD-10-CM

## 2025-01-22 DIAGNOSIS — Z01.818 PRE-OP TESTING: ICD-10-CM

## 2025-01-22 DIAGNOSIS — M16.12 PRIMARY OSTEOARTHRITIS OF ONE HIP, LEFT: Primary | ICD-10-CM

## 2025-01-22 DIAGNOSIS — Z01.810 PRE-OPERATIVE CARDIOVASCULAR EXAMINATION: Primary | ICD-10-CM

## 2025-01-22 DIAGNOSIS — I10 ESSENTIAL HYPERTENSION: ICD-10-CM

## 2025-01-22 PROCEDURE — 99215 OFFICE O/P EST HI 40 MIN: CPT | Performed by: INTERNAL MEDICINE

## 2025-01-22 PROCEDURE — 93000 ELECTROCARDIOGRAM COMPLETE: CPT | Performed by: INTERNAL MEDICINE

## 2025-01-22 PROCEDURE — 99244 OFF/OP CNSLTJ NEW/EST MOD 40: CPT | Performed by: INTERNAL MEDICINE

## 2025-01-22 RX ORDER — ATORVASTATIN CALCIUM 80 MG/1
80 TABLET, FILM COATED ORAL DAILY
COMMUNITY
Start: 2025-01-19

## 2025-01-22 RX ORDER — LOSARTAN POTASSIUM 50 MG/1
50 TABLET ORAL DAILY
Qty: 30 TABLET | Refills: 5 | Status: SHIPPED | OUTPATIENT
Start: 2025-01-22

## 2025-01-22 RX ORDER — SEMAGLUTIDE 1 MG/.5ML
1 INJECTION, SOLUTION SUBCUTANEOUS WEEKLY
COMMUNITY
Start: 2025-01-06

## 2025-01-22 NOTE — ASSESSMENT & PLAN NOTE
Blood pressure elevated.  Changes being made to medical regimen as outlined above.  Orders:    losartan (Cozaar) 50 mg tablet; Take 1 tablet (50 mg total) by mouth daily

## 2025-01-22 NOTE — ASSESSMENT & PLAN NOTE
Has no history of sleep apnea but reports intolerance to statin use.  Advised follow-up with sleep medicine specialist to consider other options in the future.

## 2025-01-22 NOTE — PROGRESS NOTES
Name: Neptali Elias      : 1965      MRN: [id number]  Encounter Provider: Shadi Bowman MD  Encounter Date: 2025   Encounter department: [facility] CARDIOLOGY [place]  :  Assessment & Plan  Pre-operative cardiovascular examination  Mr. Neptali Elias is scheduled to undergo left total hip replacement surgery, procedure with inherent intermediate cardiac risk.  He has no active cardiac conditions besides blood pressure being elevated.  His major risk factors include known history of ASCVD with history of stroke and carotid artery disease, hypertension, dyslipidemia and tobacco use.  Blood pressure is noted to be elevated.  Physical examination is significant for increased BMI.  There is no pulmonary or peripheral vascular congestion or signs of valvular heart disease on exam.  He has had extensive testing including echocardiogram and nuclear stress test and extended Holter monitor last year.  He was previously on beta-blocker therapy but this was discontinued due to bradycardia and borderline blood pressures.  His ECG is abnormal with presence of T wave inversions in inferior and lateral leads however these were present in previous ECGs from [facility] and also and likely represent secondary repolarization abnormalities to left ventricular hypertrophy.  His renal function and electrolytes are normal.  His lipids are well-controlled.    Considering all factors his overall risk for major adverse cardiac events relating to planned surgery is moderate.  In the absence of concerning symptoms no further cardiac testing is needed prior to the surgery.    He may proceed with planned surgery without further cardiac testing.  He will need close monitoring for blood pressure fluctuations and cardiac dysrhythmia given history previously.  He will also need close airway monitoring due to his history of obstructive sleep apnea.  As his blood pressure is elevated I am increasing the dose of his losartan  to 50 mg once daily.  A prescription is sent to his pharmacy.  I am advising him to monitor blood pressures at home.  Goal blood pressure is 130 mmHg or less systolic and he 80 mmHg or less diastolic.  Advising to follow-up with primary care physician closely afterwards.  I am also advising him to follow-up with his cardiologist at [facility] whom he sees regularly.  I am strongly urging him to quitting smoking as this is a single major risk factor for future cardiovascular events.  We will continue his statin therapy and amlodipine and other medications.      Orders:    POCT ECG    Primary osteoarthritis of one hip, left  Evaluation plan as outlined above.  Orders:    Ambulatory referral to Cardiology    Essential hypertension  Blood pressure elevated.  Changes being made to medical regimen as outlined above.  Orders:    losartan (Cozaar) 50 mg tablet; Take 1 tablet (50 mg total) by mouth daily    LOUIE (obstructive sleep apnea)  Has no history of sleep apnea but reports intolerance to statin use.  Advised follow-up with sleep medicine specialist to consider other options in the future.           History of Present Illness   HPI  Neptali Elias is a 59 y.o. male who presents regarding preoperative cardiac risk assessment prior to undergoing left total hip replacement surgery.  His medical history significant for:    Carotid artery disease,  History of CVA due to right cervical ICA occlusion, right proximal M2 occlusion, status post tPA, status post mechanical thrombectomy and right carotid artery stent placement  Obstructive sleep apnea, intolerant to CPAP use  GERD  Tobacco use disorder and dependence due to smoking  Mixed dyslipidemia  Obesity  Bilateral carpal tunnel syndrome and ulnar neuropathy  Diverticulosis  GERD  Small sliding hernia  History of ventricular tachycardia during hospitalization for stroke in January 2024.  Degenerative joint disease, history of back surgery in  2009    He has no previous history of diabetes.  He had a ischemic CVA in January 2024 and received tPA.  He was found to have occlusion in carotid artery in neck and in M2 segment of middle cerebral artery.  He underwent carotid stenting on January 14, 2024.  He reports that his residual deficits from stroke include loss of heat and cold discrimination in his left upper extremity.  Denies any visual symptoms or speech abnormalities or focal weakness.  From a cardiac perspective he denies any unusual chest pain shortness of breath or dizziness or lightheadedness or palpitations.  He denies any orthopnea PND or worsening pedal edema.  He has known sleep apnea and was prescribed to use CPAP but he reports that he has been intolerant to it and he has ordered a dental guard and is going to try that.    He has no previous history of coronary artery disease or congestive heart failure.    Reports that he goes to gym 3 times a week.  He denies experiencing any decline in his exercise tolerance recently.    He is reports that he smokes occasionally cigars.  Reports that he used to drink but has stopped recently about 2 months back.  Denies any marijuana or any other drug use.    Reports strong family history of hypertension but denies history of premature CAD or sudden cardiac death in family    He works as a  for [employer].    Functional capacity status: Good   (Excellent- >10 METs; Good: (7-10 METs); Moderate (4-7 METs); Poor (<= 4 METs)    Current cardiac medications: Amlodipine 10 mg daily Losartan 25 daily, Aspirin 81 mg daily atorvastatin 80 mg daily metoprolol to tartrate 12.5 g twice daily but patient reports that he is not taking this medication.  He is on Wegovy weekly.    Last recent comprehensive blood work available:   Blood work 1/17/2025 sodium 139 potassium 4.6 chloride 104 bicarb 30 BUN 8 creatinine 0.90 GFR 92  Normal LFTs  Hemoglobin 15.1 hematocrit 47.9 platelet count  9.8  Hemoglobin A1c 5.5  TSH 0.77 in April 2024  Lipid profile 12/19/2024 total cholesterol 123 triglyceride 56 HDL 61 calculated LDL 51.    [employer] XT extended Holter monitor February 2024: 2 runs of SVT up to 10 beats.  No evidence of ventricular tachycardia or significant pause events.  Predominant rhythm was sinus with average heart rate 72 minimum 33 maximum 122.  Patient's symptoms correlated with sinus rhythm and ventricular ectopy.    Nuclear stress test 3/19/2024 [facility]: Medium size fixed perfusion abnormality in the apical to basal inferior location accompanied with normal regional wall motion suggesting likely diaphragmatic attenuation rather than scar.  There was no evidence of ischemia.  Resting ECG showed sinus rhythm with inferolateral T wave inversions.    Echocardiogram 1/15/2024: Mild left ventricular hypertrophy, normal left ventricular systolic function EF 60 to 65%, normal diastolic function, normal RV size and function -- negative bubble study for interatrial shunts -- mildly increased left atrial size normal right atrial size -- mitral valve posterior annular calcification, mild mitral valve regurgitation -- mildly calcified aortic valve without stenosis or regurgitation -- trace tricuspid valve regurgitation -- normal aorta -- no pulmonary hypertension -- no pericardial effusion.    ECG: No results found for this visit on 01/22/25.    REVIEW OF SYSTEMS   Positive for: As noted above in HPI  Negative for: All remaining as reviewed below and in HPI.    SYSTEM SYMPTOMS REVIEWED:  General--weight change, fever, night sweats  Respiratory--cough, wheezing, shortness of breath, sputum production  Cardiovascular--chest pain, syncope, dyspnea on exertion, edema, decline in exercise tolerance, claudication   Gastrointestinal--persistent vomiting, diarrhea, abdominal distention, blood in stool   Muscular or skeletal--joint pain or swelling   Neurologic--headaches, syncope, abnormal  movement  Hematologic--history of easy bruising and bleeding   Endocrine--thyroid enlargement, heat or cold intolerance, polyuria   Psychiatric--anxiety, depression     CURRENT MEDICATIONS LIST     Current Outpatient Medications:     amLODIPine (NORVASC) 10 mg tablet, TAKE 1 TABLET BY MOUTH EVERY DAY, Disp: 90 tablet, Rfl: 1    ascorbic acid (VITAMIN C) 500 mg tablet, TAKE 1 TABLET (500 MG TOTAL) BY MOUTH DAILY BEGIN 30 DAYS PRIOR TO SURGERY, Disp: 30 tablet, Rfl: 1    aspirin (ECOTRIN LOW STRENGTH) 81 mg EC tablet, Take 81 mg by mouth daily, Disp: , Rfl:     atorvastatin (LIPITOR) 80 mg tablet, Take 80 mg by mouth daily, Disp: , Rfl:     b complex vitamins capsule, Take 1 capsule by mouth daily, Disp: , Rfl:     Blood Pressure KIT, Use 2 (two) times a day, Disp: 1 kit, Rfl: 0    ferrous sulfate 324 (65 Fe) mg, Take 1 tablet (324 mg total) by mouth daily before breakfast Begin 30 days prior to surgery, Disp: 30 tablet, Rfl: 1    folic acid (KP Folic Acid) 1 mg tablet, Take 1 tablet (1 mg total) by mouth daily Begin 30 days prior to surgery, Disp: 30 tablet, Rfl: 1    losartan (COZAAR) 25 mg tablet, Take 1 tablet (25 mg total) by mouth daily for 180 doses, Disp: 90 tablet, Rfl: 1    Multiple Vitamin (multivitamin) tablet, Take 1 tablet by mouth daily Begin 30 days prior to surgery, Disp: 30 tablet, Rfl: 1    mupirocin (BACTROBAN) 2 % ointment, Apply to the inside of the Left and Right Nostril twice a day for 5 days before surgery, including the morning of surgery, Disp: 15 g, Rfl: 0    omeprazole (PriLOSEC) 20 mg delayed release capsule, Take 20 mg by mouth daily, Disp: , Rfl:     Wegovy 1 MG/0.5ML, Inject 1 mg under the skin once a week, Disp: , Rfl:     Diclofenac Sodium (VOLTAREN) 1 %, Apply 2 g topically 4 (four) times a day (Patient not taking: Reported on 1/22/2025), Disp: 150 g, Rfl: 0    hydrochlorothiazide (HYDRODIURIL) 25 mg tablet, Take 1 tablet (25 mg total) by mouth daily (Patient not taking: Reported on  "1/22/2025), Disp: 30 tablet, Rfl: 1    losartan (Cozaar) 50 mg tablet, Take 1 tablet (50 mg total) by mouth daily (Patient not taking: Reported on 1/22/2025), Disp: 30 tablet, Rfl: 5    metoprolol tartrate (LOPRESSOR) 25 mg tablet, Take 12.5 mg by mouth 2 (two) times a day (Patient not taking: Reported on 1/22/2025), Disp: , Rfl:     tamsulosin (FLOMAX) 0.4 mg, Take 0.4 mg by mouth (Patient not taking: Reported on 1/22/2025), Disp: , Rfl:     Current Facility-Administered Medications:     ropivacaine (NAROPIN) injection 4 mL, 4 mL, Intra-articular, Titrated, , 4 mL at 07/22/24 1446       ALLERGIES   No Known Allergies    *-*-*-*-*-*-*-*-*-*-*-*-*-*-*-*-*-*-*-*-*-*-*-*-*-*-*-*-*-*-*-*-*-*-*-*-*-*-*-*-*-*-*-*-*-*-*-*-*-*-*-*-*-*-         Objective   /80   Pulse (!) 52   Ht 5' 10\" (1.778 m)   Wt 94.3 kg (208 lb)   BMI 29.84 kg/m²      Physical Exam  Constitutional:       Appearance: He is obese.   HENT:      Mouth/Throat:      Mouth: Mucous membranes are moist.   Neck:      Vascular: No carotid bruit.   Cardiovascular:      Rate and Rhythm: Regular rhythm. Bradycardia present.   Abdominal:      Palpations: Abdomen is soft.   Musculoskeletal:      Cervical back: Neck supple.      Right lower leg: No edema.      Left lower leg: No edema.   Skin:     General: Skin is warm and dry.   Neurological:      Mental Status: He is oriented to person, place, and time. Mental status is at baseline.   Psychiatric:         Behavior: Behavior normal.           "

## 2025-01-22 NOTE — PROGRESS NOTES
[facility] attempted to contact pt today to follow up in regard to [employer] application. When [facility] last spoke with pt he had no ride planned to or from surgery. Pt had not completed [employer] application. Pt had no caregiver support planned. I was unable to reach pt today and a message was left to please return [facility] call. [facility] will remain available.

## 2025-01-27 ENCOUNTER — EVALUATION (OUTPATIENT)
Dept: PHYSICAL THERAPY | Facility: CLINIC | Age: 60
End: 2025-01-27
Payer: COMMERCIAL

## 2025-01-27 DIAGNOSIS — M16.12 PRIMARY OSTEOARTHRITIS OF ONE HIP, LEFT: ICD-10-CM

## 2025-01-27 DIAGNOSIS — Z01.818 PREOPERATIVE TESTING: ICD-10-CM

## 2025-01-27 PROCEDURE — 97161 PT EVAL LOW COMPLEX 20 MIN: CPT

## 2025-01-27 PROCEDURE — 97110 THERAPEUTIC EXERCISES: CPT

## 2025-01-27 NOTE — PROGRESS NOTES
PT Evaluation     Today's date: 2025  Patient name: Neptali Elias  : 1965  MRN: [id number]  Referring provider: Rosalia Higuera,*  Dx:   Encounter Diagnosis     ICD-10-CM    1. Primary osteoarthritis of one hip, left  M16.12 Ambulatory referral to Physical Therapy      2. Preoperative testing  Z01.818 Ambulatory referral to Physical Therapy          Start Time: 1410  Stop Time: 1450  Total time in clinic (min): 40 minutes    Assessment  Impairments: abnormal muscle firing, abnormal or restricted ROM, activity intolerance, impaired physical strength, lacks appropriate home exercise program, pain with function, weight-bearing intolerance and poor body mechanics  Symptom irritability: moderate    Assessment details: Pt is a 59 y.o. year old male presenting to physical therapy for Primary osteoarthritis of one hip, left and Preoperative testing.  He presents with the following impairments: decreased LLE strength, decreased LLE hip AROM, decreased L quad activation, decreased L SLS ability, weight bearing intolerance, poor squat mechanics, and antalgic gait affecting his function with walking, standing, navigating stairs, exercising, running, ADLs, and functional ability.  Pt will benefit from skilled physical therapy to address functional limitations noted in evaluation and meet patient goals.   Barriers to therapy: S/p L anterior ADDISON on 25.  Understanding of Dx/Px/POC: good     Prognosis: good    Goals  ST. Pt will be independent with HEP.  2. Pt will improve L hip flexion AROM to 90 degrees or more in 3-4 weeks.   3. Pt will improve L quad activation to good.  LT. Pt will improve pain at rest to 0/10.  2. Pt will improve LLE strength grossly by 2 grades or more to improve functional ability.  3. Pt will improve squat mechanics to WNL.     Plan  Patient would benefit from: PT eval and skilled physical therapy  Planned modality interventions: biofeedback, manual electrical  stimulation, microcurrent electrical stimulation, TENS, electrical stimulation/Russian stimulation, thermotherapy: hydrocollator packs, cryotherapy and unattended electrical stimulation    Planned therapy interventions: abdominal trunk stabilization, joint mobilization, manual therapy, massage, ADL retraining, neuromuscular re-education, body mechanics training, patient education, postural training, strengthening, stretching, therapeutic activities, therapeutic exercise, flexibility, functional ROM exercises and home exercise program    Frequency: 2x week  Duration in weeks: 12  Treatment plan discussed with: patient        Subjective Evaluation    History of Present Illness  Mechanism of injury: Pt presents to the clinic for pre operative testing for L anterior ADDISON scheduled for 25. Pt stated that he is nervous and excited for the surgery. Pt stated that he has had pain in his L hip for the past 4 years and has had a lot of difficulty walking, running, navigating stairs, ADLs, and bringing his leg up for dressing. Pt stated that he likes to run and would like to get back to doing that after the surgery. Pt stated that movement usually makes his pain worse and lying on it but at rest does not have any pain. 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  Patient Goals  Patient goals for therapy: increased motion, improved balance, decreased pain, increased strength, independence with ADLs/IADLs, return to sport/leisure activities and return to work    Pain  Current pain ratin  At best pain ratin  At worst pain ratin  Quality: discomfort, dull ache, pressure, tight and sharp          Objective     Tenderness     Left Hip   Tenderness in the greater trochanter.     Right Hip   No tenderness in the greater trochanter.     Active Range of Motion   Left Hip   Flexion: 74 degrees     Right Hip   Flexion: 108 degrees     Strength/Myotome Testing     Left Hip   Planes of Motion  "  Flexion: 3+  Extension: 3+  Abduction: 3+    Right Hip   Planes of Motion   Flexion: 5  Extension: 4+  Abduction: 5    Left Knee   Flexion: 4+  Extension: 4  Quadriceps contraction: fair    Right Knee   Flexion: 5  Extension: 5  Quadriceps contraction: good    General Comments:      Hip Comments   L SLS: 1 second  R SLS: 5 seconds.    Poor squat mechanics.  Antalgic gait noted.           Precautions: s/p anterior L ADDISON on 2/4/25.   Avoid hip extension, adduction, and external rotation for first weeks    Date 1/27            Visit # 1            FOTO IE             Re-eval IE              Manuals 1/27            L hip PROM                                                    Neuro Re-Ed 1/27            Quad sets 10x3\" L            Glute sets             Heel slides 5x10\"            Clamshells                                                    Ther Ex 1/27            bike             SLR 10x L            S/l hip abd             Standing hip abd/flex 10x L abd            marches             Leg press             HS str             sidestepping             Monster walks                                       Ther Activity 1/27            Step ups             Squats 10x             Gait Training 1/27                                      Modalities 1/27            ice prn                              "

## 2025-01-28 ENCOUNTER — PATIENT OUTREACH (OUTPATIENT)
Dept: OBGYN CLINIC | Facility: HOSPITAL | Age: 60
End: 2025-01-28

## 2025-01-28 NOTE — PROGRESS NOTES
[facility] made third attempt to reach pt to discuss caregiver support and transportation as pts surgery date approaches. I was unable to reach pt and a message was left to please return [facility] call. When [facility] last spoke to pt, he had no ride planned to and from surgery, [facility] would like to inquire of pt has secured a ride to surgery. A message was left to please return [facility] call. [facility] will remain available.     [facility] will update NN I have not received return call from pt. [facility] will be out of office on 02/03 and 02/04 if pt requires lyft to surgery.

## 2025-01-29 ENCOUNTER — OFFICE VISIT (OUTPATIENT)
Dept: OBGYN CLINIC | Facility: MEDICAL CENTER | Age: 60
End: 2025-01-29
Payer: COMMERCIAL

## 2025-01-29 ENCOUNTER — APPOINTMENT (OUTPATIENT)
Dept: RADIOLOGY | Facility: MEDICAL CENTER | Age: 60
End: 2025-01-29
Payer: COMMERCIAL

## 2025-01-29 VITALS — BODY MASS INDEX: 28.09 KG/M2 | WEIGHT: 196.2 LBS | HEIGHT: 70 IN

## 2025-01-29 DIAGNOSIS — M16.12 PRIMARY OSTEOARTHRITIS OF ONE HIP, LEFT: ICD-10-CM

## 2025-01-29 DIAGNOSIS — M16.12 PRIMARY OSTEOARTHRITIS OF ONE HIP, LEFT: Primary | ICD-10-CM

## 2025-01-29 PROCEDURE — 99213 OFFICE O/P EST LOW 20 MIN: CPT | Performed by: ORTHOPAEDIC SURGERY

## 2025-01-29 PROCEDURE — 73502 X-RAY EXAM HIP UNI 2-3 VIEWS: CPT

## 2025-01-29 NOTE — PROGRESS NOTES
Assessment & Plan     1. Primary osteoarthritis of one hip, left      Orders Placed This Encounter   Procedures    XR hip/pelv 2-3 vws left if performed     Patient has severe left hip osteoarthritis, he has Left ADDISON scheduled for 2/4/25.  Updated x-rays were obtained and reviewed at today's visit.  Patient will be on ASA 81 mg for DVT prophylaxis, he was told to stop ASA 81 mg one day prior to surgery as he has history of a stroke.  He understands that he may have increased blood loss than average due to not being able to appropriately stop aspirin and his surgery may take a little longer due to this as well.   Patient understands to stop Wegovy 1 week before surgery. He states he stopped taking wegovy 1/8/25.  He obtained clearance from SOC and cardiology.  He has a friend to bring him to the hospital, pick him up, and stay with him for the first few days.  He understands the importance of this.    We discussed that his L leg feels longer than the right and that we would work to make his legs equal as best we could while also providing good biomechanics for his hip.   All the patient's questions and concerns were addressed at today's visit.      Return for 2-week left ADDISON postop appointment.    I answered all of the patient's questions during the visit and provided education of the patient's condition during the visit.  The patient verbalized understanding of the information given and agrees with the plan.  This note was dictated using [email or web address] software.  It may contain errors including improperly dictated words.  Please contact physician directly for any questions.    Subjective   Chief Complaint:   Chief Complaint   Patient presents with    Left Hip - Follow-up       HPI:  Neptali Elias is a 59 y.o. male who presents for follow up for severe left hip osteoarthritis.  Patient has left ADDISON scheduled for 2/4/25.  Patient states he was able to obtain clearance from SOC and cardiology.  Patient states he has  been taking ASA 81 mg for DVT prophylaxis.  Patient has a history of stroke last year.  Patient was told he is supposed to stop taking ASA 81 mg 1 day prior to surgery.  Patient states he stopped taking Wegovy on 1/8/2025.  Patient states that his left leg feels shorter than his right.  Patient is ready for left ADDISON.      Review of Systems  See HPI for musculoskeletal review.   All other systems reviewed are negative     History:  Past Medical History:   Diagnosis Date    Colon polyp     GERD (gastroesophageal reflux disease)     Hyperlipidemia     Hypertension     Sleep apnea      Past Surgical History:   Procedure Laterality Date    ACHILLES TENDON REPAIR Right     1983 in [place]    BACK SURGERY  2009    herniated L5-S1 disc repair    COLONOSCOPY       Social History   Social History     Substance and Sexual Activity   Alcohol Use Not Currently    Comment: used to drink 2-3x/week 1 bottle of wine     Social History     Substance and Sexual Activity   Drug Use Not Currently    Types: Marijuana    Comment: THC 3 times a week     Social History     Tobacco Use   Smoking Status Some Days    Types: Cigars, Cigarettes   Smokeless Tobacco Never   Tobacco Comments    1 cigar a day     Family History:   Family History   Problem Relation Age of Onset    Hypertension Mother     Hyperlipidemia Mother     Prostate cancer Father     Hyperlipidemia Father     Stroke Father     Diabetes type II Brother     Hypertension Brother     Prostate cancer Brother        Current Outpatient Medications on File Prior to Visit   Medication Sig Dispense Refill    amLODIPine (NORVASC) 10 mg tablet TAKE 1 TABLET BY MOUTH EVERY DAY 90 tablet 1    ascorbic acid (VITAMIN C) 500 mg tablet TAKE 1 TABLET (500 MG TOTAL) BY MOUTH DAILY BEGIN 30 DAYS PRIOR TO SURGERY 30 tablet 1    atorvastatin (LIPITOR) 80 mg tablet Take 80 mg by mouth daily      b complex vitamins capsule Take 1 capsule by mouth daily      Blood Pressure KIT Use 2 (two) times a day 1  "kit 0    Diclofenac Sodium (VOLTAREN) 1 % Apply 2 g topically 4 (four) times a day (Patient not taking: Reported on 1/22/2025) 150 g 0    ferrous sulfate 324 (65 Fe) mg Take 1 tablet (324 mg total) by mouth daily before breakfast Begin 30 days prior to surgery 30 tablet 1    folic acid (KP Folic Acid) 1 mg tablet Take 1 tablet (1 mg total) by mouth daily Begin 30 days prior to surgery 30 tablet 1    losartan (Cozaar) 50 mg tablet Take 1 tablet (50 mg total) by mouth daily 30 tablet 5    metoprolol tartrate (LOPRESSOR) 25 mg tablet Take 12.5 mg by mouth 2 (two) times a day (Patient not taking: Reported on 1/22/2025)      Multiple Vitamin (multivitamin) tablet Take 1 tablet by mouth daily Begin 30 days prior to surgery 30 tablet 1    mupirocin (BACTROBAN) 2 % ointment Apply to the inside of the Left and Right Nostril twice a day for 5 days before surgery, including the morning of surgery 15 g 0    omeprazole (PriLOSEC) 20 mg delayed release capsule Take 20 mg by mouth daily      tamsulosin (FLOMAX) 0.4 mg Take 0.4 mg by mouth (Patient not taking: Reported on 1/22/2025)      Wegovy 1 MG/0.5ML Inject 1 mg under the skin once a week       Current Facility-Administered Medications on File Prior to Visit   Medication Dose Route Frequency Provider Last Rate Last Admin    ropivacaine (NAROPIN) injection 4 mL  4 mL Intra-articular Titrated    4 mL at 07/22/24 1446     No Known Allergies     Objective     Ht 5' 10\" (1.778 m)   Wt 89 kg (196 lb 3.2 oz)   BMI 28.15 kg/m²      PE:  AAOx 3  WDWN  Hearing intact, no drainage from eyes  no audible wheezing  no abdominal distension  LE compartments soft, skin intact    left hip:   No dislocation/deformity  [facility]  ROM: 90 flexion, 35 ER, 10 IR pain in all directions  Pos. Florian Test  pos. Impingement test  No TTP over greater trochanter  Abduction: 5/5  Neg. Xena's test  mild TTP over SIJ     AT/GS intact    Scribe Attestation      I,:  Navin Perea am acting as a " scribe while in the presence of the attending physician.:       I,:  Rosalia Higuera, DO personally performed the services described in this documentation    as scribed in my presence.:

## 2025-01-30 ENCOUNTER — TELEPHONE (OUTPATIENT)
Age: 60
End: 2025-01-30

## 2025-01-30 NOTE — TELEPHONE ENCOUNTER
Spoke to pt - agreeable to procedure with Dr. Reji Gramajo    Informed pt Dr Everett is leaving [facility]

## 2025-02-03 ENCOUNTER — ANESTHESIA EVENT (OUTPATIENT)
Dept: PERIOP | Facility: HOSPITAL | Age: 60
End: 2025-02-03
Payer: COMMERCIAL

## 2025-02-03 NOTE — ANESTHESIA PREPROCEDURE EVALUATION
Procedure:  ARTHROPLASTY HIP TOTAL ANTERIOR,NAVIGATED, potential same day discharge (Left: Hip)    Relevant Problems   CARDIO   (+) Calcification of right carotid artery   (+) Essential hypertension   (+) External hemorrhoids   (+) Mixed hyperlipidemia      GI/HEPATIC   (+) Gastroesophageal reflux disease   (+) Sliding hiatal hernia      MUSCULOSKELETAL   (+) Chronic midline low back pain   (+) Primary osteoarthritis of one hip, left   (+) Sliding hiatal hernia      NEURO/PSYCH   (+) Cerebrovascular accident (CVA) (HCC)   (+) Chronic midline low back pain      PULMONARY   (+) LOUIE (obstructive sleep apnea)        Physical Exam    Airway    Mallampati score: II  TM Distance: >3 FB  Neck ROM: full     Dental   No notable dental hx     Cardiovascular  Cardiovascular exam normal    Pulmonary  Pulmonary exam normal     Other Findings        Anesthesia Plan  ASA Score- 3     Anesthesia Type- spinal with ASA Monitors.         Additional Monitors:     Airway Plan: ETT.           Plan Factors-Exercise tolerance (METS): >4 METS.    Chart reviewed.   Existing labs reviewed.     Patient is not a current smoker. Patient not instructed to abstain from smoking on day of procedure. Patient did not smoke on day of surgery.            Induction- intravenous.    Postoperative Plan-         Informed Consent- Anesthetic plan and risks discussed with patient.  I personally reviewed this patient with the CRNA. Discussed and agreed on the Anesthesia Plan with the CRNA..      NPO Status:  No vitals data found for the desired time range.

## 2025-02-04 ENCOUNTER — HOSPITAL ENCOUNTER (OUTPATIENT)
Facility: HOSPITAL | Age: 60
Setting detail: OUTPATIENT SURGERY
Discharge: HOME | End: 2025-02-04
Attending: ORTHOPAEDIC SURGERY | Admitting: ORTHOPAEDIC SURGERY
Payer: COMMERCIAL

## 2025-02-04 ENCOUNTER — ANESTHESIA (OUTPATIENT)
Dept: PERIOP | Facility: HOSPITAL | Age: 60
End: 2025-02-04
Payer: COMMERCIAL

## 2025-02-04 ENCOUNTER — APPOINTMENT (OUTPATIENT)
Dept: RADIOLOGY | Facility: HOSPITAL | Age: 60
End: 2025-02-04
Payer: COMMERCIAL

## 2025-02-04 VITALS
DIASTOLIC BLOOD PRESSURE: 58 MMHG | TEMPERATURE: 98 F | WEIGHT: 193.6 LBS | RESPIRATION RATE: 17 BRPM | SYSTOLIC BLOOD PRESSURE: 120 MMHG | OXYGEN SATURATION: 98 % | HEART RATE: 51 BPM | BODY MASS INDEX: 28.68 KG/M2 | HEIGHT: 69 IN

## 2025-02-04 DIAGNOSIS — M16.12 PRIMARY OSTEOARTHRITIS OF ONE HIP, LEFT: Primary | ICD-10-CM

## 2025-02-04 DIAGNOSIS — M25.552 PAIN IN LEFT HIP: ICD-10-CM

## 2025-02-04 DIAGNOSIS — K44.9 SLIDING HIATAL HERNIA: ICD-10-CM

## 2025-02-04 PROBLEM — Z86.73: Status: ACTIVE | Noted: 2024-02-07

## 2025-02-04 PROBLEM — Z95.828 PRESENCE OF INTERNAL CAROTID STENT: Status: ACTIVE | Noted: 2024-04-22

## 2025-02-04 PROBLEM — R39.12 BENIGN PROSTATIC HYPERPLASIA WITH WEAK URINARY STREAM: Status: ACTIVE | Noted: 2024-04-02

## 2025-02-04 PROBLEM — N40.1 BENIGN PROSTATIC HYPERPLASIA WITH WEAK URINARY STREAM: Status: ACTIVE | Noted: 2024-04-02

## 2025-02-04 PROBLEM — M19.90 OSTEOARTHRITIS: Status: ACTIVE | Noted: 2024-01-14

## 2025-02-04 PROBLEM — R00.1 BRADYCARDIA: Status: ACTIVE | Noted: 2018-10-05

## 2025-02-04 PROBLEM — I69.30 HISTORY OF CVA WITH RESIDUAL DEFICIT: Status: ACTIVE | Noted: 2024-02-13

## 2025-02-04 PROCEDURE — C1776 JOINT DEVICE (IMPLANTABLE): HCPCS | Performed by: ORTHOPAEDIC SURGERY

## 2025-02-04 PROCEDURE — C1713 ANCHOR/SCREW BN/BN,TIS/BN: HCPCS | Performed by: ORTHOPAEDIC SURGERY

## 2025-02-04 PROCEDURE — 97163 PT EVAL HIGH COMPLEX 45 MIN: CPT

## 2025-02-04 PROCEDURE — 86923 COMPATIBILITY TEST ELECTRIC: CPT

## 2025-02-04 PROCEDURE — 27130 TOTAL HIP ARTHROPLASTY: CPT | Performed by: ORTHOPAEDIC SURGERY

## 2025-02-04 PROCEDURE — 27130 TOTAL HIP ARTHROPLASTY: CPT | Performed by: PHYSICIAN ASSISTANT

## 2025-02-04 PROCEDURE — 0054T BONE SRGRY CMPTR FLUOR IMAGE: CPT | Performed by: ORTHOPAEDIC SURGERY

## 2025-02-04 PROCEDURE — 73501 X-RAY EXAM HIP UNI 1 VIEW: CPT

## 2025-02-04 PROCEDURE — 73502 X-RAY EXAM HIP UNI 2-3 VIEWS: CPT

## 2025-02-04 DEVICE — PINNACLE HIP SOLUTIONS ALTRX POLYETHYLENE ACETABULAR LINER NEUTRAL 36MM ID 54MM OD
Type: IMPLANTABLE DEVICE | Site: HIP | Status: FUNCTIONAL
Brand: PINNACLE ALTRX

## 2025-02-04 DEVICE — PINNACLE CANCELLOUS BONE SCREW 6.5MM X 20MM
Type: IMPLANTABLE DEVICE | Site: HIP | Status: FUNCTIONAL
Brand: PINNACLE

## 2025-02-04 DEVICE — ACTIS DUOFIX HIP PROSTHESIS (FEMORAL STEM 12/14 TAPER CEMENTLESS SIZE 9 HIGH COLLAR)  CE
Type: IMPLANTABLE DEVICE | Site: HIP | Status: FUNCTIONAL
Brand: ACTIS

## 2025-02-04 DEVICE — PINNACLE CANCELLOUS BONE SCREW 6.5MM X 15MM
Type: IMPLANTABLE DEVICE | Site: HIP | Status: FUNCTIONAL
Brand: PINNACLE

## 2025-02-04 DEVICE — BIOLOX DELTA CERAMIC FEMORAL HEAD +1.5 36MM DIA 12/14 TAPER
Type: IMPLANTABLE DEVICE | Status: FUNCTIONAL
Brand: BIOLOX DELTA

## 2025-02-04 DEVICE — PINNACLE POROCOAT ACETABULAR SHELL SECTOR II 54MM OD
Type: IMPLANTABLE DEVICE | Site: HIP | Status: FUNCTIONAL
Brand: PINNACLE POROCOAT

## 2025-02-04 RX ORDER — DOCUSATE SODIUM 100 MG/1
100 CAPSULE, LIQUID FILLED ORAL 2 TIMES DAILY
Status: DISCONTINUED | OUTPATIENT
Start: 2025-02-04 | End: 2025-02-04 | Stop reason: HOSPADM

## 2025-02-04 RX ORDER — FENTANYL CITRATE/PF 50 MCG/ML
50 SYRINGE (ML) INJECTION
Status: DISCONTINUED | OUTPATIENT
Start: 2025-02-04 | End: 2025-02-04 | Stop reason: HOSPADM

## 2025-02-04 RX ORDER — LABETALOL HYDROCHLORIDE 5 MG/ML
INJECTION, SOLUTION INTRAVENOUS AS NEEDED
Status: DISCONTINUED | OUTPATIENT
Start: 2025-02-04 | End: 2025-02-04

## 2025-02-04 RX ORDER — ACETAMINOPHEN 325 MG/1
975 TABLET ORAL EVERY 8 HOURS
Status: DISCONTINUED | OUTPATIENT
Start: 2025-02-04 | End: 2025-02-04 | Stop reason: HOSPADM

## 2025-02-04 RX ORDER — GABAPENTIN 100 MG/1
100 CAPSULE ORAL 3 TIMES DAILY
Qty: 90 CAPSULE | Refills: 0 | Status: SHIPPED | OUTPATIENT
Start: 2025-02-04

## 2025-02-04 RX ORDER — DOCUSATE SODIUM 100 MG/1
100 CAPSULE, LIQUID FILLED ORAL 2 TIMES DAILY
Qty: 30 CAPSULE | Refills: 0 | Status: SHIPPED | OUTPATIENT
Start: 2025-02-04

## 2025-02-04 RX ORDER — FENTANYL CITRATE 50 UG/ML
INJECTION, SOLUTION INTRAMUSCULAR; INTRAVENOUS AS NEEDED
Status: DISCONTINUED | OUTPATIENT
Start: 2025-02-04 | End: 2025-02-04

## 2025-02-04 RX ORDER — ONDANSETRON 2 MG/ML
INJECTION INTRAMUSCULAR; INTRAVENOUS AS NEEDED
Status: DISCONTINUED | OUTPATIENT
Start: 2025-02-04 | End: 2025-02-04

## 2025-02-04 RX ORDER — FERROUS SULFATE 325(65) MG
325 TABLET ORAL 2 TIMES DAILY WITH MEALS
Status: DISCONTINUED | OUTPATIENT
Start: 2025-02-04 | End: 2025-02-04 | Stop reason: HOSPADM

## 2025-02-04 RX ORDER — ACETAMINOPHEN 325 MG/1
975 TABLET ORAL ONCE
Status: COMPLETED | OUTPATIENT
Start: 2025-02-04 | End: 2025-02-04

## 2025-02-04 RX ORDER — CALCIUM CARBONATE 500 MG/1
1000 TABLET, CHEWABLE ORAL DAILY PRN
Status: DISCONTINUED | OUTPATIENT
Start: 2025-02-04 | End: 2025-02-04 | Stop reason: HOSPADM

## 2025-02-04 RX ORDER — SODIUM CHLORIDE 9 MG/ML
75 INJECTION, SOLUTION INTRAVENOUS CONTINUOUS
Status: DISCONTINUED | OUTPATIENT
Start: 2025-02-04 | End: 2025-02-04 | Stop reason: HOSPADM

## 2025-02-04 RX ORDER — PANTOPRAZOLE SODIUM 40 MG/1
40 TABLET, DELAYED RELEASE ORAL DAILY
Status: DISCONTINUED | OUTPATIENT
Start: 2025-02-04 | End: 2025-02-04 | Stop reason: HOSPADM

## 2025-02-04 RX ORDER — GABAPENTIN 300 MG/1
300 CAPSULE ORAL ONCE
Status: COMPLETED | OUTPATIENT
Start: 2025-02-04 | End: 2025-02-04

## 2025-02-04 RX ORDER — OXYCODONE HYDROCHLORIDE 5 MG/1
10 TABLET ORAL EVERY 4 HOURS PRN
Qty: 42 TABLET | Refills: 0 | Status: SHIPPED | OUTPATIENT
Start: 2025-02-04 | End: 2025-02-04

## 2025-02-04 RX ORDER — SENNOSIDES 8.6 MG
1 TABLET ORAL DAILY
Status: DISCONTINUED | OUTPATIENT
Start: 2025-02-04 | End: 2025-02-04 | Stop reason: HOSPADM

## 2025-02-04 RX ORDER — CEFADROXIL 500 MG/1
500 CAPSULE ORAL EVERY 12 HOURS SCHEDULED
Qty: 14 CAPSULE | Refills: 0 | Status: SHIPPED | OUTPATIENT
Start: 2025-02-04 | End: 2025-02-11

## 2025-02-04 RX ORDER — PROPOFOL 10 MG/ML
INJECTION, EMULSION INTRAVENOUS CONTINUOUS PRN
Status: DISCONTINUED | OUTPATIENT
Start: 2025-02-04 | End: 2025-02-04

## 2025-02-04 RX ORDER — ALBUMIN HUMAN 50 G/1000ML
SOLUTION INTRAVENOUS CONTINUOUS PRN
Status: DISCONTINUED | OUTPATIENT
Start: 2025-02-04 | End: 2025-02-04

## 2025-02-04 RX ORDER — CEFAZOLIN SODIUM 2 G/50ML
2000 SOLUTION INTRAVENOUS ONCE
Status: COMPLETED | OUTPATIENT
Start: 2025-02-04 | End: 2025-02-04

## 2025-02-04 RX ORDER — GLYCOPYRROLATE 0.2 MG/ML
INJECTION INTRAMUSCULAR; INTRAVENOUS AS NEEDED
Status: DISCONTINUED | OUTPATIENT
Start: 2025-02-04 | End: 2025-02-04

## 2025-02-04 RX ORDER — SIMETHICONE 80 MG
80 TABLET,CHEWABLE ORAL 4 TIMES DAILY PRN
Status: DISCONTINUED | OUTPATIENT
Start: 2025-02-04 | End: 2025-02-04 | Stop reason: HOSPADM

## 2025-02-04 RX ORDER — PROPOFOL 10 MG/ML
INJECTION, EMULSION INTRAVENOUS AS NEEDED
Status: DISCONTINUED | OUTPATIENT
Start: 2025-02-04 | End: 2025-02-04

## 2025-02-04 RX ORDER — ACETAMINOPHEN 500 MG
1000 TABLET ORAL EVERY 8 HOURS
Qty: 90 TABLET | Refills: 0 | Status: SHIPPED | OUTPATIENT
Start: 2025-02-04

## 2025-02-04 RX ORDER — LIDOCAINE HYDROCHLORIDE 10 MG/ML
INJECTION, SOLUTION EPIDURAL; INFILTRATION; INTRACAUDAL; PERINEURAL AS NEEDED
Status: DISCONTINUED | OUTPATIENT
Start: 2025-02-04 | End: 2025-02-04

## 2025-02-04 RX ORDER — CHLORHEXIDINE GLUCONATE 40 MG/ML
SOLUTION TOPICAL DAILY PRN
Status: DISCONTINUED | OUTPATIENT
Start: 2025-02-04 | End: 2025-02-04 | Stop reason: HOSPADM

## 2025-02-04 RX ORDER — OXYCODONE HYDROCHLORIDE 5 MG/1
5 TABLET ORAL EVERY 4 HOURS PRN
Qty: 42 TABLET | Refills: 0 | Status: SHIPPED | OUTPATIENT
Start: 2025-02-04 | End: 2025-02-11

## 2025-02-04 RX ORDER — CHLORHEXIDINE GLUCONATE ORAL RINSE 1.2 MG/ML
15 SOLUTION DENTAL ONCE
Status: COMPLETED | OUTPATIENT
Start: 2025-02-04 | End: 2025-02-04

## 2025-02-04 RX ORDER — OXYCODONE HYDROCHLORIDE 10 MG/1
10 TABLET ORAL EVERY 4 HOURS PRN
Status: DISCONTINUED | OUTPATIENT
Start: 2025-02-04 | End: 2025-02-04 | Stop reason: HOSPADM

## 2025-02-04 RX ORDER — HYDROMORPHONE HCL/PF 1 MG/ML
0.5 SYRINGE (ML) INJECTION
Status: DISCONTINUED | OUTPATIENT
Start: 2025-02-04 | End: 2025-02-04 | Stop reason: HOSPADM

## 2025-02-04 RX ORDER — MIDAZOLAM HYDROCHLORIDE 2 MG/2ML
INJECTION, SOLUTION INTRAMUSCULAR; INTRAVENOUS AS NEEDED
Status: DISCONTINUED | OUTPATIENT
Start: 2025-02-04 | End: 2025-02-04

## 2025-02-04 RX ORDER — PROMETHAZINE HYDROCHLORIDE 12.5 MG/1
12.5 TABLET ORAL EVERY 6 HOURS PRN
Qty: 12 TABLET | Refills: 0 | Status: SHIPPED | OUTPATIENT
Start: 2025-02-04

## 2025-02-04 RX ORDER — DEXAMETHASONE SODIUM PHOSPHATE 10 MG/ML
INJECTION, SOLUTION INTRAMUSCULAR; INTRAVENOUS AS NEEDED
Status: DISCONTINUED | OUTPATIENT
Start: 2025-02-04 | End: 2025-02-04

## 2025-02-04 RX ORDER — MAGNESIUM HYDROXIDE/ALUMINUM HYDROXICE/SIMETHICONE 120; 1200; 1200 MG/30ML; MG/30ML; MG/30ML
30 SUSPENSION ORAL EVERY 6 HOURS PRN
Status: DISCONTINUED | OUTPATIENT
Start: 2025-02-04 | End: 2025-02-04 | Stop reason: HOSPADM

## 2025-02-04 RX ORDER — GABAPENTIN 100 MG/1
100 CAPSULE ORAL EVERY 8 HOURS
Status: DISCONTINUED | OUTPATIENT
Start: 2025-02-04 | End: 2025-02-04 | Stop reason: HOSPADM

## 2025-02-04 RX ORDER — OXYCODONE HYDROCHLORIDE 5 MG/1
5 TABLET ORAL EVERY 4 HOURS PRN
Status: DISCONTINUED | OUTPATIENT
Start: 2025-02-04 | End: 2025-02-04 | Stop reason: HOSPADM

## 2025-02-04 RX ORDER — ASPIRIN 81 MG/1
81 TABLET ORAL EVERY 12 HOURS
Qty: 84 TABLET | Refills: 0 | Status: SHIPPED | OUTPATIENT
Start: 2025-02-04 | End: 2025-03-18

## 2025-02-04 RX ORDER — ONDANSETRON 2 MG/ML
4 INJECTION INTRAMUSCULAR; INTRAVENOUS ONCE AS NEEDED
Status: DISCONTINUED | OUTPATIENT
Start: 2025-02-04 | End: 2025-02-04 | Stop reason: HOSPADM

## 2025-02-04 RX ORDER — ASCORBIC ACID 500 MG
500 TABLET ORAL 2 TIMES DAILY
Status: DISCONTINUED | OUTPATIENT
Start: 2025-02-04 | End: 2025-02-04 | Stop reason: HOSPADM

## 2025-02-04 RX ORDER — AMLODIPINE BESYLATE 10 MG/1
10 TABLET ORAL DAILY
Status: CANCELLED | OUTPATIENT
Start: 2025-02-05

## 2025-02-04 RX ORDER — CEFAZOLIN SODIUM 2 G/50ML
2000 SOLUTION INTRAVENOUS EVERY 8 HOURS
Status: DISCONTINUED | OUTPATIENT
Start: 2025-02-04 | End: 2025-02-04 | Stop reason: HOSPADM

## 2025-02-04 RX ORDER — ATORVASTATIN CALCIUM 80 MG/1
80 TABLET, FILM COATED ORAL DAILY
Status: CANCELLED | OUTPATIENT
Start: 2025-02-04

## 2025-02-04 RX ORDER — EPHEDRINE SULFATE 50 MG/ML
INJECTION INTRAVENOUS AS NEEDED
Status: DISCONTINUED | OUTPATIENT
Start: 2025-02-04 | End: 2025-02-04

## 2025-02-04 RX ORDER — TRANEXAMIC ACID 10 MG/ML
1000 INJECTION, SOLUTION INTRAVENOUS ONCE
Status: COMPLETED | OUTPATIENT
Start: 2025-02-04 | End: 2025-02-04

## 2025-02-04 RX ORDER — HYDROMORPHONE HCL/PF 1 MG/ML
0.5 SYRINGE (ML) INJECTION EVERY 2 HOUR PRN
Status: DISCONTINUED | OUTPATIENT
Start: 2025-02-04 | End: 2025-02-04 | Stop reason: HOSPADM

## 2025-02-04 RX ORDER — SODIUM CHLORIDE, SODIUM LACTATE, POTASSIUM CHLORIDE, CALCIUM CHLORIDE 600; 310; 30; 20 MG/100ML; MG/100ML; MG/100ML; MG/100ML
100 INJECTION, SOLUTION INTRAVENOUS CONTINUOUS
Status: DISCONTINUED | OUTPATIENT
Start: 2025-02-04 | End: 2025-02-04 | Stop reason: HOSPADM

## 2025-02-04 RX ORDER — SUCCINYLCHOLINE/SOD CL,ISO/PF 100 MG/5ML
SYRINGE (ML) INTRAVENOUS AS NEEDED
Status: DISCONTINUED | OUTPATIENT
Start: 2025-02-04 | End: 2025-02-04

## 2025-02-04 RX ORDER — HYDROMORPHONE HCL/PF 1 MG/ML
SYRINGE (ML) INJECTION AS NEEDED
Status: DISCONTINUED | OUTPATIENT
Start: 2025-02-04 | End: 2025-02-04

## 2025-02-04 RX ORDER — FOLIC ACID 1 MG/1
1 TABLET ORAL DAILY
Status: DISCONTINUED | OUTPATIENT
Start: 2025-02-04 | End: 2025-02-04 | Stop reason: HOSPADM

## 2025-02-04 RX ORDER — MAGNESIUM HYDROXIDE 1200 MG/15ML
LIQUID ORAL AS NEEDED
Status: DISCONTINUED | OUTPATIENT
Start: 2025-02-04 | End: 2025-02-04 | Stop reason: HOSPADM

## 2025-02-04 RX ORDER — ONDANSETRON 2 MG/ML
4 INJECTION INTRAMUSCULAR; INTRAVENOUS EVERY 6 HOURS PRN
Status: DISCONTINUED | OUTPATIENT
Start: 2025-02-04 | End: 2025-02-04 | Stop reason: HOSPADM

## 2025-02-04 RX ORDER — SENNOSIDES 8.6 MG
8.8 TABLET ORAL
Status: DISCONTINUED | OUTPATIENT
Start: 2025-02-04 | End: 2025-02-04 | Stop reason: HOSPADM

## 2025-02-04 RX ORDER — SODIUM CHLORIDE, SODIUM LACTATE, POTASSIUM CHLORIDE, CALCIUM CHLORIDE 600; 310; 30; 20 MG/100ML; MG/100ML; MG/100ML; MG/100ML
125 INJECTION, SOLUTION INTRAVENOUS CONTINUOUS
Status: DISCONTINUED | OUTPATIENT
Start: 2025-02-04 | End: 2025-02-04 | Stop reason: HOSPADM

## 2025-02-04 RX ORDER — ENOXAPARIN SODIUM 100 MG/ML
40 INJECTION SUBCUTANEOUS DAILY
Status: DISCONTINUED | OUTPATIENT
Start: 2025-02-04 | End: 2025-02-04 | Stop reason: HOSPADM

## 2025-02-04 RX ADMIN — CEFAZOLIN SODIUM 2000 MG: 2 SOLUTION INTRAVENOUS at 07:58

## 2025-02-04 RX ADMIN — PROPOFOL 50 MG: 10 INJECTION, EMULSION INTRAVENOUS at 10:03

## 2025-02-04 RX ADMIN — DEXAMETHASONE SODIUM PHOSPHATE 10 MG: 10 INJECTION, SOLUTION INTRAMUSCULAR; INTRAVENOUS at 09:18

## 2025-02-04 RX ADMIN — EPHEDRINE SULFATE 2.5 MG: 50 INJECTION INTRAVENOUS at 10:43

## 2025-02-04 RX ADMIN — GABAPENTIN 300 MG: 300 CAPSULE ORAL at 05:51

## 2025-02-04 RX ADMIN — DEXMEDETOMIDINE HYDROCHLORIDE 4 MCG: 100 INJECTION, SOLUTION INTRAVENOUS at 08:39

## 2025-02-04 RX ADMIN — EPHEDRINE SULFATE 5 MG: 50 INJECTION INTRAVENOUS at 08:29

## 2025-02-04 RX ADMIN — ACETAMINOPHEN 975 MG: 325 TABLET, FILM COATED ORAL at 05:51

## 2025-02-04 RX ADMIN — FENTANYL CITRATE 100 MCG: 50 INJECTION, SOLUTION INTRAMUSCULAR; INTRAVENOUS at 10:03

## 2025-02-04 RX ADMIN — GLYCOPYRROLATE 0.1 MCG: 0.2 INJECTION, SOLUTION INTRAMUSCULAR; INTRAVENOUS at 08:16

## 2025-02-04 RX ADMIN — EPHEDRINE SULFATE 2.5 MG: 50 INJECTION INTRAVENOUS at 10:33

## 2025-02-04 RX ADMIN — FENTANYL CITRATE 50 MCG: 50 INJECTION, SOLUTION INTRAMUSCULAR; INTRAVENOUS at 08:36

## 2025-02-04 RX ADMIN — EPHEDRINE SULFATE 2.5 MG: 50 INJECTION INTRAVENOUS at 10:45

## 2025-02-04 RX ADMIN — SUGAMMADEX 200 MG: 100 INJECTION, SOLUTION INTRAVENOUS at 11:08

## 2025-02-04 RX ADMIN — PROPOFOL 50 MG: 10 INJECTION, EMULSION INTRAVENOUS at 09:53

## 2025-02-04 RX ADMIN — EPHEDRINE SULFATE 5 MG: 50 INJECTION INTRAVENOUS at 10:23

## 2025-02-04 RX ADMIN — Medication 100 MG: at 08:51

## 2025-02-04 RX ADMIN — CHLORHEXIDINE GLUCONATE 0.12% ORAL RINSE 15 ML: 1.2 LIQUID ORAL at 05:52

## 2025-02-04 RX ADMIN — EPHEDRINE SULFATE 2.5 MG: 50 INJECTION INTRAVENOUS at 10:36

## 2025-02-04 RX ADMIN — PROPOFOL 75 MCG/KG/MIN: 10 INJECTION, EMULSION INTRAVENOUS at 07:53

## 2025-02-04 RX ADMIN — EPHEDRINE SULFATE 2.5 MG: 50 INJECTION INTRAVENOUS at 10:53

## 2025-02-04 RX ADMIN — FENTANYL CITRATE 50 MCG: 50 INJECTION, SOLUTION INTRAMUSCULAR; INTRAVENOUS at 07:49

## 2025-02-04 RX ADMIN — ONDANSETRON 4 MG: 2 INJECTION INTRAMUSCULAR; INTRAVENOUS at 10:48

## 2025-02-04 RX ADMIN — PROPOFOL 50 MG: 10 INJECTION, EMULSION INTRAVENOUS at 08:12

## 2025-02-04 RX ADMIN — EPHEDRINE SULFATE 5 MG: 50 INJECTION INTRAVENOUS at 10:08

## 2025-02-04 RX ADMIN — EPHEDRINE SULFATE 5 MG: 50 INJECTION INTRAVENOUS at 10:41

## 2025-02-04 RX ADMIN — SODIUM CHLORIDE, SODIUM LACTATE, POTASSIUM CHLORIDE, AND CALCIUM CHLORIDE 125 ML/HR: .6; .31; .03; .02 INJECTION, SOLUTION INTRAVENOUS at 06:02

## 2025-02-04 RX ADMIN — MIDAZOLAM 2 MG: 1 INJECTION INTRAMUSCULAR; INTRAVENOUS at 07:40

## 2025-02-04 RX ADMIN — PROPOFOL 50 MG: 10 INJECTION, EMULSION INTRAVENOUS at 08:37

## 2025-02-04 RX ADMIN — HYDROMORPHONE HYDROCHLORIDE 1 MG: 1 INJECTION, SOLUTION INTRAMUSCULAR; INTRAVENOUS; SUBCUTANEOUS at 10:03

## 2025-02-04 RX ADMIN — GLYCOPYRROLATE 2 MCG: 0.2 INJECTION, SOLUTION INTRAMUSCULAR; INTRAVENOUS at 07:57

## 2025-02-04 RX ADMIN — EPHEDRINE SULFATE 5 MG: 50 INJECTION INTRAVENOUS at 09:21

## 2025-02-04 RX ADMIN — ACETAMINOPHEN 975 MG: 325 TABLET, FILM COATED ORAL at 14:05

## 2025-02-04 RX ADMIN — TRANEXAMIC ACID 1000 MG: 10 INJECTION, SOLUTION INTRAVENOUS at 08:06

## 2025-02-04 RX ADMIN — EPHEDRINE SULFATE 2.5 MG: 50 INJECTION INTRAVENOUS at 10:48

## 2025-02-04 RX ADMIN — EPHEDRINE SULFATE 7.5 MG: 50 INJECTION INTRAVENOUS at 10:38

## 2025-02-04 RX ADMIN — LIDOCAINE HYDROCHLORIDE 60 MG: 10 INJECTION, SOLUTION EPIDURAL; INFILTRATION; INTRACAUDAL; PERINEURAL at 07:57

## 2025-02-04 RX ADMIN — DEXMEDETOMIDINE HYDROCHLORIDE 4 MCG: 100 INJECTION, SOLUTION INTRAVENOUS at 08:36

## 2025-02-04 RX ADMIN — LABETALOL HYDROCHLORIDE 5 MG: 5 INJECTION, SOLUTION INTRAVENOUS at 09:08

## 2025-02-04 RX ADMIN — EPHEDRINE SULFATE 5 MG: 50 INJECTION INTRAVENOUS at 09:23

## 2025-02-04 RX ADMIN — EPHEDRINE SULFATE 2.5 MG: 50 INJECTION INTRAVENOUS at 10:26

## 2025-02-04 RX ADMIN — ALBUMIN (HUMAN): 12.5 INJECTION, SOLUTION INTRAVENOUS at 10:22

## 2025-02-04 RX ADMIN — DEXMEDETOMIDINE HYDROCHLORIDE 4 MCG: 100 INJECTION, SOLUTION INTRAVENOUS at 08:42

## 2025-02-04 RX ADMIN — PROPOFOL 100 MG: 10 INJECTION, EMULSION INTRAVENOUS at 08:53

## 2025-02-04 NOTE — NURSING NOTE
PT in with patient now. IV hep-locked. Reporting continuing numbness to both legs and buttocks. Walking with therapy and rolling walker. TEDs and non-skid socks to bilateral lower extremities. Ambulating up and down hallway, tolerating well. No reports of pain. Dressing remains intact. Tolerating ambulation well.

## 2025-02-04 NOTE — NURSING NOTE
Patient was cleared by PT for discharge. Patient voided successfully, IV removed. Assistance provided to get dressed. AVS printed and reviewed with patient, verbalized understanding.

## 2025-02-04 NOTE — PHYSICAL THERAPY NOTE
Physical Therapy Evaluation    Patient's Name: Neptali Elias    Admitting Diagnosis  Primary osteoarthritis of one hip, left [M16.12]    Problem List  Patient Active Problem List   Diagnosis    Essential hypertension    LOUIE (obstructive sleep apnea)    Chronic midline low back pain    Class 2 obesity in adult    Mixed hyperlipidemia    Gastroesophageal reflux disease    Difficulty using continuous positive airway pressure (CPAP) device    Primary osteoarthritis of left hip    Mood changes    Nocturia    Extremity numbness    Fatigue    Ulnar neuropathy of both upper extremities    Bilateral carpal tunnel syndrome    Swelling of right hand    Calcification of right carotid artery    Pain in left hip    Sliding hiatal hernia    External hemorrhoids    Diverticulosis    BMI 32.0-32.9,adult    Allergies    Encounter for support and coordination of transition of care    Cerebrovascular accident (CVA) (HCC)    Tobacco use disorder    Arterial ischemic stroke, MCA (middle cerebral artery), right, chronic    Benign prostatic hyperplasia with weak urinary stream    Bradycardia    H/O laminectomy    History of CVA with residual deficit    Osteoarthritis    Pseudofolliculitis    Presence of internal carotid stent       Past Medical History  Past Medical History:   Diagnosis Date    Colon polyp     GERD (gastroesophageal reflux disease)     Hyperlipidemia     Hypertension     Sleep apnea        Past Surgical History  Past Surgical History:   Procedure Laterality Date    ACHILLES TENDON REPAIR Right     1983 in [place]    BACK SURGERY  2009    herniated L5-S1 disc repair    COLONOSCOPY         Recent Imaging  XR hip/pelv 1 vw left if performed   Final Result by Marcio Varma MD (02/04 1234)      Expected postoperative appearance of total hip arthroplasty.                     Workstation performed: [id number]         XR hip/pelv 2-3 vws left if performed   Final Result by Marcio Varma MD (02/04 124)       Fluoroscopy provided for procedure guidance.      Please refer to the separate procedure note for additional details.                  Workstation performed: [id number]             Recent Vital Signs  Vitals:    02/04/25 1225 02/04/25 1332 02/04/25 1430 02/04/25 1533   BP: 117/74 120/71 131/70 120/58   BP Location: Left arm   Left arm   Pulse: (!) 52 (!) 50 (!) 53 (!) 51   Resp: 15 18 16 17   Temp: (!) 96 °F (35.6 °C) (!) 96.1 °F (35.6 °C) (!) 96.6 °F (35.9 °C) 98 °F (36.7 °C)   TempSrc: Temporal Temporal Temporal Temporal   SpO2: 98% 99% 98%    Weight:       Height:            02/04/25 1630   PT Last Visit   PT Visit Date 02/04/25   Note Type   Note type Evaluation   Pain Assessment   Pain Assessment Tool 0-10   Pain Score 2   Pain Location/Orientation Orientation: Left;Location: Hip   Restrictions/Precautions   Weight Bearing Precautions Per Order No   Other Precautions Fall Risk;Pain   Home Living   Type of Home House   Home Layout One level   Bathroom Shower/Tub Walk-in shower   Bathroom Toilet Standard   Bathroom Accessibility Accessible via walker   Prior Function   Level of [place] Independent with ADLs;Independent with functional mobility   Lives With Spouse   Receives Help From Family   Falls in the last 6 months 0   General   Family/Caregiver Present No   Cognition   Overall Cognitive Status WFL   Arousal/Participation Alert   Orientation Level Oriented X4   Memory Within functional limits   Following Commands Follows all commands and directions without difficulty   RLE Assessment   RLE Assessment WFL   LLE Assessment   LLE Assessment   (4/5)   Coordination   Movements are Fluid and Coordinated 0   Coordination and Movement Description decreased LLE coordination post op   Sensation WFL   Light Touch   RLE Light Touch Grossly intact   LLE Light Touch Grossly intact   Bed Mobility   Supine to Sit 6  Modified independent   Additional items Increased time required   Sit to Supine 6  Modified independent    Additional items Increased time required   Transfers   Sit to Stand 6  Modified independent   Additional items Increased time required   Stand to Sit 6  Modified independent   Additional items Increased time required   Ambulation/Elevation   Gait pattern Step through pattern;Decreased toe off;Decreased heel strike;Excessively slow;Short stride;Decreased foot clearance   Gait Assistance 6  Modified independent   Additional items Verbal cues   Assistive Device Rolling walker   Distance 250ft, 120ft   Stair Management Assistance 6  Modified independent   Additional items Verbal cues;Increased time required   Stair Management Technique Step to pattern;Two rails;Foreward   Number of Stairs 8   Balance   Static Sitting Fair +   Dynamic Sitting Fair +   Static Standing Fair   Dynamic Standing Fair -   Ambulatory Fair -   Endurance Deficit   Endurance Deficit Yes   Endurance Deficit Description post op fatigue   Activity Tolerance   Activity Tolerance Patient tolerated treatment well   Medical Staff Made Aware spoke to CM   Nurse Made Aware spoke to RN   Assessment   Prognosis Good   Problem List Decreased strength;Decreased range of motion;Decreased endurance;Impaired balance;Decreased mobility;Decreased coordination   Barriers to Discharge Inaccessible home environment;Decreased caregiver support   Goals   Patient Goals to return home   Discharge Recommendation   Rehab Resource Intensity Level, PT III (Minimum Resource Intensity)   Equipment Recommended Walker   Walker Package Recommended Wheeled walker   AM-PAC Basic Mobility Inpatient   Turning in Flat Bed Without Bedrails 4   Lying on Back to Sitting on Edge of Flat Bed Without Bedrails 4   Moving Bed to Chair 4   Standing Up From Chair Using Arms 4   Walk in Room 4   Climb 3-5 Stairs With Railing 4   Basic Mobility Inpatient Raw Score 24   Basic Mobility Standardized Score 57.68   [facility] Level Of Mobility   -[facility] Goal 8: Walk 250 feet or more   -[facility]  Achieved 8: Walk 250 feet ot more   End of Consult   Patient Position at End of Consult Bedside chair;All needs within reach         ASSESSMENT                                                                                                                     Neptali Elias is a 59 y.o. male admitted to [facility] on 2/4/2025 for Primary osteoarthritis of left hip. Pt  has a past medical history of Colon polyp, GERD (gastroesophageal reflux disease), Hyperlipidemia, Hypertension, and Sleep apnea.. PT was consulted and pt was seen on 2/4/2025 for mobility assessment and d/c planning.   Pt presents supine in bed alert and agreeable to therapy. He is reporting little to no pain. Tolerating ambulation and stairs well. Cleared for D/C at this time.     Impairments limiting pt at this time include decreased ROM, impaired balance, decreased endurance, decreased coordination, and decreased strength. Pt is currently functioning at a modified independent assistance level for bed mobility, modified independent assistance level for transfers, modified independent assistance level for ambulation with Rolling Walker, and modified independent assistance for elevations. The patient's AM-PAC Basic Mobility Inpatient Short Form Raw Score is 24. A Raw score of greater than 16 suggests the patient may benefit from discharge to home. Please also refer to the recommendation of the Physical Therapist for safe discharge planning.    Recommendations                                                                                                                DME: Rolling Walker    Discharge Disposition:  Home with Outpatient Physical Therapy       Rustam Brian PT, DPT

## 2025-02-04 NOTE — ANESTHESIA PROCEDURE NOTES
Spinal Block    Patient location during procedure: OR  Start time: 2/4/2025 7:51 AM  Reason for block: procedure for pain and at surgeon's request  Staffing  Performed by: Les Ibanez CRNA  Authorized by: Les Ibanez CRNA    Preanesthetic Checklist  Completed: patient identified, IV checked, site marked, risks and benefits discussed, surgical consent, monitors and equipment checked, pre-op evaluation and timeout performed  Spinal Block  Patient position: sitting  Prep: ChloraPrep and site prepped and draped  Patient monitoring: frequent blood pressure checks, continuous pulse ox and heart rate  Approach: midline  Location: L3-4  Needle  Needle type: Pencan   Needle gauge: 24 G  Needle length: 4 in  Assessment  Sensory level: T4  Injection Assessment:  negative aspiration for heme, no paresthesia on injection and positive aspiration for clear CSF.  Post-procedure:  site cleaned  Additional Notes  1.8 ml .75% bupivicaine with epi wash sab by Luis Alberto SRNA

## 2025-02-04 NOTE — NURSING NOTE
Pt returned to [facility] awake,alert,denies pain, taking liquids, refusing solids at this time. Moving both feet well, still some numbness in the legs. Ice pack to left hip incision.

## 2025-02-04 NOTE — NURSING NOTE
Patient sitting at edge of bed with encouragement, tolerating well. Able to extend leg out on command but still reports numbness. Wife at bedside, call bell in reach. Pt tolerating food and drink without difficulty.

## 2025-02-04 NOTE — INTERVAL H&P NOTE
H&P reviewed. After examining the patient I find no changes in the patients condition since the H&P had been written.  Patient notes his last dose of ASA was Friday.  I let him know he may have increased blood loss as we prefer to be off of it 5 days.  We weighed risks and benefits.  Patient elected to move forward with surgery.    Heart:  regular rate  Lungs:  no audible wheezing  Abd:  nondistended  LLE:  EHL/AT/GS intact, sensation grossly intact L4, L5, S1, palpable pedal pulse    Vitals:    02/04/25 0544   BP: 158/93   Pulse: 59   Resp: 16   Temp: (!) 97.3 °F (36.3 °C)   SpO2: 96%

## 2025-02-04 NOTE — NURSING NOTE
Pt reporting no pain, just feeling drowsy. Leg still numb and pt unable to lift leg off of bed. Has sensation in lower extremity. VS remain baseline, call bell in reach. Lying and resting at this time.

## 2025-02-04 NOTE — ANESTHESIA POSTPROCEDURE EVALUATION
Post-Op Assessment Note    CV Status:  Stable  Pain Score: 0    Pain management: adequate       Mental Status:  Alert and awake   Hydration Status:  Stable   PONV Controlled:  None   Airway Patency:  Patent     Post Op Vitals Reviewed: Yes    No anethesia notable event occurred.    Staff: Anesthesiologist, with CRNAs           Last Filed PACU Vitals:  Vitals Value Taken Time   Temp 97.6 °F (36.4 °C) 02/04/25 1130   Pulse 56 02/04/25 1142   /59 02/04/25 1140   Resp 5 02/04/25 1142   SpO2 98 % 02/04/25 1142   Vitals shown include unfiled device data.

## 2025-02-04 NOTE — DISCHARGE INSTR - AVS FIRST PAGE
ANTERIOR TOTAL HIP REPLACEMENT DISCHARGE INSTRUCTIONS    Surgical Dressing:  You may remove your dressing 7 days from the date of your surgery then change your dressing daily until drainage stops.  Do not put any lotions or creams on your incision.  If there are any signs of infection such as drainage persisting beyond a few days, unusual looking drainage (yellow, green), increased redness around the incision, or fever/chills let your doctor know.      Medications:  Upon discharge you will be given a prescription for an anticoagulant (i.e. Ecotrin (Aspirin), Coumadin (Warfarin), Lovenox (Enoxaparin)) and a narcotic pain reliever.  Do not take any over the counter NSAIDs (i.e. Ibuprofen, Motrin, Advil, Naprosyn, Aleve) while on your anticoagulation medication.  Narcotic pain relievers cannot be refilled over the phone.  Please be mindful of the number of pills you have left so you can  a prescription for a refill if needed during office hours.        If you are on Coumadin (Warfarin) you will need your blood drawn every Monday and Thursday once you are home.  Initially your visiting nurse will draw your blood.  Later you will go to an outpatient lab.  You will receive a phone call the next day and your Coumadin (warfarin) will be dosed based on these results.  Take this dosage daily until you hear from us next.      Walking:  Use two crutches or a walker for EVERY step.  Gradually increase your walking daily.  You can progress to a cane as tolerated once advised by your physical therapist.      Sleeping Positions:  You may not sleep on your stomach.      Bathing:  No tub baths.  Do not submerge your incision.  You may shower.  You may sit on a shower chair or stand and shower briefly.  Use your crutches/walker to get in and out of the shower.      Sexual Relations:  Resume according to your comfort.    Swimming:  No swimming or hot tubs until approved by your physician.  Swimming will be allowed once your  incision is well healed.      Driving:  You may ride as a passenger now.  No driving until your follow-up appointment.  To get into the car use the front passenger seat with the seat pushed back as far as possible.  Scoot yourself back in the seat.  Use your hands to assist your legs into the car.      Physical therapy:  When you have finished with home visiting PT, you may begin outpatient PT.  Call your surgeon’s office if you have not already received a prescription.  A prescription can be faxed to the outpatient center of your choice.      Special considerations:  To minimize swelling, stiffness, and decrease pain use cold as needed, but not heat.  Ice 20 minutes at a time with a cloth between your skin and the ice.  Ice after walking, when you have pain, or after you have completed your exercises.  Limit your sitting to 60 minutes at one time.  Continue to wear your ANGELA stockings at all times for 2 weeks after surgery, except when washing.  You can wear them as needed after that.      Follow up:  Call [phone] to make an appointment to see your surgeon within 2 weeks of your surgery if you haven’t done so already.  If you have any questions during business hours please call the direct office phone number [phone].  Otherwise please call [phone] for any concerns.      For the future:  Prior to any dental work, including routine cleanings, call the office for a prescription for antibiotics to be taken prior to your dental appointment.  For any invasive procedures (i.e. endoscopy, colonoscopy, etc.) inform the doctor performing the procedure that you have a total knee replacement and will antibiotics just prior to the procedure to protect it.

## 2025-02-04 NOTE — ANESTHESIA POSTPROCEDURE EVALUATION
Post-Op Assessment Note    CV Status:  Stable  Pain Score: 0    Pain management: adequate       Mental Status:  Alert and awake   Hydration Status:  Euvolemic   PONV Controlled:  Controlled   Airway Patency:  Patent     Post Op Vitals Reviewed: Yes    No anethesia notable event occurred.    Staff: CRNA           Last Filed PACU Vitals:  Vitals Value Taken Time   Temp 97.6 °F (36.4 °C) 02/04/25 1130   Pulse 60 02/04/25 1132   /56 02/04/25 1130   Resp 8 02/04/25 1132   SpO2 93 % 02/04/25 1132   Vitals shown include unfiled device data.

## 2025-02-04 NOTE — OP NOTE
OPERATIVE REPORT  PATIENT NAME: Neptali Elias  : 1965  MRN: [id number]  Pt Location:   OR ROOM 12    Surgery Date: 2025    Surgeons and Role:     * Rosalia Higuera, DO - Primary     * Hiro Hunter PA-C - Assisting      * Miguel Angel Martinez, ATC - Assisting                                                    Preop Diagnosis:  Primary osteoarthritis of one hip, left [M16.12]    Post-Op Diagnosis Codes:     * Primary osteoarthritis of one hip, left [M16.12]    Procedure(s):  Left - ARTHROPLASTY HIP TOTAL ANTERIOR.NAVIGATED. potential same day discharge    Specimens:  * No specimens in log *    Estimated Blood Loss:   175 mL    Drains:  * No LDAs found *    Anesthesia Type:   Spinal     Operative Indications:  Primary osteoarthritis of one hip, left [M16.12]    Operative Findings:  See below    Complications:   None      Hip Approach: Direct anterior    Chronic Narcotic Use:  No      Procedure and Technique:  Implants: Depuy  [place] acetabular sector II cup size 54mm  Actis high offset stem size 9  Neutral acetabular liner  One 6.5 x 15mm screw, one 6.5 x 20mm screw  36mm +1.5 Biolox delta ceramic femoral head    INDICATIONS FOR PROCEDURE:  The patients is a 59 years old male who presented to the office with left hip arthritis.  Conservative treatments were tried and failed.  The patient had debilitating pain and decreased quality of life from their end-stage arthritis.  Surgery was then recommended.  Extensive counseling in regards to the reasons for surgical intervention as well as the risks and benefits of surgery were reviewed.  The risks include, but are not limited to infection, extensive blood clots, blood clots, wound healing problems, damage to blood vessels and nerves, dislocation, leg length discrepancy, fracture, the need for further surgery.  The patient understood and agreed to by oral and written consent.  Presurgical planning XRs were obtained.      OPERATIVE PROCEDURE:   The patient was identified as Neptali Elias by His ID bracelet by the surgical staff in the preoperative area at [facility].  The patient was wheeled back to the surgical room and placed on the operative table.  Preoperative antibiotics were given.    Anesthesia was administered.  Fluoroscopic images were obtained and saved.      The patient was placed in the supine position on the hana table and all bony prominences were carefully protected.   The left leg was then prepped and draped in the usual sterile fashion.  A timeout was performed where the patient’s name and surgical site were once again identified.        An incision was made over  the anterior aspect of the patient’s left hip.  Dissection was made through the skin and subcutaneous tissue down to the fascia over the tensor fascia ingris.  The fascia was incised and the interval between the tensor fascia ingris and the sartorius was identified.  Retractors were placed.  Bleeders were cauterized.  We dissected down to the capsule.  A capsulotomy was made and the capsule was tagged with suture.  We released the capsule from the neck.  The femoral neck cut was made and the femoral head was removed.  Retractors were placed around the acetabulum.  The labrum was removed.  Sequential reaming took place and a size 54mm acetabular shell was found to be the best fit. Fluoroscopic images were taken while reaming and to evaluate the position of the cup.  The shell was impacted into place.   Fluoroscopic images were taken and saved.  The surespot hip computer navigation system was used to evaluate the position of the cup.  One 6.5 x 15 mm screw and one 6.5 x 20mm screw was placed through the cup and the final liner was impacted into placed.  The liner was checked and found to be secure.       Attention was then paid to the femur.  The leg was manipulated for exposure of the femur proximally.  Soft tissue dissection was done to reveal the greater  trochanter.  A canal finder were used to open the femoral canal and excess bone was removed laterally.  Sequential broaching took place and it was found that a size 9 femoral broach to be the best fit.  The broach was left in place and a size 36mm +1.5 femoral head with a standard offset neck were then trialed.  Fluoroscopic images were taken and saved.  The FreshPlanet hip computer navigation system was used to evaluate leg lengths and offset.  The leg was taken through a ROM to evaluate for stability.  Stability was acceptable but there was too much length and not enough offset.  We then worked the size 9 stem down several millimeters.  We placed a 36mm +1.5 femoral head on a high offset femoral neck.  Fluoroscopic images were taken and saved.  The [employer]Planet hip computer navigation system was used to evaluate leg lengths and offset.  The leg was taken through a ROM to evaluate for stability.  Stability, leg length, and offset were found to be acceptable.  The final femoral stem was impacted into place.   The final 36mm +1.5 femoral head was impacted securely into place.  The hip was carefully reduced.  Fluoroscopic images were taken and saved.  The [employer]et hip computer navigation system was used to evaluate leg lengths and offset.  The leg was taken through a ROM to evaluate for stability.  Stability, leg length, and offset were found to be acceptable.  An irrisept wash was followed by copious amounts of irrigation.  The capsule was repaired with a #5 Ethibond suture.  Irrigation was used throughout the closure.  The tensor fascia was repaired with #1-0 running vicryl suture.  The subcutaneous fat was closed with a running #1-0 vicryl suture.  The skin was closed with #2-0 vicryl and #3-0 monocryl subcutaneously.   Skin glue was placed on the incision and a mepilex dressing was placed.  The patient was transferred onto a hospital bed and awakened without difficulty.      I attest that I was present and performed this  procedure.  Hiro Hunter PA-C  and Miguel Angel Martinez ATC were present for the entire procedure and provided essential assistance with limb position, patient prepping, and retraction.     A qualified resident physician was not available.    Patient Disposition:  hemodynamically stable              SIGNATURE: Rosalia Higuera DO  DATE: February 4, 2025  TIME: 11:14 AM

## 2025-02-05 ENCOUNTER — PATIENT OUTREACH (OUTPATIENT)
Dept: OBGYN CLINIC | Facility: HOSPITAL | Age: 60
End: 2025-02-05

## 2025-02-05 ENCOUNTER — TELEPHONE (OUTPATIENT)
Dept: OBGYN CLINIC | Facility: HOSPITAL | Age: 60
End: 2025-02-05

## 2025-02-05 LAB
ABO GROUP BLD BPU: NORMAL
ABO GROUP BLD BPU: NORMAL
BPU ID: NORMAL
BPU ID: NORMAL
CROSSMATCH: NORMAL
CROSSMATCH: NORMAL
UNIT DISPENSE STATUS: NORMAL
UNIT DISPENSE STATUS: NORMAL
UNIT PRODUCT CODE: NORMAL
UNIT PRODUCT CODE: NORMAL
UNIT PRODUCT VOLUME: 350 ML
UNIT PRODUCT VOLUME: 350 ML
UNIT RH: NORMAL
UNIT RH: NORMAL

## 2025-02-05 NOTE — PROGRESS NOTES
[facility] reviewed pt chart and pt had arthroplasty of left hip completed on 02/04/2025. Per review of chart in hospital pt indicated he lives with his spouse who will provide support. However, while working with pt preoperatively pt had indicated he lives alone and had no support. [facility] attempted to reach pt today to follow up postoperatively to inquire of pt has any caregiver support needs. I was unable to reach pt and a message was left to please return [facility] call with any needs or concerns. [facility] will remain available.

## 2025-02-06 ENCOUNTER — TELEPHONE (OUTPATIENT)
Dept: OBGYN CLINIC | Facility: HOSPITAL | Age: 60
End: 2025-02-06

## 2025-02-06 ENCOUNTER — APPOINTMENT (OUTPATIENT)
Dept: PHYSICAL THERAPY | Facility: CLINIC | Age: 60
End: 2025-02-06
Payer: COMMERCIAL

## 2025-02-06 ENCOUNTER — TELEPHONE (OUTPATIENT)
Age: 60
End: 2025-02-06

## 2025-02-06 ENCOUNTER — NURSE TRIAGE (OUTPATIENT)
Dept: OTHER | Facility: OTHER | Age: 60
End: 2025-02-06

## 2025-02-06 DIAGNOSIS — Z96.649 HISTORY OF TOTAL HIP REPLACEMENT, UNSPECIFIED LATERALITY: Primary | ICD-10-CM

## 2025-02-06 RX ORDER — CELECOXIB 200 MG/1
200 CAPSULE ORAL 2 TIMES DAILY
Qty: 60 CAPSULE | Refills: 0 | Status: SHIPPED | OUTPATIENT
Start: 2025-02-06 | End: 2025-03-08

## 2025-02-06 NOTE — TELEPHONE ENCOUNTER
"Pt called stating he had hip replacement surgery on 2/4 and went home yesterday.  Pt states last night he had a fever of 101.5 and took Acetaminophen to control temperature.  Pt is not certain if he has a fever today as he has not checked his temp with a thermometer, but states he does not feel warm like he did last night.  In addition, pt states his incision site \"is swollen like a football\" this morning and he cannot ambulate like he had been using his walker. Pt states he has been using ice, but does not feel coolness when he uses it indirectly, so he has been placing it right on his skin.  Advice offered per protocol and pt given phone number for Orthopedics so he can call office when it opens to discuss these issues further.   "

## 2025-02-06 NOTE — TELEPHONE ENCOUNTER
"Reason for Disposition   Fever > 100.4 F (38.0 C)    Answer Assessment - Initial Assessment Questions  1. SYMPTOM: \"What's the main symptom you're concerned about?\" (e.g., pain, fever, vomiting)      Swelling at incision site \"like a football\" and fever   2. ONSET: \"When did swelling and fever  start?\"      Last night   3. SURGERY: \"What surgery did you have?\"      Left hip replacement on Tuesday 4. DATE of SURGERY: \"When was the surgery?\"       Tuesday 2/4  5. ANESTHESIA: \"What type of anesthesia did you have?\" (e.g., general, spinal, epidural, local)      General   6. DRAINS: \"Were any drains place in or around the wound?\" (e.g., Hemovac, Chris-Vazquez, Penrose)      No drains   7. PAIN: \"Is there any pain?\" If Yes, ask: \"How bad is it?\"  (Scale 1-10; or mild, moderate, severe)      Taking pills regularly, so pain is controlled   8. FEVER: \"Do you have a fever?\" If Yes, ask: \"What is your temperature, how was it measured, and when did it start?\"      Temperature last night was 101.5   Took Acetaminophen   9. VOMITING: \"Is there any vomiting?\" If Yes, ask: \"How many times?\"      No vomiting   10. BLEEDING: \"Is there any bleeding?\" If Yes, ask: \"How much?\" and \"Where?\"        No bleeding   11. OTHER SYMPTOMS: \"Do you have any other symptoms?\" (e.g., drainage from wound, painful urination, constipation)        Has swelling \"like a football\" at incision site.  Is using ice as ordered.  Was able to ambulated yesterday but today cannot due to swelling.  Has not checked temp this morning but does not feel warm like he did yesterday    Protocols used: Post-Op Symptoms and Questions-Adult-    "

## 2025-02-06 NOTE — TELEPHONE ENCOUNTER
Spoke with patient on the phone.  He just has swelling in the left upper thigh area.  His temperature this morning he stated was about 100.  I did discuss with him it is normal to have an elevated temperature of several days after surgery.  He denies any shortness of breath or chest pain.  He denies any bleeding from the incision site.  He does have swelling in the upper thigh which I did say is normal.  He has no bowel or bladder complaints.  I did suggest adding a second oxycodone if he needs to once or twice during the day.  I will also call in Celebrex for him twice daily to help with the inflammation and discomfort.  I did tell him to let us know if for any reason he has any other symptoms we can always see him at the office.  He does state he feels like he may have a little sore throat, this could potentially be virus related as well as he may be coming down with a virus.

## 2025-02-06 NOTE — TELEPHONE ENCOUNTER
Caller: Neptali    Call back#: [phone]    Best call back time am/pm/all day: all day    Doctor: Dr Higuera    Surgery: yes    Date of Surgery: 2/4/25    Reason for call: Patient's leg is swollen and he has beed running a fever off and on since the SX      Urgent matters - Please Teams message Nurse Navigator Team Chat & send phone note to Orthopedic Nurse Navigator Pool as high priority before transferring call.   Non-Urgent matters - Please send a phone note to Orthopedic Nurse Navigator Pool only. Nurse Navigators will call patients back asap.

## 2025-02-06 NOTE — TELEPHONE ENCOUNTER
"Patient contacted for a postoperative follow up assessment. Patient reports doing \"my leg Is very swollen and I had a fever\" . Patient states current pain level of a  8/10  when sitting and 8/10 when walking with RW.  Patient reports significant swelling approx 4 inches above incision to thigh area and dressing is clean, dry and intact. Patient is icing the site regularly. Encouraged patient to ice every 1-2 hours for 20-30 minutes and to elevate to help with pain and swelling.     We reviewed patients AVS medication list. Patient is taking Tylenol 1000mg every 8 hours, Oxycodone 5mg PRN, ASA 81mg BID, Cefardoxil 500mg q12 x 7 days, Colace 100mg BID. Patient has not yet had a BM but is passing gas.      Patient reports fever last night and this morning of 101.5 with c/o chills. He denies further symptoms and states it is currently 98.3 s/p tylenol. I will route to surgeon. denies nausea, vomiting, abdominal pain, chest pain, shortness of breath,dizziness and calf pain. Patient confirmed post-op appointment with PT tomorrow, 2/7 and surgeon on 2/12. Patient does not have any other questions or concerns at this time. Pt was encouraged to call with any questions, concerns or issues.    "

## 2025-02-07 ENCOUNTER — OFFICE VISIT (OUTPATIENT)
Dept: PHYSICAL THERAPY | Facility: CLINIC | Age: 60
End: 2025-02-07
Payer: COMMERCIAL

## 2025-02-07 DIAGNOSIS — M16.12 PRIMARY OSTEOARTHRITIS OF ONE HIP, LEFT: Primary | ICD-10-CM

## 2025-02-07 PROCEDURE — 97110 THERAPEUTIC EXERCISES: CPT

## 2025-02-07 PROCEDURE — 97112 NEUROMUSCULAR REEDUCATION: CPT

## 2025-02-07 PROCEDURE — 97140 MANUAL THERAPY 1/> REGIONS: CPT

## 2025-02-07 NOTE — PROGRESS NOTES
First Post-op Treatment     Today's date: 2025  Patient name: Neptali Elias  : 1965  MRN: [id number]  Referring provider: Rosalia Higuera,*  Dx:   Encounter Diagnosis     ICD-10-CM    1. Primary osteoarthritis of one hip, left  M16.12           Start Time: 1100  Stop Time: 1146  Total time in clinic (min): 46 minutes    Subjective: Pt stated that he has been doing ok since the surgery but was sick and had a fever the day after the surgery but then went away. Pt stated that he has some swelling in his leg that he is concerned about but no signs of infection or excessive drainage. Pt stated that his pain has been well controlled with medication and has not been bothering him.       Objective: See treatment diary below      Assessment: Pt tolerated manual L hip PROM and stretching but did note some discomfort when going through L hamstrings stretch on the anterior aspect of the thigh that got worse with L knee bending. Pt performed isometric quad and glute activities well without much increase in discomfort with mild soreness post performance. Pt had difficulty with trial of active SLR flexion exercise, modified to performing active assisted with controlled eccentric lower, which pt was still challenged but was able to complete shorter sets of repetitions and was able to actively lift better post performance. Pt was challenged with trial of step ups with LLE but was able to complete full sets of repetitions. Pt tolerated application of ice at end of session well with reported decrease in discomfort post modality. Pt would benefit from continued skilled PT to improve LLE strength, mobility, ROM, flexibility, weight bearing tolerance, gait, balance, and functional ability.     Plan: Continue per plan of care.  Progress treatment as tolerated.       Precautions: s/p anterior L ADDISON on 25.   Avoid hip extension, adduction, and external rotation for first 6 weeks    Date           "  Visit # 1 2           FOTO IE             Re-eval IE              Manuals 1/27 2/7           L hip PROM  DK                                                  Neuro Re-Ed 1/27 2/7           Quad sets 10x3\" L 20x3\" L           Glute sets  2x10 3\" h/l           Heel slides 5x10\"            Clamshells             SAQ  20x3\"            SLS                          Ther Ex 1/27 2/7           bike  nv           SLR 10x L 2x5 active assisted           S/l hip abd  2x10 L           Standing hip abd/flex 10x L abd 2x10 ea abd           marches  2x10 ea UE sup           Leg press  nv           HS str             sidestepping             Monster walks                                       Ther Activity 1/27 2/7           Step ups  2x10 L up 0R           Squats 10x  20x           Gait Training 1/27                                      Modalities 1/27 2/7           ice prn 8'                             "

## 2025-02-08 ENCOUNTER — NURSE TRIAGE (OUTPATIENT)
Dept: OTHER | Facility: OTHER | Age: 60
End: 2025-02-08

## 2025-02-08 DIAGNOSIS — M16.12 PRIMARY OSTEOARTHRITIS OF LEFT HIP: Primary | ICD-10-CM

## 2025-02-08 RX ORDER — OXYCODONE AND ACETAMINOPHEN 5; 325 MG/1; MG/1
1 TABLET ORAL EVERY 4 HOURS PRN
Qty: 30 TABLET | Refills: 0 | Status: SHIPPED | OUTPATIENT
Start: 2025-02-08

## 2025-02-08 RX ORDER — CYCLOBENZAPRINE HCL 10 MG
10 TABLET ORAL
Qty: 20 TABLET | Refills: 0 | Status: SHIPPED | OUTPATIENT
Start: 2025-02-08

## 2025-02-08 NOTE — TELEPHONE ENCOUNTER
Regarding: Left Hip Surgery / Pt wants Meds for pain.  ----- Message from Leida RM sent at 2/8/2025  9:50 AM EST -----  Pt is requesting meds for pain, he had left hip surgery on Tuesday was giving pain med but he said they did not help with the pain. Pt is very frustrated and has a Pain Level of 10/10.     Please advise

## 2025-02-08 NOTE — TELEPHONE ENCOUNTER
"On call Provider paged and stated they will call the patient directly.     Reason for Disposition   [1] Post-op pain AND [2] not controlled with pain medications    Answer Assessment - Initial Assessment Questions  1. SYMPTOM: \"What's the main symptom you're concerned about?\" (e.g., drainage, incision opened up, pain, redness)        Pain 9/10 and swelling    2. ONSET: \"When did the pain  start?\"        Ongoing since surgery    3. SURGERY: \"What surgery did you have?\"       Left hip replacement    4. DATE of SURGERY: \"When was the surgery?\"         2/4/25    5. INCISION SITE: \"Where is the incision located?\"         Left side    6. REDNESS: \"Is there any redness at the incision site?\" If Yes, ask: \"How wide across is the redness?\" (Inches, centimeters)         Just swollen like a football    7. PAIN: \"Is there any pain?\" If Yes, ask: \"How bad is it?\"  (Scale 1-10; or mild, moderate, severe)        Yes 9/10 and patient heard breathing heavily in pain    8. BLEEDING: \"Is there any bleeding?\" If Yes, ask: \"How much?\" and \"Where?\"        No    9. DRAINAGE: \"Is there any drainage from the incision site?\" If Yes, ask: \"What color and how much?\" (e.g., red, cloudy, pus; drops, teaspoon)        no    Protocols used: Post-Op Incision Symptoms and Questions-Adult-AH    "

## 2025-02-08 NOTE — PROGRESS NOTES
Dr. Hi spoke to patient on the phone. Patient requesting Percocet for pain. He was also prescribed flexeril to help reduce muscle spasms. Discussed appropriate use of his post op pain medication. Discontinue oxy 5 mg and tylenol 500 mg.

## 2025-02-10 ENCOUNTER — OFFICE VISIT (OUTPATIENT)
Dept: PHYSICAL THERAPY | Facility: CLINIC | Age: 60
End: 2025-02-10
Payer: COMMERCIAL

## 2025-02-10 DIAGNOSIS — M16.12 PRIMARY OSTEOARTHRITIS OF ONE HIP, LEFT: Primary | ICD-10-CM

## 2025-02-10 PROCEDURE — 97140 MANUAL THERAPY 1/> REGIONS: CPT

## 2025-02-10 PROCEDURE — 97010 HOT OR COLD PACKS THERAPY: CPT

## 2025-02-10 PROCEDURE — 97112 NEUROMUSCULAR REEDUCATION: CPT

## 2025-02-10 PROCEDURE — 97110 THERAPEUTIC EXERCISES: CPT

## 2025-02-10 NOTE — PROGRESS NOTES
"Daily Note     Today's date: 2/10/2025  Patient name: Neptali Elias  : 1965  MRN: [id number]  Referring provider: Rosalia Higuera,*  Dx:   Encounter Diagnosis     ICD-10-CM    1. Primary osteoarthritis of one hip, left  M16.12           Start Time: 1530  Stop Time: 1630  Total time in clinic (min): 60 minutes    Subjective: Pt stated that he feels ok but does continue to have pain and stiffness in the L hip when ambulating and moving the LLE.       Objective: See treatment diary below      Assessment: Pt was educated to perform rocking revolutions on bike as warm up to his tolerance and nursing home through warm up pt was able to comfortably perform full forwards revolutions for rest of warm up. Pt was challenged appropriately with addition of leg press activity with mild fatigue post performance but no increase in discomfort during exercise. Pt showed improvement in step activity with LLE without use of UE support, continue to progress height and challenge as tolerated. Pt was challenged with trial of standing hip flexion with straight leg with mild fatigue, soreness, and discomfort post performance. Pt tolerated manual LLE PROM and stretching with reported decrease in stiffness post manual treatment. Pt  Pt would benefit from continued skilled PT to improve LLE strength, balance, gait, weight bearing tolerance, and functional ability.       Plan: Continue per plan of care.  Progress treatment as tolerated.       Precautions: s/p anterior L ADDISON on 25.   Avoid hip extension, adduction, and external rotation for first 6 weeks    Date 1/27 2/7 2/10          Visit # 1 2 3          FOTO IE             Re-eval IE              Manuals 1/27 2/7 2/10          L hip PROM  DK DK flex focus                                                 Neuro Re-Ed 1/27 2/7 2/10          Quad sets 10x3\" L 20x3\" L           Glute sets  2x10 3\" h/l 2x10 3\" h/l          Heel slides 5x10\"            Clamshells           " "  SAQ  20x3\"  20x3\"          SLS   5x5\", 2x11\" L                       Ther Ex 1/27 2/7 2/10          bike  nv 2.5' rocking, 2.5' full fwd          SLR 10x L 2x5 active assisted 3x5 active          S/l hip abd  2x10 L 2x10 L          Standing hip abd/flex 10x L abd 2x10 ea abd 2x10 ea          marches  2x10 ea UE sup 2x10 ea          Leg press  nv 2x15 55# BLE          HS str             sidestepping             Monster walks                                       Ther Activity 1/27 2/7 2/10          Step ups  2x10 L up 0R 2x10 L up 0R no UE          Squats 10x  20x 20x          Gait Training 1/27                                      Modalities 1/27 2/7 2/10          ice prn 8' 8' post                              "

## 2025-02-12 ENCOUNTER — OFFICE VISIT (OUTPATIENT)
Dept: OBGYN CLINIC | Facility: MEDICAL CENTER | Age: 60
End: 2025-02-12

## 2025-02-12 ENCOUNTER — APPOINTMENT (OUTPATIENT)
Dept: RADIOLOGY | Facility: MEDICAL CENTER | Age: 60
End: 2025-02-12
Payer: COMMERCIAL

## 2025-02-12 ENCOUNTER — OFFICE VISIT (OUTPATIENT)
Dept: PHYSICAL THERAPY | Facility: CLINIC | Age: 60
End: 2025-02-12
Payer: COMMERCIAL

## 2025-02-12 VITALS — WEIGHT: 196.2 LBS | BODY MASS INDEX: 29.06 KG/M2 | HEIGHT: 69 IN

## 2025-02-12 DIAGNOSIS — Z96.642 STATUS POST TOTAL HIP REPLACEMENT, LEFT: Primary | ICD-10-CM

## 2025-02-12 DIAGNOSIS — M16.12 PRIMARY OSTEOARTHRITIS OF ONE HIP, LEFT: Primary | ICD-10-CM

## 2025-02-12 DIAGNOSIS — Z96.642 STATUS POST TOTAL HIP REPLACEMENT, LEFT: ICD-10-CM

## 2025-02-12 PROCEDURE — 97112 NEUROMUSCULAR REEDUCATION: CPT

## 2025-02-12 PROCEDURE — 99024 POSTOP FOLLOW-UP VISIT: CPT | Performed by: ORTHOPAEDIC SURGERY

## 2025-02-12 PROCEDURE — 73502 X-RAY EXAM HIP UNI 2-3 VIEWS: CPT

## 2025-02-12 PROCEDURE — 97140 MANUAL THERAPY 1/> REGIONS: CPT

## 2025-02-12 PROCEDURE — 97110 THERAPEUTIC EXERCISES: CPT

## 2025-02-12 NOTE — PROGRESS NOTES
"Daily Note     Today's date: 2025  Patient name: Neptali Elias  : 1965  MRN: [id number]  Referring provider: Rosalia Higuera,*  Dx:   Encounter Diagnosis     ICD-10-CM    1. Primary osteoarthritis of one hip, left  M16.12                      Subjective: Pt presents to PT reporting soreness in anterior L hip but states he feels good.      Objective: See treatment diary below      Assessment: Pt demonstrates good tolerance to TE for where he is at in his protocol. Pt had most difficulty with SLR secondary to pain.  Added clamshells today with positive response.  Pt able to increase time with SLS today with minimal difficulty. Patient demonstrated fatigue post treatment, exhibited good technique with therapeutic exercises, and would benefit from continued PT to increase flexibility, strength and function.    Plan: Continue per plan of care.      Precautions: s/p anterior L ADDISON on 25.   Avoid hip extension, adduction, and external rotation for first 6 weeks    Date 1/27 2/7 2/10 2/12         Visit # 1 2 3 4         FOTO IE             Re-eval IE              Manuals 1/27 2/7 2/10 2/12         L hip PROM  DK DK flex focus PK  Flex focus                                                Neuro Re-Ed 1/27 2/7 2/10 2/12         Quad sets 10x3\" L 20x3\" L  --         Glute sets  2x10 3\" h/l 2x10 3\" h/l 2x10 3\" h/l         Heel slides 5x10\"   --         Clamshells    3\" x 15 L         SAQ  20x3\"  20x3\" nv         SLS   5x5\", 2x11\" L 3 x 12\",  2 x 15\"                      Ther Ex 1/27 2/7 2/10 2/12         bike  nv 2.5' rocking, 2.5' full fwd 6' occ partial rev         SLR 10x L 2x5 active assisted 3x5 active 3 x 5         S/l hip abd  2x10 L 2x10 L 2x10 L         Standing hip abd/flex 10x L abd 2x10 ea abd 2x10 ea 2x10 ea         marches  2x10 ea UE sup 2x10 ea 2 x 10 ea         Leg press  nv 2x15 55# BLE 2x15 65# BLE         HS str             sidestepping             Monster walks               "                         Ther Activity 1/27 2/7 2/10 2/12         Step ups  2x10 L up 0R 2x10 L up 0R no UE 2x10 L up 0R no UE         Squats 10x  20x 20x 20x         Gait Training 1/27 2/12                                   Modalities 1/27 2/7 2/10 2/12         ice prn 8' 8' post 10' post

## 2025-02-12 NOTE — PROGRESS NOTES
Name: Neptali Elias      : 1965      MRN: [id number]  Encounter Provider: Rosalia Higuera DO  Encounter Date: 2025   Encounter department: [facility] [place]  :  Assessment & Plan  Status post total hip replacement, left    Orders:    XR hip/pelv 2-3 vws left if performed; Future      Patient is 1 weeks status post left ADDISON  Transition to outpatient PT  Pain control prn- oxycodone and tylenol, patient aware to wean away from narcotics.  He did get a prescription from the on-call physician assistant this past weekend.  1 tablet was ordered every 4 hours but he had to use to due to pain.  I did tell him if he needs a refill he may have to pay out-of-pocket because he used them up too soon.  They also ordered Flexeril which she states he did not .  Also continue the Celebrex twice a day as ordered as well as gabapentin  Continue with ASA 81 mg BID for DVT prophylaxis  ANGELA stockings as needed for swelling  May shower and let water run over incision, do not submerge incision  Continue with anterior hip precautions  Patient should call ahead for abx prior to dental appts.     Return in about 4 weeks (around 3/12/2025) for re-evaluation.    I answered all of the patient's questions during the visit and provided education of the patient's condition during the visit.  The patient verbalized understanding of the information given and agrees with the plan.  This note was dictated using Ther[employer] software.  It may contain errors including improperly dictated words.  Please contact physician directly for any questions.    History of Present Illness   HPI  Chief Complaint:   Chief Complaint   Patient presents with    Left Hip - Post-op       Patient is a 59 y.o. year old male who presents 1 week status post ARTHROPLASTY HIP TOTAL ANTERIOR,NAVIGATED, potential same day discharge - Left. Since their prior visit, he reports minimal improvement. In the days after  surgery he was in severe pain rated 10/10,  and Oxycodone and Flexeril were prescribed at that time to aid in pain control. Today he describes constant pain, mildly improving every day, rated 6/10 on average that is well controlled with Tylenol, Celebrex, and Oxycodone. He has initiated Physical Therapy where they note pain during sessions and soreness after sessions  The patient has been weight bearing as tolerated on the left lower extremity with use of his walker. The patient denies calf pain, chest pain/shortness of breath, redness or drainage from the incision. He has had persistent fever ranging above 101 degrees, well controlled with Tylenol. The patient is taking ASA 81 mg for DVT prophylaxis.       Review of Systems  ROS:    See HPI for musculoskeletal review.   All other systems reviewed are negative     History:  Past Medical History:   Diagnosis Date    Colon polyp     GERD (gastroesophageal reflux disease)     Hyperlipidemia     Hypertension     Sleep apnea      Past Surgical History:   Procedure Laterality Date    ACHILLES TENDON REPAIR Right     1983 in [place]    BACK SURGERY  2009    herniated L5-S1 disc repair    COLONOSCOPY      ND ARTHRP ACETBLR/PROX FEM PROSTC AGRFT/ALGRFT Left 2/4/2025    Procedure: ARTHROPLASTY HIP TOTAL ANTERIOR,NAVIGATED, potential same day discharge;  Surgeon: Rosalia Higuear DO;  Location:  MAIN OR;  Service: Orthopedics     Social History   Social History     Substance and Sexual Activity   Alcohol Use Not Currently    Comment: used to drink 2-3x/week 1 bottle of wine     Social History     Substance and Sexual Activity   Drug Use Not Currently    Types: Marijuana    Comment: THC 3 times a week     Social History     Tobacco Use   Smoking Status Some Days    Types: Cigars, Cigarettes   Smokeless Tobacco Never   Tobacco Comments    1 cigar a day     Family History:   Family History   Problem Relation Age of Onset    Hypertension Mother     Hyperlipidemia Mother      Prostate cancer Father     Hyperlipidemia Father     Stroke Father     Diabetes type II Brother     Hypertension Brother     Prostate cancer Brother        Current Outpatient Medications on File Prior to Visit   Medication Sig Dispense Refill    acetaminophen (TYLENOL) 500 mg tablet Take 2 tablets (1,000 mg total) by mouth every 8 (eight) hours 90 tablet 0    amLODIPine (NORVASC) 10 mg tablet TAKE 1 TABLET BY MOUTH EVERY DAY 90 tablet 1    ascorbic acid (VITAMIN C) 500 mg tablet TAKE 1 TABLET (500 MG TOTAL) BY MOUTH DAILY BEGIN 30 DAYS PRIOR TO SURGERY 30 tablet 1    aspirin (Aspirin 81) 81 mg EC tablet Take 1 tablet (81 mg total) by mouth every 12 (twelve) hours Take with food for blood clot prevention 84 tablet 0    atorvastatin (LIPITOR) 80 mg tablet Take 80 mg by mouth daily      b complex vitamins capsule Take 1 capsule by mouth daily      Blood Pressure KIT Use 2 (two) times a day 1 kit 0    [] cefadroxil (DURICEF) 500 mg capsule Take 1 capsule (500 mg total) by mouth every 12 (twelve) hours for 7 days 14 capsule 0    celecoxib (CeleBREX) 200 mg capsule Take 1 capsule (200 mg total) by mouth 2 (two) times a day 60 capsule 0    cyclobenzaprine (FLEXERIL) 10 mg tablet Take 1 tablet (10 mg total) by mouth daily at bedtime as needed for muscle spasms 20 tablet 0    docusate sodium (COLACE) 100 mg capsule Take 1 capsule (100 mg total) by mouth 2 (two) times a day 30 capsule 0    ferrous sulfate 324 (65 Fe) mg Take 1 tablet (324 mg total) by mouth daily before breakfast Begin 30 days prior to surgery 30 tablet 1    folic acid (KP Folic Acid) 1 mg tablet Take 1 tablet (1 mg total) by mouth daily Begin 30 days prior to surgery 30 tablet 1    gabapentin (Neurontin) 100 mg capsule Take 1 capsule (100 mg total) by mouth 3 (three) times a day 90 capsule 0    losartan (Cozaar) 50 mg tablet Take 1 tablet (50 mg total) by mouth daily 30 tablet 5    Multiple Vitamin (multivitamin) tablet Take 1 tablet by mouth daily  "Begin 30 days prior to surgery 30 tablet 1    omeprazole (PriLOSEC) 20 mg delayed release capsule Take 20 mg by mouth daily      [] oxyCODONE (Roxicodone) 5 immediate release tablet Take 1 tablet (5 mg total) by mouth every 4 (four) hours as needed for moderate pain for up to 7 days Max Daily Amount: 30 mg 42 tablet 0    oxyCODONE-acetaminophen (Percocet) 5-325 mg per tablet Take 1 tablet by mouth every 4 (four) hours as needed for severe pain Continue current therapy. Discontinue oxycodone 5 mg and tylenol 500 mg. Max Daily Amount: 6 tablets 30 tablet 0    promethazine (PHENERGAN) 12.5 MG tablet Take 1 tablet (12.5 mg total) by mouth every 6 (six) hours as needed for nausea or vomiting 12 tablet 0    Wegovy 1 MG/0.5ML Inject 1 mg under the skin once a week       Current Facility-Administered Medications on File Prior to Visit   Medication Dose Route Frequency Provider Last Rate Last Admin    ropivacaine (NAROPIN) injection 4 mL  4 mL Intra-articular Titrated    4 mL at 24 1446     No Known Allergies     Objective   Ht 5' 9\" (1.753 m)   Wt 89 kg (196 lb 3.2 oz)   BMI 28.97 kg/m²         PE:  AAOx 3  WDWN  Hearing intact, no drainage from eyes  no audible wheezing  no abdominal distension  LE compartments soft, AT/GS intact    Ortho Exam:  left hip:   INC: C/D/I, No erythema, mild swelling  No pain with ROM  Leg lengths even, left hip flexion 80, internal rotation 0, external 30.    Imaging Studies:   XR left hip:  S/p ADDISON, adequate alignment       Scribe Attestation      I,:   am acting as a scribe while in the presence of the attending physician.:       I,:   personally performed the services described in this documentation    as scribed in my presence.:             "

## 2025-02-14 ENCOUNTER — OFFICE VISIT (OUTPATIENT)
Dept: PHYSICAL THERAPY | Facility: CLINIC | Age: 60
End: 2025-02-14
Payer: COMMERCIAL

## 2025-02-14 DIAGNOSIS — M16.12 PRIMARY OSTEOARTHRITIS OF ONE HIP, LEFT: Primary | ICD-10-CM

## 2025-02-14 PROCEDURE — 97140 MANUAL THERAPY 1/> REGIONS: CPT

## 2025-02-14 PROCEDURE — 97110 THERAPEUTIC EXERCISES: CPT

## 2025-02-14 PROCEDURE — 97010 HOT OR COLD PACKS THERAPY: CPT

## 2025-02-14 PROCEDURE — 97112 NEUROMUSCULAR REEDUCATION: CPT

## 2025-02-14 NOTE — PROGRESS NOTES
"Daily Note     Today's date: 2025  Patient name: Neptali Elias  : 1965  MRN: [id number]  Referring provider: Rosalia Higuera,*  Dx:   Encounter Diagnosis     ICD-10-CM    1. Primary osteoarthritis of one hip, left  M16.12           Start Time: 1030  Stop Time: 1120  Total time in clinic (min): 50 minutes    Subjective: Pt stated that he is doing a little better today with less swelling and feels like he is walking with less pain. Pt stated at his MD appointment he got his bandage removed and his MD stated everything is looking good.       Objective: See treatment diary below      Assessment: Pt tolerated warm up on bike with rocking at first then transitioning to full forwards revolutions well at beginning of session without increase in discomfort during or after activity. Pt tolerated manual L hip PROM and stretching with reported decrease in stiffness post manual treatment. Pt demonstrated improvement in height performed with active straight leg raise flexion exercise and was able to perform increased repetitions as well. Pt was challenged with progression of resistance with standing BLE strengthening activities with TB. Pt would benefit from continued skilled PT to improve LLE strength, mobility, flexibility, gait, balance, and functional ability.      Plan: Continue per plan of care.  Progress treatment as tolerated.       Precautions: s/p anterior L ADDISON on 25.   Avoid hip extension, adduction, and external rotation for first 6 weeks    Date 1/27 2/7 2/10 2/12 2/14        Visit # 1 2 3 4 5        FOTO IE    done         Re-eval IE              Manuals 1/27 2/7 2/10 2/12 2/14        L hip PROM  DK DK flex focus PK  Flex focus DK                                               Neuro Re-Ed 1/27 2/7 2/10 2/12 2/14        Quad sets 10x3\" L 20x3\" L  --         Glute sets  2x10 3\" h/l 2x10 3\" h/l 2x10 3\" h/l 2x10 3\" h/l        Heel slides 5x10\"   --         Clamshells    3\" x 15 L 2x10 " "3\" OTB L s/l        SAQ  20x3\"  20x3\" nv 2x10 2# L        SLS   5x5\", 2x11\" L 3 x 12\",  2 x 15\" 2x30\"                     Ther Ex 1/27 2/7 2/10 2/12 2/14        bike  nv 2.5' rocking, 2.5' full fwd 6' occ partial rev 2' rocking, 4' full fwd        SLR 10x L 2x5 active assisted 3x5 active 3 x 5 4x5        S/l hip abd  2x10 L 2x10 L 2x10 L 2x10 L 2#        Standing hip abd/flex 10x L abd 2x10 ea abd 2x10 ea 2x10 ea 2x10 ea abd OTB        marches  2x10 ea UE sup 2x10 ea 2 x 10 ea 2x10 ea OTB        Leg press  nv 2x15 55# BLE 2x15 65# BLE 2x15 75# BLE        HS str             sidestepping             Monster walks                                       Ther Activity 1/27 2/7 2/10 2/12 2/14        Step ups  2x10 L up 0R 2x10 L up 0R no UE 2x10 L up 0R no UE 2x10 L up 0R no UE        Squats 10x  20x 20x 20x 2x10        Gait Training 1/27 2/12 2/14                                  Modalities 1/27 2/7 2/10 2/12 2/14        ice prn 8' 8' post 10' post 10' post                                "

## 2025-02-17 ENCOUNTER — APPOINTMENT (OUTPATIENT)
Dept: PHYSICAL THERAPY | Facility: CLINIC | Age: 60
End: 2025-02-17
Payer: COMMERCIAL

## 2025-02-19 ENCOUNTER — OFFICE VISIT (OUTPATIENT)
Dept: PHYSICAL THERAPY | Facility: CLINIC | Age: 60
End: 2025-02-19
Payer: COMMERCIAL

## 2025-02-19 DIAGNOSIS — M16.12 PRIMARY OSTEOARTHRITIS OF ONE HIP, LEFT: Primary | ICD-10-CM

## 2025-02-19 PROCEDURE — 97112 NEUROMUSCULAR REEDUCATION: CPT

## 2025-02-19 PROCEDURE — 97110 THERAPEUTIC EXERCISES: CPT

## 2025-02-19 NOTE — PROGRESS NOTES
"Daily Note     Today's date: 2025  Patient name: Neptali Elias  : 1965  MRN: [id number]  Referring provider: Rosalia Higuera,*  Dx:   Encounter Diagnosis     ICD-10-CM    1. Primary osteoarthritis of one hip, left  M16.12           Start Time: 1215  Stop Time: 1240  Total time in clinic (min): 25 minutes    Subjective: Pt presents to the clinic 15 minutes late, modified session accordingly. Pt stated that he continues to have some tightness and stiffness of the LLE but has been able to lift it up better. Pt presents to the clinic ambulating with spc which has been difficult but prefers it to the RW.       Objective: See treatment diary below      Assessment: Pt tolerated manual LLE stretching and PROM into flexion with hamstrings stretching with reported decrease in stiffness post manual treatment. Pt was challenged with progression of repetitions per set of SLR flexion but did have some soreness on the anterior aspect of his L thigh and hip flexor. Pt was challenged with progression of step height with step up activity with LLE. Pt tolerated progression of activities using TB resistance today with mild fatigue post performance. Pt would benefit from continued skilled PT to improve LLE strength, mobility, flexibility, gait, balance, and functional ability.       Plan: Continue per plan of care.  Progress treatment as tolerated.       Precautions: s/p anterior L ADDISON on 25.   Avoid hip extension, adduction, and external rotation for first 6 weeks    Date 1/27 2/7 2/10 2/12 2/14 2/19       Visit # 1 2 3 4 5 6       FOTO IE    done         Re-eval IE              Manuals 1/27 2/7 2/10 2/12 2/14 2/19       L hip PROM  DK DK flex focus PK  Flex focus DK DK                                              Neuro Re-Ed 1/27 2/7 2/10 2/12 2/14 2/19       Quad sets 10x3\" L 20x3\" L  --         Glute sets  2x10 3\" h/l 2x10 3\" h/l 2x10 3\" h/l 2x10 3\" h/l 2x10 3\" h/l       Heel slides 5x10\"   --       " "  Clamshells    3\" x 15 L 2x10 3\" OTB L s/l        SAQ  20x3\"  20x3\" nv 2x10 2# L 2x10 2# L       SLS   5x5\", 2x11\" L 3 x 12\",  2 x 15\" 2x30\" 2x30\" airex nv                    Ther Ex 1/27 2/7 2/10 2/12 2/14 2/19       bike  nv 2.5' rocking, 2.5' full fwd 6' occ partial rev 2' rocking, 4' full fwd nv       SLR 10x L 2x5 active assisted 3x5 active 3 x 5 4x5 2x10 w QS       S/l hip abd  2x10 L 2x10 L 2x10 L 2x10 L 2# 2x10 L 2#       Standing hip abd/flex 10x L abd 2x10 ea abd 2x10 ea 2x10 ea 2x10 ea abd OTB 2x10 ea abd PTB       marches  2x10 ea UE sup 2x10 ea 2 x 10 ea 2x10 ea OTB 2x10 ea PTB       Leg press  nv 2x15 55# BLE 2x15 65# BLE 2x15 75# BLE 2x15 85# BLE, s/l nv       HS str             sidestepping      nv       Monster walks                                       Ther Activity 1/27 2/7 2/10 2/12 2/14 2/19       Step ups  2x10 L up 0R 2x10 L up 0R no UE 2x10 L up 0R no UE 2x10 L up 0R no UE 2x10 L up 1R no UE       Squats 10x  20x 20x 20x 2x10        Gait Training 1/27 2/12 2/14 2/19                                 Modalities 1/27 2/7 2/10 2/12 2/14 2/19       ice prn 8' 8' post 10' post 10' post                                  "

## 2025-02-20 ENCOUNTER — APPOINTMENT (OUTPATIENT)
Dept: PHYSICAL THERAPY | Facility: CLINIC | Age: 60
End: 2025-02-20
Payer: COMMERCIAL

## 2025-02-21 ENCOUNTER — OFFICE VISIT (OUTPATIENT)
Dept: PHYSICAL THERAPY | Facility: CLINIC | Age: 60
End: 2025-02-21
Payer: COMMERCIAL

## 2025-02-21 DIAGNOSIS — M16.12 PRIMARY OSTEOARTHRITIS OF ONE HIP, LEFT: Primary | ICD-10-CM

## 2025-02-21 PROCEDURE — 97110 THERAPEUTIC EXERCISES: CPT

## 2025-02-21 PROCEDURE — 97112 NEUROMUSCULAR REEDUCATION: CPT

## 2025-02-21 NOTE — PROGRESS NOTES
"Daily Note     Today's date: 2025  Patient name: Neptali Elias  : 1965  MRN: [id number]  Referring provider: Rosalia Higuera,*  Dx:   Encounter Diagnosis     ICD-10-CM    1. Primary osteoarthritis of one hip, left  M16.12           Start Time: 1025  Stop Time: 1115  Total time in clinic (min): 50 minutes    Subjective: Pt stated that he is feeling a little stiff and sore this morning.       Objective: See treatment diary below      Assessment: Pt tolerated warm up on bike with full revolutions forwards for entire duration well at beginning of session without increase in discomfort during or after activity. Pt tolerated manual LLE stretching and PROM with reported decrease in stiffness post manual treatment. Pt continues to be challenged with SLR flexion activity, but shows improvement in height achieved with slightly decreased discomfort post performance. Pt performed progression of challenge with balance activities and would benefit from continued progression as tolerated. Pt would benefit from continued skilled PT to improve LLE strength, mobility, flexibility, balance, gait, and functional ability.       Plan: Continue per plan of care.  Progress treatment as tolerated.       Precautions: s/p anterior L ADDISON on 25.   Avoid hip extension, adduction, and external rotation for first 6 weeks    Date 1/27 2/7 2/10 2/12 2/14 2/19 2/21      Visit # 1 2 3 4 5 6 7      FOTO IE    done         Re-eval IE              Manuals 1/27 2/7 2/10 2/12 2/14 2/19 2/21      L hip PROM  DK DK flex focus PK  Flex focus DK DK DK                                             Neuro Re-Ed 1/27 2/7 2/10 2/12 2/14 2/19 2/21      Quad sets 10x3\" L 20x3\" L  --         Glute sets  2x10 3\" h/l 2x10 3\" h/l 2x10 3\" h/l 2x10 3\" h/l 2x10 3\" h/l 2x10 3\" h/l      Heel slides 5x10\"   --         Clamshells    3\" x 15 L 2x10 3\" OTB L s/l  2x10 3\" OTB L s/l      SAQ  20x3\"  20x3\" nv 2x10 2# L 2x10 2# L 20x3\" 2# L      SLS   " "5x5\", 2x11\" L 3 x 12\",  2 x 15\" 2x30\" 2x30\" airex nv 36\", 52\", 54\" L airex                    Ther Ex 1/27 2/7 2/10 2/12 2/14 2/19 2/21      bike  nv 2.5' rocking, 2.5' full fwd 6' occ partial rev 2' rocking, 4' full fwd nv 6' full fwd      SLR 10x L 2x5 active assisted 3x5 active 3 x 5 4x5 2x10 w QS 2x10 w QS      S/l hip abd  2x10 L 2x10 L 2x10 L 2x10 L 2# 2x10 L 2# 2x10 L 2#      Standing hip abd/flex 10x L abd 2x10 ea abd 2x10 ea 2x10 ea 2x10 ea abd OTB 2x10 ea abd PTB 2x10 PTB ea abd      marches  2x10 ea UE sup 2x10 ea 2 x 10 ea 2x10 ea OTB 2x10 ea PTB 2x10 ea PTB      Leg press  nv 2x15 55# BLE 2x15 65# BLE 2x15 75# BLE 2x15 85# BLE, s/l nv 2x15 95# BLE, 2x10 55# LLE      HS str             sidestepping      nv 4 laps PTB      Monster walks       3 laps PTB                                Ther Activity 1/27 2/7 2/10 2/12 2/14 2/19 2/21      Step ups  2x10 L up 0R 2x10 L up 0R no UE 2x10 L up 0R no UE 2x10 L up 0R no UE 2x10 L up 1R no UE 2x10 L up 1R no UE      Squats 10x  20x 20x 20x 2x10  2x10      Gait Training 1/27 2/12 2/14 2/19                                 Modalities 1/27 2/7 2/10 2/12 2/14 2/19       ice prn 8' 8' post 10' post 10' post                                    "

## 2025-02-24 ENCOUNTER — OFFICE VISIT (OUTPATIENT)
Dept: PHYSICAL THERAPY | Facility: CLINIC | Age: 60
End: 2025-02-24
Payer: COMMERCIAL

## 2025-02-24 DIAGNOSIS — M16.12 PRIMARY OSTEOARTHRITIS OF ONE HIP, LEFT: Primary | ICD-10-CM

## 2025-02-24 DIAGNOSIS — Z01.818 PREOPERATIVE TESTING: ICD-10-CM

## 2025-02-24 PROCEDURE — 97010 HOT OR COLD PACKS THERAPY: CPT | Performed by: PHYSICAL THERAPIST

## 2025-02-24 PROCEDURE — 97112 NEUROMUSCULAR REEDUCATION: CPT | Performed by: PHYSICAL THERAPIST

## 2025-02-24 PROCEDURE — 97110 THERAPEUTIC EXERCISES: CPT | Performed by: PHYSICAL THERAPIST

## 2025-02-24 NOTE — PROGRESS NOTES
"Daily Note     Today's date: 2025  Patient name: Neptali Elias  : 1965  MRN: [id number]  Referring provider: Rosalia Higuera,*  Dx:   Encounter Diagnosis     ICD-10-CM    1. Primary osteoarthritis of one hip, left  M16.12       2. Preoperative testing  Z01.818           Start Time: 1425  Stop Time: 1522  Total time in clinic (min): 57 minutes    Subjective: Patient reports some L hip tightness. Pt reports some residual hip swelling, but gross leg swelling has improved.       Objective: See treatment diary below, L hip flex AROM 90 deg, L hip flex PROM 105 deg      Assessment: Patient continues to demonstrate limited hip flexion ROM, pt would benefit from continued PROM and hip flexor strengthening to improve ROM. Patient started AA hip flex with ball and tolerated it well with no pain noted. Patient exhibited maximal challenge with SLR, no extensor lag present, modified to AA SLR with improved difficulty level. Patient progressed step ups and exhibits good form. Moderate fatigue post at 6/10. Patient demonstrated fatigue post treatment, exhibited good technique with therapeutic exercises, and would benefit from continued PT.      Plan: Continue per plan of care.  Progress treatment as tolerated.       Precautions: s/p anterior L ADDISON on 25.   Avoid hip extension, adduction, and external rotation for first 6 weeks    Date 1/27 2/7 2/10 2/12 2/14 2/19 2/21 2/24     Visit # 1 2 3 4 5 6 7 8     FOTO IE    done         Re-eval IE              Manuals 1/27 2/7 2/10 2/12 2/14 2/19 2/21 2/24     L hip PROM  DK DK flex focus PK  Flex focus DK DK DK ND                                            Neuro Re-Ed 1/27 2/7 2/10 2/12 2/14 2/19 2/21 2/24     Quad sets 10x3\" L 20x3\" L  --         Glute sets  2x10 3\" h/l 2x10 3\" h/l 2x10 3\" h/l 2x10 3\" h/l 2x10 3\" h/l 2x10 3\" h/l 2x15 3\" mini glute bridges     Heel slides 5x10\"   --         Clamshells    3\" x 15 L 2x10 3\" OTB L s/l  2x10 3\" OTB L s/l np   " "  SAQ  20x3\"  20x3\" nv 2x10 2# L 2x10 2# L 20x3\" 2# L 2x10     SLS   5x5\", 2x11\" L 3 x 12\",  2 x 15\" 2x30\" 2x30\" airex nv 36\", 52\", 54\" L airex                    Ther Ex 1/27 2/7 2/10 2/12 2/14 2/19 2/21 2/24     bike  nv 2.5' rocking, 2.5' full fwd 6' occ partial rev 2' rocking, 4' full fwd nv 6' full fwd 6' full frwd     SLR 10x L 2x5 active assisted 3x5 active 3 x 5 4x5 2x10 w QS 2x10 w QS 2x10 w/ QS AA     S/l hip abd  2x10 L 2x10 L 2x10 L 2x10 L 2# 2x10 L 2# 2x10 L 2# 2x10 L 2#     Standing hip abd/flex 10x L abd 2x10 ea abd 2x10 ea 2x10 ea 2x10 ea abd OTB 2x10 ea abd PTB 2x10 PTB ea abd np     marches  2x10 ea UE sup 2x10 ea 2 x 10 ea 2x10 ea OTB 2x10 ea PTB 2x10 ea PTB 2x10 PTB     AA hip flex w/ ball        15x5\" RPB     Leg press  nv 2x15 55# BLE 2x15 65# BLE 2x15 75# BLE 2x15 85# BLE, s/l nv 2x15 95# BLE, 2x10 55# LLE 3x10 95# BLE     HS str             sidestepping      nv 4 laps PTB 4 laps PTB     Monster walks       3 laps PTB 4 laps PTB                               Ther Activity 1/27 2/7 2/10 2/12 2/14 2/19 2/21 2/24     Step ups  2x10 L up 0R 2x10 L up 0R no UE 2x10 L up 0R no UE 2x10 L up 0R no UE 2x10 L up 1R no UE 2x10 L up 1R no UE 2x20 L up 1R no UE     Squats 10x  20x 20x 20x 2x10  2x10 2x10     Gait Training 1/27 2/12 2/14 2/19 2/24                                Modalities 1/27 2/7 2/10 2/12 2/14 2/19 2/24      ice prn 8' 8' post 10' post 10' post  10' post                                    "

## 2025-02-26 ENCOUNTER — OFFICE VISIT (OUTPATIENT)
Dept: PHYSICAL THERAPY | Facility: CLINIC | Age: 60
End: 2025-02-26
Payer: COMMERCIAL

## 2025-02-26 DIAGNOSIS — Z01.818 PREOPERATIVE TESTING: ICD-10-CM

## 2025-02-26 DIAGNOSIS — M16.12 PRIMARY OSTEOARTHRITIS OF ONE HIP, LEFT: Primary | ICD-10-CM

## 2025-02-26 PROCEDURE — 97112 NEUROMUSCULAR REEDUCATION: CPT | Performed by: PHYSICAL THERAPIST

## 2025-02-26 PROCEDURE — 97010 HOT OR COLD PACKS THERAPY: CPT | Performed by: PHYSICAL THERAPIST

## 2025-02-26 PROCEDURE — 97110 THERAPEUTIC EXERCISES: CPT | Performed by: PHYSICAL THERAPIST

## 2025-02-26 NOTE — PROGRESS NOTES
"Daily Note     Today's date: 2025  Patient name: Neptali Elias  : 1965  MRN: [id number]  Referring provider: Rosalia Higuera,*  Dx:   Encounter Diagnosis     ICD-10-CM    1. Primary osteoarthritis of one hip, left  M16.12       2. Preoperative testing  Z01.818           Start Time: 1555  Stop Time: 1655  Total time in clinic (min): 60 minutes    Subjective: Patient reports less L hip swelling and stiffness today, no pain reported.       Objective: See treatment diary below      Assessment: Patient tolerated session well with some improvement in L hip flex ROM during PROM. Patient completed SLR without assistance and progressed resistance with side steps and monster walks exhibiting appropriate challenge. Patient exhibited excessive hip flexion during squats, VC, demonstration, and chair TC provided with improved form. Patient demonstrated fatigue post treatment, exhibited good technique with therapeutic exercises, and would benefit from continued PT      Plan: Continue per plan of care.  Progress treatment as tolerated.       Precautions: s/p anterior L ADDISON on 25.   Avoid hip extension, adduction, and external rotation for first 6 weeks    Date 1/27 2/7 2/10 2/12 2/14 2/19 2/21 2/24 2/26    Visit # 1 2 3 4 5 6 7 8 9    FOTO IE    done         Re-eval IE              Manuals 1/27 2/7 2/10 2/12 2/14 2/19 2/21 2/24 2/26    L hip PROM  DK DK flex focus PK  Flex focus DK DK DK ND ND                                            Neuro Re-Ed 1/27 2/7 2/10 2/12 2/14 2/19 2/21 2/24 2/26    Quad sets 10x3\" L 20x3\" L  --         Glute sets  2x10 3\" h/l 2x10 3\" h/l 2x10 3\" h/l 2x10 3\" h/l 2x10 3\" h/l 2x10 3\" h/l 2x15 3\" mini glute bridges 2x10 3\" mini bridges    Heel slides 5x10\"   --         Clamshells    3\" x 15 L 2x10 3\" OTB L s/l  2x10 3\" OTB L s/l np     SAQ  20x3\"  20x3\" nv 2x10 2# L 2x10 2# L 20x3\" 2# L 2x10 2x10 2# L    SLS   5x5\", 2x11\" L 3 x 12\",  2 x 15\" 2x30\" 2x30\" airex nv 36\", 52\", " "54\" L airex   nv                 Ther Ex 1/27 2/7 2/10 2/12 2/14 2/19 2/21 2/24 2/26    bike  nv 2.5' rocking, 2.5' full fwd 6' occ partial rev 2' rocking, 4' full fwd nv 6' full fwd 6' full frwd 6 full frwd    SLR 10x L 2x5 active assisted 3x5 active 3 x 5 4x5 2x10 w QS 2x10 w QS 2x10 w/ QS AA 2x10 2#    S/l hip abd  2x10 L 2x10 L 2x10 L 2x10 L 2# 2x10 L 2# 2x10 L 2# 2x10 L 2# 2x10 2# L    Standing hip abd/flex 10x L abd 2x10 ea abd 2x10 ea 2x10 ea 2x10 ea abd OTB 2x10 ea abd PTB 2x10 PTB ea abd np     marches  2x10 ea UE sup 2x10 ea 2 x 10 ea 2x10 ea OTB 2x10 ea PTB 2x10 ea PTB 2x10 PTB 2x15 OTB    AA hip flex w/ ball        15x5\" RPB 15x5\" RPB    Leg press  nv 2x15 55# BLE 2x15 65# BLE 2x15 75# BLE 2x15 85# BLE, s/l nv 2x15 95# BLE, 2x10 55# LLE 3x10 95# BLE 3x10     HS str             sidestepping      nv 4 laps PTB 4 laps PTB  4 laps BTB    Monster walks       3 laps PTB 4 laps PTB 4 laps BTB                              Ther Activity 1/27 2/7 2/10 2/12 2/14 2/19 2/21 2/24 2/26    Step ups  2x10 L up 0R 2x10 L up 0R no UE 2x10 L up 0R no UE 2x10 L up 0R no UE 2x10 L up 1R no UE 2x10 L up 1R no UE 2x20 L up 1R no UE 2x20 L up 1R    Squats 10x  20x 20x 20x 2x10  2x10 2x10 2x10  1x10 to chair    Gait Training 1/27 2/12 2/14 2/19 2/24                                Modalities 1/27 2/7 2/10 2/12 2/14 2/19 2/24  2/26    ice prn 8' 8' post 10' post 10' post  10' post  10' post                                    "

## 2025-02-28 ENCOUNTER — EVALUATION (OUTPATIENT)
Dept: PHYSICAL THERAPY | Facility: CLINIC | Age: 60
End: 2025-02-28
Payer: COMMERCIAL

## 2025-02-28 DIAGNOSIS — M16.12 PRIMARY OSTEOARTHRITIS OF ONE HIP, LEFT: Primary | ICD-10-CM

## 2025-02-28 DIAGNOSIS — Z01.818 PREOPERATIVE TESTING: ICD-10-CM

## 2025-02-28 PROCEDURE — 97110 THERAPEUTIC EXERCISES: CPT | Performed by: PHYSICAL THERAPIST

## 2025-02-28 PROCEDURE — 97112 NEUROMUSCULAR REEDUCATION: CPT | Performed by: PHYSICAL THERAPIST

## 2025-02-28 PROCEDURE — 97010 HOT OR COLD PACKS THERAPY: CPT | Performed by: PHYSICAL THERAPIST

## 2025-02-28 PROCEDURE — 97164 PT RE-EVAL EST PLAN CARE: CPT | Performed by: PHYSICAL THERAPIST

## 2025-02-28 NOTE — PROGRESS NOTES
PT Re-evaluation     Today's date: 2025  Patient name: Neptali Elias  : 1965  MRN: [id number]  Referring provider: Rosalia Higuera,*  Dx:   Encounter Diagnosis     ICD-10-CM    1. Primary osteoarthritis of one hip, left  M16.12 Ambulatory referral to Physical Therapy      2. Preoperative testing  Z01.818 Ambulatory referral to Physical Therapy              Assessment  Impairments: abnormal muscle firing, abnormal or restricted ROM, activity intolerance, impaired physical strength, lacks appropriate home exercise program, pain with function, weight-bearing intolerance and poor body mechanics  Symptom irritability: moderate    Assessment details: Pt is a 59 y.o. year old male presenting to physical therapy for Primary osteoarthritis of one hip, left and Preoperative testing.  He presents with the following impairments: decreased LLE strength, decreased LLE hip AROM, decreased L quad activation, decreased L SLS ability, weight bearing intolerance, poor squat mechanics, and antalgic gait affecting his function with walking, standing, navigating stairs, exercising, running, ADLs, and functional ability.  Pt will benefit from skilled physical therapy to address functional limitations noted in evaluation and meet patient goals.   Barriers to therapy: S/p L anterior ADDISON on 25.    25: Patient presents 9 visits post initial evaluation s/p L anterior ADDISON on 25. Patient has made improvements in SLS time on both LE, LE strength, and L fip flexion ROM and continues to exhibit limitations in hip ROM, quad activation, L LE strength, antalgic gait, and balance. Patient continues to demonstrate limitations with squat form due to limited L hip flexion ROM. Patient would benefit from continued skilled PT to address above limitations and meet patient goals.    Understanding of Dx/Px/POC: good       Prognosis: good    Goals  ST. Pt will be independent with HEP. - met  2. Pt will improve L  hip flexion AROM to 90 degrees or more in 3-4 weeks. - met  3. Pt will improve L quad activation to good. - in progress  LT. Pt will improve pain at rest to 0/10. - met  2. Pt will improve LLE strength grossly by 2 grades or more to improve functional ability. - in progress  3. Pt will improve squat mechanics to WNL. - in progress    Plan  Patient would benefit from: PT eval and skilled physical therapy  Planned modality interventions: biofeedback, manual electrical stimulation, microcurrent electrical stimulation, TENS, electrical stimulation/Russian stimulation, thermotherapy: hydrocollator packs, cryotherapy and unattended electrical stimulation    Planned therapy interventions: abdominal trunk stabilization, joint mobilization, manual therapy, massage, ADL retraining, neuromuscular re-education, body mechanics training, patient education, postural training, strengthening, stretching, therapeutic activities, therapeutic exercise, flexibility, functional ROM exercises and home exercise program    Frequency: 2x week  Duration in weeks: 12  Treatment plan discussed with: patient  Continue with PT POC per protocol and initial POC for 2x/wk for 12 weeks      Subjective Evaluation    History of Present Illness  Mechanism of injury: Pt presents to the clinic for pre operative testing for L anterior ADDISON scheduled for 25. Pt stated that he is nervous and excited for the surgery. Pt stated that he has had pain in his L hip for the past 4 years and has had a lot of difficulty walking, running, navigating stairs, ADLs, and bringing his leg up for dressing. Pt stated that he likes to run and would like to get back to doing that after the surgery. Pt stated that movement usually makes his pain worse and lying on it but at rest does not have any pain. 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  Patient Goals  Patient goals for therapy: increased motion, improved balance, decreased pain,  "increased strength, independence with ADLs/IADLs, return to sport/leisure activities and return to work.    25: Patient reports that his hip is feeling good today.     Pain  Current pain ratin  At best pain ratin  At worst pain ratin  Quality: tightness, stiffness      Objective     Active Range of Motion   Left Hip   Flexion: 90 degrees     Right Hip   Flexion: 108 degrees     Strength/Myotome Testing     Left Hip   Planes of Motion   Flexion: 4  Abduction: 5    Right Hip   Planes of Motion   Flexion: 5  Abduction: 5    Left Knee   Flexion: 5  Extension: 4+  Quadriceps contraction: fair    Right Knee   Flexion: 5  Extension: 5  Quadriceps contraction: good    General Comments:      Hip Comments   L SLS: 60 second, stopped at 60 sec  R SLS: 22 seconds.    Poor squat mechanics.  Antalgic gait noted.           Precautions: s/p anterior L ADDISON on 25.   Avoid hip extension, adduction, and external rotation for first weeks    Date 1/27 2/7 2/10 2/12 2/14 2/19 2/21 2/24 2/26 2/28   Visit # 1 2 3 4 5 6 7 8 9 10   FOTO IE    done      done   Re-eval IE         done     Manuals 1/27 2/7 2/10 2/12 2/14 2/19 2/21 2/24 2/26 2/28   L hip PROM  DK DK flex focus PK  Flex focus DK DK DK ND ND  ND   Prone quad str                                       Neuro Re-Ed 1/27 2/7 2/10 2/12 2/14 2/19 2/21 2/24 2/26 2/28   Quad sets 10x3\" L 20x3\" L  --         Glute sets  2x10 3\" h/l 2x10 3\" h/l 2x10 3\" h/l 2x10 3\" h/l 2x10 3\" h/l 2x10 3\" h/l 2x15 3\" mini glute bridges 2x10 3\" mini bridges 3x10 5\" mini bridges   Heel slides 5x10\"   --         Clamshells    3\" x 15 L 2x10 3\" OTB L s/l  2x10 3\" OTB L s/l np     SAQ  20x3\"  20x3\" nv 2x10 2# L 2x10 2# L 20x3\" 2# L 2x10 2x10 2# L    LAQ          3x10   SLS   5x5\", 2x11\" L 3 x 12\",  2 x 15\" 2x30\" 2x30\" airex nv 36\", 52\", 54\" L airex   nv 60\" L, 22\" R                Ther Ex 1/27 2/7 2/10 2/12 2/14 2/19 2/21 2/24 2/26 2/28   bike  nv 2.5' rocking, 2.5' full fwd 6' occ partial rev " "2' rocking, 4' full fwd nv 6' full fwd 6' full frwd 6 full frwd 6' full   SLR 10x L 2x5 active assisted 3x5 active 3 x 5 4x5 2x10 w QS 2x10 w QS 2x10 w/ QS AA 2x10 2# 3x10 increased height   S/l hip abd  2x10 L 2x10 L 2x10 L 2x10 L 2# 2x10 L 2# 2x10 L 2# 2x10 L 2# 2x10 2# L 3x10 L 2#   Standing hip abd/flex 10x L abd 2x10 ea abd 2x10 ea 2x10 ea 2x10 ea abd OTB 2x10 ea abd PTB 2x10 PTB ea abd np     marches  2x10 ea UE sup 2x10 ea 2 x 10 ea 2x10 ea OTB 2x10 ea PTB 2x10 ea PTB 2x10 PTB 2x15 OTB    AA hip flex w/ ball        15x5\" RPB 15x5\" RPB 15x5\" RPB   Leg press  nv 2x15 55# BLE 2x15 65# BLE 2x15 75# BLE 2x15 85# BLE, s/l nv 2x15 95# BLE, 2x10 55# LLE 3x10 95# BLE 3x10 95# BLE  npv   HS str             sidestepping      nv 4 laps PTB 4 laps PTB  4 laps BTB  4 laps BTB   Monster walks       3 laps PTB 4 laps PTB 4 laps BTB                              Ther Activity 1/27 2/7 2/10 2/12 2/14 2/19 2/21 2/24 2/26 2/28   Step ups  2x10 L up 0R 2x10 L up 0R no UE 2x10 L up 0R no UE 2x10 L up 0R no UE 2x10 L up 1R no UE 2x10 L up 1R no UE 2x20 L up 1R no UE 2x20 L up 1R np   Squats 10x  20x 20x 20x 2x10  2x10 2x10 2x10  1x10 to chair np   Gait Training 1/27 2/12 2/14 2/19 2/24                                Modalities 1/27 2/7 2/10 2/12 2/14 2/19 2/24  2/26 2/28   ice prn 8' 8' post 10' post 10' post  10' post  10' post 10' post                                      "

## 2025-03-03 ENCOUNTER — APPOINTMENT (OUTPATIENT)
Dept: PHYSICAL THERAPY | Facility: CLINIC | Age: 60
End: 2025-03-03
Payer: COMMERCIAL

## 2025-03-03 ENCOUNTER — OFFICE VISIT (OUTPATIENT)
Dept: PHYSICAL THERAPY | Facility: CLINIC | Age: 60
End: 2025-03-03
Payer: COMMERCIAL

## 2025-03-03 DIAGNOSIS — M16.12 PRIMARY OSTEOARTHRITIS OF ONE HIP, LEFT: Primary | ICD-10-CM

## 2025-03-03 PROCEDURE — 97010 HOT OR COLD PACKS THERAPY: CPT

## 2025-03-03 PROCEDURE — 97110 THERAPEUTIC EXERCISES: CPT

## 2025-03-03 PROCEDURE — 97112 NEUROMUSCULAR REEDUCATION: CPT

## 2025-03-03 NOTE — PROGRESS NOTES
"Daily Note     Today's date: 3/3/2025  Patient name: Neptali Elias  : 1965  MRN: [id number]  Referring provider: Rosalia Higuera,*  Dx:   Encounter Diagnosis     ICD-10-CM    1. Primary osteoarthritis of one hip, left  M16.12           Start Time: 1130  Stop Time: 1230  Total time in clinic (min): 60 minutes    Subjective: Pt stated that he is doing well today but does have some soreness in the hip at start of visit.       Objective: See treatment diary below      Assessment: Pt tolerated warm up on bike well at beginning of session without increase in discomfort during or after activity. Pt was challenged appropriately with progression of resistance with leg press activities and would benefit from continued progression as tolerated. Pt was challenged appropriately with standing BLE strengthening activities with TB resistance. Pt performed progression of step up activity well with focus on eccentric lower over and across standing on LLE to focus on quad control without increase in discomfort but with mild increase in soreness compared to last visit. Pt would benefit from continued skilled PT to improve LLE strength, mobility, flexibility, gait, balance, and functional ability.       Plan: Continue per plan of care.  Progress treatment as tolerated.       Precautions: s/p anterior L ADDISON on 25.   Avoid hip extension, adduction, and external rotation for first 6 weeks    Date       3/3   Visit #      6 7 8 9 10   FOTO         54/47    Re-eval               Manuals       3/3   L hip PROM      DK DK ND ND  DK                                          Neuro Re-Ed       3/3   Quad sets             Glute sets      2x10 3\" h/l 2x10 3\" h/l 2x15 3\" mini glute bridges 2x10 3\" mini bridges 2x10 3\" bridges   Heel slides             Clamshells       2x10 3\" OTB L s/l np     SAQ      2x10 2# L 20x3\" 2# L 2x10 2x10 2# L    LAQ          2x10 2.5# L " "  SLS      2x30\" airex nv 36\", 52\", 54\" L airex   nv                 Ther Ex      2/19 2/21 2/24 2/26 3/3   bike      nv 6' full fwd 6' full frwd 6 full frwd 6' full   SLR      2x10 w QS 2x10 w QS 2x10 w/ QS AA 2x10 2# 2x10 2.5# L   S/l hip abd      2x10 L 2# 2x10 L 2# 2x10 L 2# 2x10 2# L 2x10 2.5# L   Standing hip abd/flex      2x10 ea abd PTB 2x10 PTB ea abd np  2x10 ea abd   marches      2x10 ea PTB 2x10 ea PTB 2x10 PTB 2x15 OTB 2x10 BTB ea   AA hip flex w/ ball        15x5\" RPB 15x5\" RPB 15x5\" RSB   Leg press      2x15 85# BLE, s/l nv 2x15 95# BLE, 2x10 55# LLE 3x10 95# BLE 3x10  2x15 105# BLE, 2x10 65# LLE   HS str             sidestepping      nv 4 laps PTB 4 laps PTB  4 laps BTB 4 laps BTB    Monster walks       3 laps PTB 4 laps PTB 4 laps BTB 4 laps BTB                              Ther Activity      2/19 2/21 2/24 2/26 3/3   Step ups      2x10 L up 1R no UE 2x10 L up 1R no UE 2x20 L up 1R no UE 2x20 L up 1R 2x10 L up + over    Squats       2x10 2x10 2x10  1x10 to chair 2x10 to chair   Gait Training      2/19 2/24                                Modalities      2/19 2/24 2/26 3/3   ice       10' post  10' post 10' post                                     "

## 2025-03-05 ENCOUNTER — OFFICE VISIT (OUTPATIENT)
Dept: PHYSICAL THERAPY | Facility: CLINIC | Age: 60
End: 2025-03-05
Payer: COMMERCIAL

## 2025-03-05 DIAGNOSIS — M16.12 PRIMARY OSTEOARTHRITIS OF ONE HIP, LEFT: Primary | ICD-10-CM

## 2025-03-05 DIAGNOSIS — Z01.818 PREOPERATIVE TESTING: ICD-10-CM

## 2025-03-05 PROCEDURE — 97530 THERAPEUTIC ACTIVITIES: CPT

## 2025-03-05 PROCEDURE — 97140 MANUAL THERAPY 1/> REGIONS: CPT

## 2025-03-05 PROCEDURE — 97110 THERAPEUTIC EXERCISES: CPT

## 2025-03-05 NOTE — PROGRESS NOTES
"Daily Note     Today's date: 3/5/2025  Patient name: Neptali Elias  : 1965  MRN: [id number]  Referring provider: Rosalia Higuera,*  Dx:   Encounter Diagnosis     ICD-10-CM    1. Primary osteoarthritis of one hip, left  M16.12       2. Preoperative testing  Z01.818                      Subjective: Pt presents to PT reporting soreness and edema in L anterior hip.  He states he did attend the gym using the bike and working on upper body exercises.       Objective: See treatment diary below      Assessment: Pt demonstrates fair tolerance to TE today secondary to pain in anterior L quad.  Noted mild quad lag with SLR secondary to pain; removed weight for remaining SLR.  Pt reports + response to stretches.  Light program today secondary to pain.  Patient demonstrated fatigue post treatment, exhibited good technique with therapeutic exercises, and would benefit from continued PT to increase flexibility, strength and function.        Plan: Continue per plan of care.      Precautions: s/p anterior L ADDISON on 25.   Avoid hip extension, adduction, and external rotation for first 6 weeks    Date 3/5     2/19 2/21 2/24 2/26 33   Visit #      6 7 8 9 10   FOTO         54/47    Re-eval               Manuals 3/5     2/19 2/21 2/24 2/26 33   L hip PROM PK     DK DK ND ND  DK                                          Neuro Re-Ed 3/5     2/19 2/21 2/24 2/26 33   Quad sets             Glute sets nv     2x10 3\" h/l 2x10 3\" h/l 2x15 3\" mini glute bridges 2x10 3\" mini bridges 2x10 3\" bridges   Heel slides             Clamshells       2x10 3\" OTB L s/l np     SAQ      2x10 2# L 20x3\" 2# L 2x10 2x10 2# L    LAQ 2x10 2.5# L         2x10 2.5# L   SLS      2x30\" airex nv 36\", 52\", 54\" L airex   nv                 Ther Ex 3/5     2/19 2/21 2/24 2/26 3/3   bike 6' full     nv 6' full fwd 6' full frwd 6 full frwd 6' full   SLR 10x 2.5#, 10x 0# L     2x10 w QS 2x10 w QS 2x10 w/ QS AA 2x10 2# 2x10 2.5# L   S/l hip abd " "2x10 2.5# L     2x10 L 2# 2x10 L 2# 2x10 L 2# 2x10 2# L 2x10 2.5# L   Standing hip abd/flex 2x10 ea abd     2x10 ea abd PTB 2x10 PTB ea abd np  2x10 ea abd   marches Purp 2 x 10 ea     2x10 ea PTB 2x10 ea PTB 2x10 PTB 2x15 OTB 2x10 BTB ea   AA hip flex w/ ball nv       15x5\" RPB 15x5\" RPB 15x5\" RSB   Leg press nv     2x15 85# BLE, s/l nv 2x15 95# BLE, 2x10 55# LLE 3x10 95# BLE 3x10  2x15 105# BLE, 2x10 65# LLE   HS str             sidestepping nv     nv 4 laps PTB 4 laps PTB  4 laps BTB 4 laps BTB    Monster walks nv      3 laps PTB 4 laps PTB 4 laps BTB 4 laps BTB                              Ther Activity 3/5     2/19 2/21 2/24 2/26 3/3   Step ups 2x10 L up + over      2x10 L up 1R no UE 2x10 L up 1R no UE 2x20 L up 1R no UE 2x20 L up 1R 2x10 L up + over    Squats 2 x 10      2x10 2x10 2x10  1x10 to chair 2x10 to chair   Gait Training 3/5     2/19 2/24                                Modalities 3/5     2/19 2/24  2/26 3/3   ice       10' post  10' post 10' post                                       "

## 2025-03-07 ENCOUNTER — OFFICE VISIT (OUTPATIENT)
Dept: OBGYN CLINIC | Facility: MEDICAL CENTER | Age: 60
End: 2025-03-07

## 2025-03-07 ENCOUNTER — OFFICE VISIT (OUTPATIENT)
Dept: PHYSICAL THERAPY | Facility: CLINIC | Age: 60
End: 2025-03-07
Payer: COMMERCIAL

## 2025-03-07 ENCOUNTER — APPOINTMENT (OUTPATIENT)
Dept: PHYSICAL THERAPY | Facility: CLINIC | Age: 60
End: 2025-03-07
Payer: COMMERCIAL

## 2025-03-07 VITALS — HEIGHT: 69 IN | WEIGHT: 203.4 LBS | BODY MASS INDEX: 30.13 KG/M2

## 2025-03-07 DIAGNOSIS — M16.12 PRIMARY OSTEOARTHRITIS OF ONE HIP, LEFT: Primary | ICD-10-CM

## 2025-03-07 DIAGNOSIS — Z01.818 PREOPERATIVE TESTING: ICD-10-CM

## 2025-03-07 DIAGNOSIS — Z96.642 STATUS POST TOTAL HIP REPLACEMENT, LEFT: Primary | ICD-10-CM

## 2025-03-07 PROCEDURE — 99024 POSTOP FOLLOW-UP VISIT: CPT | Performed by: ORTHOPAEDIC SURGERY

## 2025-03-07 PROCEDURE — 97140 MANUAL THERAPY 1/> REGIONS: CPT

## 2025-03-07 PROCEDURE — 97110 THERAPEUTIC EXERCISES: CPT

## 2025-03-07 RX ORDER — CELECOXIB 200 MG/1
200 CAPSULE ORAL DAILY
Qty: 30 CAPSULE | Refills: 0 | Status: SHIPPED | OUTPATIENT
Start: 2025-03-07 | End: 2025-04-06

## 2025-03-07 RX ORDER — CYCLOBENZAPRINE HCL 10 MG
10 TABLET ORAL
Qty: 20 TABLET | Refills: 0 | Status: SHIPPED | OUTPATIENT
Start: 2025-03-07

## 2025-03-07 NOTE — PROGRESS NOTES
Name: Neptali Elias      : 1965      MRN: [id number]  Encounter Provider: Rosalia Higuera DO  Encounter Date: 3/7/2025   Encounter department: [facility]  :  Assessment & Plan  Status post total hip replacement, left    Orders:    celecoxib (CeleBREX) 200 mg capsule; Take 1 capsule (200 mg total) by mouth daily    Ambulatory referral to Physical Therapy; Future    cyclobenzaprine (FLEXERIL) 10 mg tablet; Take 1 tablet (10 mg total) by mouth daily at bedtime          Patient is 5 weeks status post left ADDISON  Continue PT 3x per week.  Prescription was sent to PT to let them know it is okay to use a massage machine the patient is requesting.  Pain control prn-oxycodone 1-2 times a day.  He does not want any more refill.  He did ask for a couple more Flexeril and a refill on Celebrex which I refilled.  Patient can call ahead for abx prior to dental appts.  Follow-up approximately 6 weeks for 11-week postop left ADDISON.    No follow-ups on file.    I answered all of the patient's questions during the visit and provided education of the patient's condition during the visit.  The patient verbalized understanding of the information given and agrees with the plan.  This note was dictated using [employer] software.  It may contain errors including improperly dictated words.  Please contact physician directly for any questions.    History of Present Illness   HPI  Chief Complaint:   Chief Complaint   Patient presents with    Left Hip - Post-op, Follow-up       Neptali Elias is a 59 y.o. male who presents for 5 week follow up s/p left ADDISON.  He is requesting refill on the Flexeril and Celebrex as he has swelling.  He is asking for approval to use a massage machine to physical therapy as well.  He is ambulating with a cane.    Review of Systems  ROS:    See HPI for musculoskeletal review.   All other systems reviewed are negative     History:  Past Medical  History:   Diagnosis Date    Colon polyp     GERD (gastroesophageal reflux disease)     Hyperlipidemia     Hypertension     Sleep apnea      Past Surgical History:   Procedure Laterality Date    ACHILLES TENDON REPAIR Right     1983 in [place]    BACK SURGERY  2009    herniated L5-S1 disc repair    COLONOSCOPY      ND ARTHRP ACETBLR/PROX FEM PROSTC AGRFT/ALGRFT Left 2/4/2025    Procedure: ARTHROPLASTY HIP TOTAL ANTERIOR,NAVIGATED, potential same day discharge;  Surgeon: Rosalia Higuera DO;  Location:  MAIN OR;  Service: Orthopedics     Social History   Social History     Substance and Sexual Activity   Alcohol Use Not Currently    Comment: used to drink 2-3x/week 1 bottle of wine     Social History     Substance and Sexual Activity   Drug Use Not Currently    Types: Marijuana    Comment: THC 3 times a week     Social History     Tobacco Use   Smoking Status Some Days    Types: Cigars, Cigarettes   Smokeless Tobacco Never   Tobacco Comments    1 cigar a day     Family History:   Family History   Problem Relation Age of Onset    Hypertension Mother     Hyperlipidemia Mother     Prostate cancer Father     Hyperlipidemia Father     Stroke Father     Diabetes type II Brother     Hypertension Brother     Prostate cancer Brother        Current Outpatient Medications on File Prior to Visit   Medication Sig Dispense Refill    acetaminophen (TYLENOL) 500 mg tablet Take 2 tablets (1,000 mg total) by mouth every 8 (eight) hours 90 tablet 0    amLODIPine (NORVASC) 10 mg tablet TAKE 1 TABLET BY MOUTH EVERY DAY 90 tablet 1    ascorbic acid (VITAMIN C) 500 mg tablet TAKE 1 TABLET (500 MG TOTAL) BY MOUTH DAILY BEGIN 30 DAYS PRIOR TO SURGERY 30 tablet 1    aspirin (Aspirin 81) 81 mg EC tablet Take 1 tablet (81 mg total) by mouth every 12 (twelve) hours Take with food for blood clot prevention 84 tablet 0    atorvastatin (LIPITOR) 80 mg tablet Take 80 mg by mouth daily      b complex vitamins capsule Take 1 capsule by mouth  "daily      Blood Pressure KIT Use 2 (two) times a day 1 kit 0    celecoxib (CeleBREX) 200 mg capsule Take 1 capsule (200 mg total) by mouth 2 (two) times a day 60 capsule 0    cyclobenzaprine (FLEXERIL) 10 mg tablet Take 1 tablet (10 mg total) by mouth daily at bedtime as needed for muscle spasms 20 tablet 0    docusate sodium (COLACE) 100 mg capsule Take 1 capsule (100 mg total) by mouth 2 (two) times a day 30 capsule 0    ferrous sulfate 324 (65 Fe) mg Take 1 tablet (324 mg total) by mouth daily before breakfast Begin 30 days prior to surgery 30 tablet 1    folic acid (KP Folic Acid) 1 mg tablet Take 1 tablet (1 mg total) by mouth daily Begin 30 days prior to surgery 30 tablet 1    gabapentin (Neurontin) 100 mg capsule Take 1 capsule (100 mg total) by mouth 3 (three) times a day 90 capsule 0    losartan (Cozaar) 50 mg tablet Take 1 tablet (50 mg total) by mouth daily 30 tablet 5    Multiple Vitamin (multivitamin) tablet Take 1 tablet by mouth daily Begin 30 days prior to surgery 30 tablet 1    omeprazole (PriLOSEC) 20 mg delayed release capsule Take 20 mg by mouth daily      oxyCODONE-acetaminophen (Percocet) 5-325 mg per tablet Take 1 tablet by mouth every 4 (four) hours as needed for severe pain Continue current therapy. Discontinue oxycodone 5 mg and tylenol 500 mg. Max Daily Amount: 6 tablets 30 tablet 0    promethazine (PHENERGAN) 12.5 MG tablet Take 1 tablet (12.5 mg total) by mouth every 6 (six) hours as needed for nausea or vomiting 12 tablet 0    Wegovy 1 MG/0.5ML Inject 1 mg under the skin once a week       Current Facility-Administered Medications on File Prior to Visit   Medication Dose Route Frequency Provider Last Rate Last Admin    ropivacaine (NAROPIN) injection 4 mL  4 mL Intra-articular Titrated    4 mL at 07/22/24 1446     No Known Allergies     Objective   Ht 5' 9\" (1.753 m)   Wt 92.3 kg (203 lb 6.4 oz)   BMI 30.04 kg/m²         PE:  AAOx 3  WDWN  Hearing intact, no drainage from eyes  no " audible wheezing  no abdominal distension  LE compartments soft, AT/GS intact    Ortho Exam:  left hip:   INC: C/D/I, No erythema, mild swelling  No pain with ROM

## 2025-03-07 NOTE — PROGRESS NOTES
"Daily Note     Today's date: 3/7/2025  Patient name: Neptali Elias  : 1965  MRN: [id number]  Referring provider: Rosalia Higuera,*  Dx:   Encounter Diagnosis     ICD-10-CM    1. Primary osteoarthritis of one hip, left  M16.12       2. Preoperative testing  Z01.818                      Subjective: Pt presents to PT reporting the swelling in L hip decreased and reports decreased soreness.  Pt reports MD follow up this morning.       Objective: See treatment diary below      Assessment: Pt demonstrates improved tolerance progressions with no complaints. Pt continues to have difficulty with SLR secondary to soreness in L anterior proximal quad.  Pt continues to have mild quad lag that improves with cuing. Pt reports good response with PROM noting improved mobility. Patient demonstrated fatigue post treatment, exhibited good technique with therapeutic exercises, and would benefit from continued PT to increase flexibility, strength and function.    Plan: Continue per plan of care.      Precautions: s/p anterior L ADDISON on 25.   Avoid hip extension, adduction, and external rotation for first 6 weeks    Date 3/5 3/7    2/19 2/21 2/24 2/26 3/3   Visit #  12    6 7 8 9 10   FOTO         54/47    Re-eval               Manuals 3/5 3/7    2/19 2/21 2/24 2/26 3/3   L hip PROM PK PK    DK DK ND ND  DK                                          Neuro Re-Ed 3/5 3/7    2/19 2/21 2/24 2/26 3/3   Quad sets             Glute sets nv 2x10 3\" bridges    2x10 3\" h/l 2x10 3\" h/l 2x15 3\" mini glute bridges 2x10 3\" mini bridges 2x10 3\" bridges   Heel slides             Clamshells       2x10 3\" OTB L s/l np     SAQ      2x10 2# L 20x3\" 2# L 2x10 2x10 2# L    LAQ 2x10 2.5# L nv        2x10 2.5# L   SLS      2x30\" airex nv 36\", 52\", 54\" L airex   nv                 Ther Ex 3/5 3/7    2/19 2/21 2/24 2/26 3/3   bike 6' full 6' full    nv 6' full fwd 6' full frwd 6 full frwd 6' full   SLR 10x 2.5#, 10x 0# L 2.5# 2 x 10    " "2x10 w QS 2x10 w QS 2x10 w/ QS AA 2x10 2# 2x10 2.5# L   S/l hip abd 2x10 2.5# L 2x10 2.5# L    2x10 L 2# 2x10 L 2# 2x10 L 2# 2x10 2# L 2x10 2.5# L   Standing hip abd/flex 2x10 ea abd 2x10 ea abd    2x10 ea abd PTB 2x10 PTB ea abd np  2x10 ea abd   marches Purp 2 x 10 ea Purp 2 x 10 ea    2x10 ea PTB 2x10 ea PTB 2x10 PTB 2x15 OTB 2x10 BTB ea   AA hip flex w/ ball nv 15x5\" RSB      15x5\" RPB 15x5\" RPB 15x5\" RSB   Leg press nv     2x15 85# BLE, s/l nv 2x15 95# BLE, 2x10 55# LLE 3x10 95# BLE 3x10  2x15 105# BLE, 2x10 65# LLE   HS str             sidestepping nv     nv 4 laps PTB 4 laps PTB  4 laps BTB 4 laps BTB    Monster walks nv      3 laps PTB 4 laps PTB 4 laps BTB 4 laps BTB                              Ther Activity 3/5 3/7    2/19 2/21 2/24 2/26 3/3   Step ups 2x10 L up + over      2x10 L up 1R no UE 2x10 L up 1R no UE 2x20 L up 1R no UE 2x20 L up 1R 2x10 L up + over    Squats 2 x 10 2 x 10     2x10 2x10 2x10  1x10 to chair 2x10 to chair   Gait Training 3/5 3/7    2/19 2/24                                Modalities 3/5     2/19 2/24  2/26 3/3   ice  10' post     10' post  10' post 10' post                                         "

## 2025-03-10 ENCOUNTER — APPOINTMENT (OUTPATIENT)
Dept: PHYSICAL THERAPY | Facility: CLINIC | Age: 60
End: 2025-03-10
Payer: COMMERCIAL

## 2025-03-12 ENCOUNTER — APPOINTMENT (OUTPATIENT)
Dept: PHYSICAL THERAPY | Facility: CLINIC | Age: 60
End: 2025-03-12
Payer: COMMERCIAL

## 2025-04-15 ENCOUNTER — TELEPHONE (OUTPATIENT)
Dept: PREADMISSION TESTING | Facility: HOSPITAL | Age: 60
End: 2025-04-15

## 2025-04-16 ENCOUNTER — EVALUATION (OUTPATIENT)
Dept: PHYSICAL THERAPY | Facility: CLINIC | Age: 60
End: 2025-04-16
Payer: COMMERCIAL

## 2025-04-16 DIAGNOSIS — Z96.642 STATUS POST TOTAL HIP REPLACEMENT, LEFT: ICD-10-CM

## 2025-04-16 PROCEDURE — 97110 THERAPEUTIC EXERCISES: CPT

## 2025-04-16 PROCEDURE — 97161 PT EVAL LOW COMPLEX 20 MIN: CPT

## 2025-04-16 PROCEDURE — 97112 NEUROMUSCULAR REEDUCATION: CPT

## 2025-04-16 NOTE — PROGRESS NOTES
PT Evaluation     Today's date: 2025  Patient name: Neptali Elias  : 1965  MRN: [id number]  Referring provider: Hiro Hunter PA*  Dx:   Encounter Diagnosis     ICD-10-CM    1. Status post total hip replacement, left  Z96.642 Ambulatory referral to Physical Therapy          Start Time: 1000  Stop Time: 1040  Total time in clinic (min): 40 minutes    Assessment  Impairments: abnormal muscle firing, abnormal or restricted ROM, activity intolerance, impaired physical strength, lacks appropriate home exercise program, pain with function and poor body mechanics  Symptom irritability: moderate    Assessment details: Pt is a 59 y.o. year old male presenting to physical therapy for status post left total hip replacement on 2025. He presents with the following impairments: decreased hip ROM in all directions in left hip, decreased strength and endurance in all left hip and knee musculature, increased tension in all left hamstring, ITB, quad, and piriformis musculature, gait dysfunction, and poor squatting mechanics affecting his function with ransferring from sitting to standing, walking, squatting, lifting, and prolonged weight barring tasks. Pt will benefit from skilled physical therapy to address functional limitations noted in evaluation and meet patient goals.   Understanding of Dx/Px/POC: good     Prognosis: good    Goals  ST. Pt will be independent with HEP.  2. Pt will improve left hip mobility deficits by 50%   3. Pt will improve FOTO score from baseline set during initial evaluation  LT. Pt will be able to squat 10 times with proper form and no increase in symptoms  2. Pt will improve left hip strength grossly by 2 grades or more  3. Pt will be able to transfer from sitting to standing and walk immediatly after without increase in symptoms  4. Pt will reach FOTO goal set by howie during initial evaluation     Plan  Patient would benefit from: PT eval and skilled physical  therapy  Planned modality interventions: biofeedback, manual electrical stimulation, microcurrent electrical stimulation, TENS, electrical stimulation/Russian stimulation, thermotherapy: hydrocollator packs, cryotherapy and unattended electrical stimulation    Planned therapy interventions: abdominal trunk stabilization, joint mobilization, manual therapy, massage, ADL retraining, neuromuscular re-education, body mechanics training, patient education, postural training, strengthening, stretching, therapeutic activities, therapeutic exercise, flexibility, functional ROM exercises and home exercise program    Frequency: 2x week  Duration in weeks: 6  Treatment plan discussed with: patient        Subjective Evaluation    History of Present Illness  Mechanism of injury: Pt is a 59 y.o. male presenting with chronic right hip pain following ADDISON on 2025. Pt was coming to PT for the first month after surgery, but due to a death in his family he needed to stop.Pt denies that he has been having any pain, just difficulty with several movements. Pt reports that transferring from sitting to standing, walking, squatting, lifting, and prolonged weight barring tasks are the most difficult movements for them to perfrom.Pt stated that their main goals for therapy are to improve function with transferring from sitting to standing, walking, squatting, lifting, and prolonged weight barring tasks.  Quality of life: good    Patient Goals  Patient goals for therapy: decreased edema, decreased pain, improved balance, increased motion, increased strength and independence with ADLs/IADLs    Pain  Current pain ratin  At best pain ratin  At worst pain ratin  Aggravating factors: walking and lifting          Objective     Active Range of Motion   Left Hip   Flexion: 104 degrees   Abduction: 30 degrees   External rotation (90/90): 30 degrees   Internal rotation (90/90): 23 degrees     Right Hip   Flexion: 115 degrees   Abduction:  "30 degrees   External rotation (90/90): 45 degrees   Internal rotation (90/90): 35 degrees     Joint Play   Left Hip   Hypomobile in the posterior hip capsule, anterior hip capsule and lateral hip capsule.    Right Hip   Joints within functional limits are the posterior hip capsule, anterior hip capsule, long axis distraction and long axis distraction.     Strength/Myotome Testing     Left Hip   Planes of Motion   Flexion: 3+  Extension: 3+  Abduction: 3+  External rotation: 4-  Internal rotation: 4-    Right Hip   Planes of Motion   Flexion: 5  Extension: 5  Abduction: 5  External rotation: 5  Internal rotation: 5    Left Knee   Flexion: 4  Extension: 4    Right Knee   Flexion: 5  Extension: 5    Left Ankle/Foot   Dorsiflexion: 5  Plantar flexion: 5    Right Ankle/Foot   Dorsiflexion: 5  Plantar flexion: 5    Tests     Lumbar     Left   Negative crossed SLR and passive SLR.     Right   Negative crossed SLR, passive SLR and quadrant.     Right Pelvic Girdle/Sacrum   Negative: thigh thrust.     Right Hip   Negative YARA and FADIR.     Additional Tests Details  Left LE special tests not perfromed due to pt being 10 weeks post op    General Comments:      Hip Comments   Gait: decreased gait speed, asymmetric step length on left, weight shifted to the right,   Squatting: weight slightly forward, good depth .   Increased tension in left hamstring, quad, ITB, and piriformis  Good activation of b/l quad             Precautions: s/p anterior L ADDISON on 2/4/25.       Date 4/16            Visit # IE            FOTO IE             Re-eval IE               Manuals 4/16            Hip PROM NV            LE Stretching             Quad/ITB STM NV                         Neuro Re-Ed 4/16            Hamstring Str 2x30\" L            Hip Flexor Str             ITB Str             Bridges 15x3\" GTB            Clamshells 15x 3\" L GTB            Resisted Marching NV            Hamstring Curl NV            TKE             Slantboard        " "     SLS             Ther Ex 4/16            Bike             Heel Slides 10x3\" L            SLR 10x3\" L            SL Hip Abd 10x3\" L            Leg Press NV            SL Leg Press VN            Side Stepping NV            Monster Walks NV            3-way Hip kicks NV            Split Squats             Ther Activity 4/16            Squatting NV            Sit to Stands NV            Lateral Step Ups NV            Step-Ups NV            Gait Training 4/16                                      Modalities 4/16                                              "

## 2025-04-16 NOTE — LETTER
2025    Hiro Hunter PA-C  [address] [place] [phone]    Patient: Neptali Elias   YOB: 1965   Date of Visit: 2025     Encounter Diagnosis     ICD-10-CM    1. Status post total hip replacement, left  Z96.642 Ambulatory referral to Physical Therapy          Dear Dr. Hiro Hunter PA-C:    Thank you for your recent referral of Neptali Elias. Please review the attached evaluation summary from Neptali's recent visit.     Please verify that you agree with the plan of care by signing the attached order.     If you have any questions or concerns, please do not hesitate to call.     I sincerely appreciate the opportunity to share in the care of one of your patients and hope to have another opportunity to work with you in the near future.       Sincerely,    Jorge Ashraf, PT      Referring Provider:      I certify that I have read the below Plan of Care and certify the need for these services furnished under this plan of treatment while under my care.                    Hiro Hunter PA-C  [address] [place] [id number]  Via In Basket          PT Evaluation     Today's date: 2025  Patient name: Neptali Elias  : 1965  MRN: [id number]  Referring provider: Hiro Hunter PA*  Dx:   Encounter Diagnosis     ICD-10-CM    1. Status post total hip replacement, left  Z96.642 Ambulatory referral to Physical Therapy          Start Time: 1000  Stop Time: 1040  Total time in clinic (min): 40 minutes    Assessment  Impairments: abnormal muscle firing, abnormal or restricted ROM, activity intolerance, impaired physical strength, lacks appropriate home exercise program, pain with function and poor body mechanics  Symptom irritability: moderate    Assessment details: Pt is a 59 y.o. year old male presenting to physical therapy for status post left total hip replacement on 2025. He presents with  the following impairments: decreased hip ROM in all directions in left hip, decreased strength and endurance in all left hip and knee musculature, increased tension in all left hamstring, ITB, quad, and piriformis musculature, gait dysfunction, and poor squatting mechanics affecting his function with ransferring from sitting to standing, walking, squatting, lifting, and prolonged weight barring tasks. Pt will benefit from skilled physical therapy to address functional limitations noted in evaluation and meet patient goals.   Understanding of Dx/Px/POC: good     Prognosis: good    Goals  ST. Pt will be independent with HEP.  2. Pt will improve left hip mobility deficits by 50%   3. Pt will improve FOTO score from baseline set during initial evaluation  LT. Pt will be able to squat 10 times with proper form and no increase in symptoms  2. Pt will improve left hip strength grossly by 2 grades or more  3. Pt will be able to transfer from sitting to standing and walk immediatly after without increase in symptoms  4. Pt will reach FOTO goal set by howie during initial evaluation     Plan  Patient would benefit from: PT eval and skilled physical therapy  Planned modality interventions: biofeedback, manual electrical stimulation, microcurrent electrical stimulation, TENS, electrical stimulation/Russian stimulation, thermotherapy: hydrocollator packs, cryotherapy and unattended electrical stimulation    Planned therapy interventions: abdominal trunk stabilization, joint mobilization, manual therapy, massage, ADL retraining, neuromuscular re-education, body mechanics training, patient education, postural training, strengthening, stretching, therapeutic activities, therapeutic exercise, flexibility, functional ROM exercises and home exercise program    Frequency: 2x week  Duration in weeks: 6  Treatment plan discussed with: patient        Subjective Evaluation    History of Present Illness  Mechanism of injury: Pt is shanthi  59 y.o. male presenting with chronic right hip pain following ADDISON on 2025. Pt was coming to PT for the first month after surgery, but due to a death in his family he needed to stop.Pt denies that he has been having any pain, just difficulty with several movements. Pt reports that transferring from sitting to standing, walking, squatting, lifting, and prolonged weight barring tasks are the most difficult movements for them to perfrom.Pt stated that their main goals for therapy are to improve function with transferring from sitting to standing, walking, squatting, lifting, and prolonged weight barring tasks.  Quality of life: good    Patient Goals  Patient goals for therapy: decreased edema, decreased pain, improved balance, increased motion, increased strength and independence with ADLs/IADLs    Pain  Current pain ratin  At best pain ratin  At worst pain ratin  Aggravating factors: walking and lifting          Objective     Active Range of Motion   Left Hip   Flexion: 104 degrees   Abduction: 30 degrees   External rotation (90/90): 30 degrees   Internal rotation (90/90): 23 degrees     Right Hip   Flexion: 115 degrees   Abduction: 30 degrees   External rotation (90/90): 45 degrees   Internal rotation (90/90): 35 degrees     Joint Play   Left Hip   Hypomobile in the posterior hip capsule, anterior hip capsule and lateral hip capsule.    Right Hip   Joints within functional limits are the posterior hip capsule, anterior hip capsule, long axis distraction and long axis distraction.     Strength/Myotome Testing     Left Hip   Planes of Motion   Flexion: 3+  Extension: 3+  Abduction: 3+  External rotation: 4-  Internal rotation: 4-    Right Hip   Planes of Motion   Flexion: 5  Extension: 5  Abduction: 5  External rotation: 5  Internal rotation: 5    Left Knee   Flexion: 4  Extension: 4    Right Knee   Flexion: 5  Extension: 5    Left Ankle/Foot   Dorsiflexion: 5  Plantar flexion: 5    Right Ankle/Foot  "  Dorsiflexion: 5  Plantar flexion: 5    Tests     Lumbar     Left   Negative crossed SLR and passive SLR.     Right   Negative crossed SLR, passive SLR and quadrant.     Right Pelvic Girdle/Sacrum   Negative: thigh thrust.     Right Hip   Negative YARA and DELILAH.     Additional Tests Details  Left LE special tests not perfromed due to pt being 10 weeks post op    General Comments:      Hip Comments   Gait: decreased gait speed, asymmetric step length on left, weight shifted to the right,   Squatting: weight slightly forward, good depth .   Increased tension in left hamstring, quad, ITB, and piriformis  Good activation of b/l quad             Precautions: s/p anterior L ADDISON on 2/4/25.       Date 4/16            Visit # IE            FOTO IE             Re-eval IE               Manuals 4/16            Hip PROM NV            LE Stretching             Quad/ITB STM NV                         Neuro Re-Ed 4/16            Hamstring Str 2x30\" L            Hip Flexor Str             ITB Str             Bridges 15x3\" GTB            Clamshells 15x 3\" L GTB            Resisted Marching NV            Hamstring Curl NV            TKE             Slantboard             SLS             Ther Ex 4/16            Bike             Heel Slides 10x3\" L            SLR 10x3\" L            SL Hip Abd 10x3\" L            Leg Press NV            SL Leg Press VN            Side Stepping NV            Monster Walks NV            3-way Hip kicks NV            Split Squats             Ther Activity 4/16            Squatting NV            Sit to Stands NV            Lateral Step Ups NV            Step-Ups NV            Gait Training 4/16                                      Modalities 4/16                                                              "

## 2025-04-23 ENCOUNTER — OFFICE VISIT (OUTPATIENT)
Dept: PHYSICAL THERAPY | Facility: CLINIC | Age: 60
End: 2025-04-23
Attending: PHYSICIAN ASSISTANT
Payer: COMMERCIAL

## 2025-04-23 DIAGNOSIS — Z96.642 STATUS POST TOTAL HIP REPLACEMENT, LEFT: Primary | ICD-10-CM

## 2025-04-23 PROCEDURE — 97110 THERAPEUTIC EXERCISES: CPT

## 2025-04-23 PROCEDURE — 97112 NEUROMUSCULAR REEDUCATION: CPT

## 2025-04-23 NOTE — PROGRESS NOTES
"Daily Note     Today's date: 2025  Patient name: Neptali Elias  : 1965  MRN: [id number]  Referring provider: Hiro Hunter PA*  Dx:   Encounter Diagnosis     ICD-10-CM    1. Status post total hip replacement, left  Z96.642           Start Time: 1030  Stop Time: 1110  Total time in clinic (min): 40 minutes    Subjective: Pt reports that he is feeling good overall with minimal pain or discomfort in his hip noted coming into todaya's session.       Objective: See treatment diary below      Assessment: Pt tolerated treatment well. Utilized stationary bike as warm up during today's session to attempt to improve the overall blood flow and begin activation of all LE musculature. Added squatting, sit to stands, forward step ups, lateral step ups, side-stepping, monster walks, and 3-way hip kicks during today's session to improve the overall strength and endurance in all hip, LE, and posterior chain musculature. Pt needed moderate cueing for form corrections and several rest breaks between sets due to mild increased fatigue. Once rest breaks were taken and pt was cued, he was able to compete remaining sets and reps with proper form and appropriate levels of fatigue post session. Patient exhibited good technique with therapeutic exercises and would benefit from continued PT      Plan: Continue per plan of care.  Progress treatment as tolerated.       Precautions: s/p anterior L ADDISON on 25.       Date            Visit # IE 2           FOTO IE             Re-eval IE               Manuals            Hip PROM NV            LE Stretching             Quad/ITB STM NV                         Neuro Re-Ed            Hamstring Str 2x30\" L            Hip Flexor Str             ITB Str             Bridges 15x3\" GTB            Clamshells 15x 3\" L GTB            Resisted Marching NV            Hamstring Curl NV            TKE             Slantboard             SLS             Ther " "Ex 4/16 4/23           Bike  6'           Heel Slides 10x3\" L            SLR 10x3\" L            SL Hip Abd 10x3\" L            Leg Press NV 3x10 95#           SL Leg Press VN 3x10 45#           Side Stepping NV 5 laps GTB           Monster Walks NV 5 laps GTB           3-way Hip kicks NV 20x ea GTB           Split Squats             Ther Activity 4/16 4/23           Squatting NV 20x           Sit to Stands NV 20x            Lateral Step Ups NV 20x ea 1R           Step-Ups NV 20x ea 1R           Gait Training 4/16                                      Modalities 4/16                                              "

## 2025-04-28 ENCOUNTER — ANESTHESIA (OUTPATIENT)
Dept: GASTROENTEROLOGY | Facility: HOSPITAL | Age: 60
End: 2025-04-28
Payer: COMMERCIAL

## 2025-04-28 ENCOUNTER — HOSPITAL ENCOUNTER (OUTPATIENT)
Dept: GASTROENTEROLOGY | Facility: HOSPITAL | Age: 60
Setting detail: OUTPATIENT SURGERY
Discharge: HOME | End: 2025-04-28
Attending: INTERNAL MEDICINE
Payer: COMMERCIAL

## 2025-04-28 ENCOUNTER — ANESTHESIA EVENT (OUTPATIENT)
Dept: GASTROENTEROLOGY | Facility: HOSPITAL | Age: 60
End: 2025-04-28
Payer: COMMERCIAL

## 2025-04-28 VITALS
OXYGEN SATURATION: 100 % | TEMPERATURE: 97.4 F | RESPIRATION RATE: 14 BRPM | SYSTOLIC BLOOD PRESSURE: 143 MMHG | DIASTOLIC BLOOD PRESSURE: 77 MMHG | HEART RATE: 63 BPM

## 2025-04-28 DIAGNOSIS — Z86.0100 HISTORY OF COLONIC POLYPS: ICD-10-CM

## 2025-04-28 DIAGNOSIS — M16.12 PRIMARY OSTEOARTHRITIS OF ONE HIP, LEFT: ICD-10-CM

## 2025-04-28 PROCEDURE — 45378 DIAGNOSTIC COLONOSCOPY: CPT | Performed by: INTERNAL MEDICINE

## 2025-04-28 RX ORDER — SODIUM CHLORIDE, SODIUM LACTATE, POTASSIUM CHLORIDE, CALCIUM CHLORIDE 600; 310; 30; 20 MG/100ML; MG/100ML; MG/100ML; MG/100ML
100 INJECTION, SOLUTION INTRAVENOUS CONTINUOUS
Status: DISCONTINUED | OUTPATIENT
Start: 2025-04-28 | End: 2025-05-02 | Stop reason: HOSPADM

## 2025-04-28 RX ORDER — SODIUM CHLORIDE, SODIUM LACTATE, POTASSIUM CHLORIDE, CALCIUM CHLORIDE 600; 310; 30; 20 MG/100ML; MG/100ML; MG/100ML; MG/100ML
100 INJECTION, SOLUTION INTRAVENOUS CONTINUOUS
Status: CANCELLED | OUTPATIENT
Start: 2025-04-28

## 2025-04-28 RX ORDER — PROPOFOL 10 MG/ML
INJECTION, EMULSION INTRAVENOUS AS NEEDED
Status: DISCONTINUED | OUTPATIENT
Start: 2025-04-28 | End: 2025-04-28

## 2025-04-28 RX ORDER — LIDOCAINE HYDROCHLORIDE 10 MG/ML
INJECTION, SOLUTION EPIDURAL; INFILTRATION; INTRACAUDAL; PERINEURAL AS NEEDED
Status: DISCONTINUED | OUTPATIENT
Start: 2025-04-28 | End: 2025-04-28

## 2025-04-28 RX ADMIN — SODIUM CHLORIDE, SODIUM LACTATE, POTASSIUM CHLORIDE, AND CALCIUM CHLORIDE 100 ML/HR: .6; .31; .03; .02 INJECTION, SOLUTION INTRAVENOUS at 07:18

## 2025-04-28 RX ADMIN — PROPOFOL 50 MG: 10 INJECTION, EMULSION INTRAVENOUS at 07:47

## 2025-04-28 RX ADMIN — SODIUM CHLORIDE, SODIUM LACTATE, POTASSIUM CHLORIDE, AND CALCIUM CHLORIDE: .6; .31; .03; .02 INJECTION, SOLUTION INTRAVENOUS at 07:40

## 2025-04-28 RX ADMIN — PROPOFOL 50 MG: 10 INJECTION, EMULSION INTRAVENOUS at 07:50

## 2025-04-28 RX ADMIN — Medication 40 MG: at 07:49

## 2025-04-28 RX ADMIN — PROPOFOL 50 MG: 10 INJECTION, EMULSION INTRAVENOUS at 07:53

## 2025-04-28 RX ADMIN — LIDOCAINE HYDROCHLORIDE 50 MG: 10 INJECTION, SOLUTION EPIDURAL; INFILTRATION; INTRACAUDAL at 07:45

## 2025-04-28 RX ADMIN — PROPOFOL 100 MG: 10 INJECTION, EMULSION INTRAVENOUS at 07:45

## 2025-04-28 RX ADMIN — PROPOFOL 50 MG: 10 INJECTION, EMULSION INTRAVENOUS at 07:56

## 2025-04-28 NOTE — ANESTHESIA POSTPROCEDURE EVALUATION
Post-Op Assessment Note    CV Status:  Stable  Pain Score: 0    Pain management: adequate       Mental Status:  Alert and awake   Hydration Status:  Euvolemic   PONV Controlled:  Controlled   Airway Patency:  Patent     Post Op Vitals Reviewed: Yes    No anethesia notable event occurred.    Staff: Anesthesiologist, CRNA       Last Filed PACU Vitals:  Vitals Value Taken Time   Temp 97.4 °F (36.3 °C) 04/28/25 0805   Pulse 65 04/28/25 0805   BP     Resp 14 04/28/25 0805   SpO2 100 % 04/28/25 0805       Modified Yisel:     Vitals Value Taken Time   Activity 1 04/28/25 0805   Respiration 2 04/28/25 0805   Circulation 1 04/28/25 0805   Consciousness 1 04/28/25 0805   Oxygen Saturation 2 04/28/25 0805     Modified Yisel Score: 7

## 2025-04-28 NOTE — H&P
History and Physical - [facility]  Neptali Elias 59 y.o. male MRN: [id number]                  HPI: Neptali Elias is a 59 y.o. year old male who presents for colonoscopy. Last colonoscopy 6 months ago with removal of polyps. Recommended repeat in 6 months due to inadequate prep.       REVIEW OF SYSTEMS: Per the HPI, and otherwise unremarkable.    Historical Information   Past Medical History:   Diagnosis Date    Colon polyp     CVA (cerebral vascular accident) (HCC)     GERD (gastroesophageal reflux disease)     Hyperlipidemia     Hypertension     Sleep apnea      Past Surgical History:   Procedure Laterality Date    ACHILLES TENDON REPAIR Right     1983 in [place]    BACK SURGERY  2009    herniated L5-S1 disc repair    COLONOSCOPY      OR ARTHRP ACETBLR/PROX FEM PROSTC AGRFT/ALGRFT Left 2/4/2025    Procedure: ARTHROPLASTY HIP TOTAL ANTERIOR,NAVIGATED, potential same day discharge;  Surgeon: Rosalia Higuera DO;  Location:  MAIN OR;  Service: Orthopedics     Social History   Social History     Substance and Sexual Activity   Alcohol Use Not Currently    Comment: used to drink 2-3x/week 1 bottle of wine     Social History     Substance and Sexual Activity   Drug Use Not Currently    Types: Marijuana    Comment: not using anymore     Social History     Tobacco Use   Smoking Status Some Days    Types: Cigars, Cigarettes   Smokeless Tobacco Never   Tobacco Comments    1 cigar a day     Family History   Problem Relation Age of Onset    Hypertension Mother     Hyperlipidemia Mother     Prostate cancer Father     Hyperlipidemia Father     Stroke Father     Diabetes type II Brother     Hypertension Brother     Prostate cancer Brother        Meds/Allergies       Current Outpatient Medications:     amLODIPine (NORVASC) 10 mg tablet    aspirin (Aspirin 81) 81 mg EC tablet    atorvastatin (LIPITOR) 80 mg tablet    Blood Pressure KIT    losartan (Cozaar) 50 mg tablet    omeprazole  (PriLOSEC) 20 mg delayed release capsule    acetaminophen (TYLENOL) 500 mg tablet    ascorbic acid (VITAMIN C) 500 mg tablet    b complex vitamins capsule    folic acid (KP Folic Acid) 1 mg tablet    Multiple Vitamin (multivitamin) tablet    Wegovy 1 MG/0.5ML    Current Facility-Administered Medications:     lactated ringers infusion, 100 mL/hr, Intravenous, Continuous, Continue from Pre-op at 04/28/25 0721    ropivacaine (NAROPIN) injection 4 mL, 4 mL, Intra-articular, Titrated, 4 mL at 07/22/24 1446    No Known Allergies    Objective     /74   Pulse 76   Temp 98.2 °F (36.8 °C) (Temporal)   Resp 16   SpO2 98%       PHYSICAL EXAM    Gen: NAD  Head: NCAT  CV: RRR  CHEST: Clear  ABD: soft, NT/ND  EXT: no edema      ASSESSMENT/PLAN:  This is a 59 y.o. year old male here for colonoscopy, and he is stable and optimized for his procedure.

## 2025-04-28 NOTE — ANESTHESIA PREPROCEDURE EVALUATION
Medical History    History Comments   Hypertension    Hyperlipidemia    Colon polyp    GERD (gastroesophageal reflux disease)    Sleep apnea    CVA (cerebral vascular accident) (HCC)       Procedure:  COLONOSCOPY    Relevant Problems   ANESTHESIA (within normal limits)      CARDIO   (+) Calcification of right carotid artery   (+) Essential hypertension   (+) External hemorrhoids   (+) Mixed hyperlipidemia      GI/HEPATIC   (+) Gastroesophageal reflux disease   (+) Sliding hiatal hernia      MUSCULOSKELETAL   (+) Chronic midline low back pain   (+) Osteoarthritis   (+) Primary osteoarthritis of left hip   (+) Sliding hiatal hernia      NEURO/PSYCH   (+) Cerebrovascular accident (CVA) (HCC)   (+) Chronic midline low back pain      PULMONARY   (+) LOUIE (obstructive sleep apnea)        Physical Exam    Airway    Mallampati score: II  TM Distance: >3 FB  Neck ROM: full     Dental       Cardiovascular  Rate: normal    Pulmonary  Pulmonary exam normal     Other Findings  Per pt denies anything remaining that is loose or removeable      Anesthesia Plan  ASA Score- 3     Anesthesia Type- IV sedation with anesthesia with ASA Monitors.         Additional Monitors:     Airway Plan:     Comment: Per patient, appropriately NPO, denies active CP/SOB/wheezing/symptoms related to heartburn/nausea/vomiting  .       Plan Factors-Exercise tolerance (METS): >4 METS.    Chart reviewed.    Patient summary reviewed.    Patient is not a current smoker.              Induction- intravenous.    Postoperative Plan-         Informed Consent- Anesthetic plan and risks discussed with patient.  I personally reviewed this patient with the CRNA. Discussed and agreed on the Anesthesia Plan with the CRNA..      NPO Status:  Vitals Value Taken Time   Date of last liquid 04/28/25 04/28/25 0712   Time of last liquid 0100 04/28/25 0712   Date of last solid 04/26/25 04/28/25 0712   Time of last solid 1900 04/28/25 0712

## 2025-04-30 ENCOUNTER — APPOINTMENT (OUTPATIENT)
Dept: PHYSICAL THERAPY | Facility: CLINIC | Age: 60
End: 2025-04-30
Attending: PHYSICIAN ASSISTANT
Payer: COMMERCIAL

## 2025-05-01 ENCOUNTER — OFFICE VISIT (OUTPATIENT)
Dept: OBGYN CLINIC | Facility: MEDICAL CENTER | Age: 60
End: 2025-05-01

## 2025-05-01 VITALS — HEIGHT: 69 IN | BODY MASS INDEX: 29.27 KG/M2 | WEIGHT: 197.6 LBS

## 2025-05-01 DIAGNOSIS — Z96.642 STATUS POST TOTAL HIP REPLACEMENT, LEFT: ICD-10-CM

## 2025-05-01 DIAGNOSIS — Z96.642 AFTERCARE FOLLOWING LEFT HIP JOINT REPLACEMENT SURGERY: Primary | ICD-10-CM

## 2025-05-01 DIAGNOSIS — Z47.1 AFTERCARE FOLLOWING LEFT HIP JOINT REPLACEMENT SURGERY: Primary | ICD-10-CM

## 2025-05-01 PROCEDURE — 99024 POSTOP FOLLOW-UP VISIT: CPT | Performed by: ORTHOPAEDIC SURGERY

## 2025-05-01 RX ORDER — NALTREXONE HYDROCHLORIDE 50 MG/1
25 TABLET, FILM COATED ORAL DAILY
COMMUNITY
Start: 2025-04-22 | End: 2025-05-22

## 2025-05-01 RX ORDER — AMOXICILLIN 500 MG/1
2000 CAPSULE ORAL
Qty: 12 CAPSULE | Refills: 0 | Status: SHIPPED | OUTPATIENT
Start: 2025-05-01 | End: 2025-05-02

## 2025-05-01 NOTE — PROGRESS NOTES
Name: Neptali Elias      : 1965      MRN: [id number]  Encounter Provider: Rosalia Higuera DO  Encounter Date: 2025   Encounter department: [facility]  :  Assessment & Plan  Aftercare following left hip joint replacement surgery  Patient is 11 weeks status post L ADDISON  Transition to home exercises  Pain control prn- OTC pain meds  Follow-up 9 months for 1 year check left ADDISON with x-ray  Abx were given today for dental visits.        Status post total hip replacement, left    Orders:    amoxicillin (AMOXIL) 500 mg capsule; Take 4 capsules (2,000 mg total) by mouth 60 minutes pre-procedure for 1 day              Return in about 9 months (around 2026) for 1 YR FU L ADDISON.    I answered all of the patient's questions during the visit and provided education of the patient's condition during the visit.  The patient verbalized understanding of the information given and agrees with the plan.  This note was dictated using [employer] software.  It may contain errors including improperly dictated words.  Please contact physician directly for any questions.    History of Present Illness   HPI  Chief Complaint:   Chief Complaint   Patient presents with    Left Hip - Post-op, Follow-up       Neptali Elias is a 59 y.o. male who presents for 11 week follow up s/p left ADDISON.  Doing very well, very pleased w/ result     Review of Systems  ROS:    See HPI for musculoskeletal review.   All other systems reviewed are negative     History:  Past Medical History:   Diagnosis Date    Colon polyp     CVA (cerebral vascular accident) (HCC)     GERD (gastroesophageal reflux disease)     Hyperlipidemia     Hypertension     Sleep apnea      Past Surgical History:   Procedure Laterality Date    ACHILLES TENDON REPAIR Right      in [place]    BACK SURGERY  2009    herniated L5-S1 disc repair    COLONOSCOPY      CO ARTHRP ACETBLR/PROX FEM PROSTC AGRFT/ALGRFT Left  2/4/2025    Procedure: ARTHROPLASTY HIP TOTAL ANTERIOR,NAVIGATED, potential same day discharge;  Surgeon: Rosalia Higuera DO;  Location:  MAIN OR;  Service: Orthopedics     Social History   Social History     Substance and Sexual Activity   Alcohol Use Not Currently    Comment: used to drink 2-3x/week 1 bottle of wine     Social History     Substance and Sexual Activity   Drug Use Not Currently    Types: Marijuana    Comment: not using anymore     Social History     Tobacco Use   Smoking Status Some Days    Types: Cigars, Cigarettes   Smokeless Tobacco Never   Tobacco Comments    1 cigar a day     Family History:   Family History   Problem Relation Age of Onset    Hypertension Mother     Hyperlipidemia Mother     Prostate cancer Father     Hyperlipidemia Father     Stroke Father     Diabetes type II Brother     Hypertension Brother     Prostate cancer Brother        Current Outpatient Medications on File Prior to Visit   Medication Sig Dispense Refill    amLODIPine (NORVASC) 10 mg tablet TAKE 1 TABLET BY MOUTH EVERY DAY 90 tablet 1    atorvastatin (LIPITOR) 80 mg tablet Take 80 mg by mouth daily at bedtime      Blood Pressure KIT Use 2 (two) times a day 1 kit 0    losartan (Cozaar) 50 mg tablet Take 1 tablet (50 mg total) by mouth daily 30 tablet 5    naltrexone (REVIA) 50 mg tablet Take 25 mg by mouth daily      omeprazole (PriLOSEC) 20 mg delayed release capsule Take 20 mg by mouth daily      Wegovy 1 MG/0.5ML Inject 1 mg under the skin every 30 (thirty) days Pt takes monthly      acetaminophen (TYLENOL) 500 mg tablet Take 2 tablets (1,000 mg total) by mouth every 8 (eight) hours 90 tablet 0    ascorbic acid (VITAMIN C) 500 mg tablet TAKE 1 TABLET (500 MG TOTAL) BY MOUTH DAILY BEGIN 30 DAYS PRIOR TO SURGERY 30 tablet 1    aspirin (Aspirin 81) 81 mg EC tablet Take 1 tablet (81 mg total) by mouth every 12 (twelve) hours Take with food for blood clot prevention (Patient not taking: Reported on 5/1/2025) 84  "tablet 0    b complex vitamins capsule Take 1 capsule by mouth daily      folic acid ( Folic Acid) 1 mg tablet Take 1 tablet (1 mg total) by mouth daily Begin 30 days prior to surgery 30 tablet 1    Multiple Vitamin (multivitamin) tablet Take 1 tablet by mouth daily Begin 30 days prior to surgery 30 tablet 1     Current Facility-Administered Medications on File Prior to Visit   Medication Dose Route Frequency Provider Last Rate Last Admin    lactated ringers infusion  100 mL/hr Intravenous Continuous Lorenzo Beltran  mL/hr at 04/28/25 0718 New Bag at 04/28/25 0740    ropivacaine (NAROPIN) injection 4 mL  4 mL Intra-articular Titrated    4 mL at 07/22/24 1446     No Known Allergies     Objective   Ht 5' 9\" (1.753 m)   Wt 89.6 kg (197 lb 9.6 oz)   BMI 29.18 kg/m²         PE:  AAOx 3  WDWN  Hearing intact, no drainage from eyes  no audible wheezing  no abdominal distension  LE compartments soft, AT/GS intact    Ortho Exam:  left hip:   INC: C/D/I, No erythema, mild swelling  No pain with ROM                 "

## 2025-05-12 ENCOUNTER — APPOINTMENT (OUTPATIENT)
Dept: PHYSICAL THERAPY | Facility: CLINIC | Age: 60
End: 2025-05-12
Attending: PHYSICIAN ASSISTANT
Payer: COMMERCIAL

## 2025-05-14 ENCOUNTER — OFFICE VISIT (OUTPATIENT)
Dept: PHYSICAL THERAPY | Facility: CLINIC | Age: 60
End: 2025-05-14
Attending: PHYSICIAN ASSISTANT
Payer: COMMERCIAL

## 2025-05-14 DIAGNOSIS — Z96.642 STATUS POST TOTAL HIP REPLACEMENT, LEFT: Primary | ICD-10-CM

## 2025-05-14 PROCEDURE — 97110 THERAPEUTIC EXERCISES: CPT

## 2025-05-14 PROCEDURE — 97140 MANUAL THERAPY 1/> REGIONS: CPT

## 2025-05-14 PROCEDURE — 97530 THERAPEUTIC ACTIVITIES: CPT

## 2025-05-14 NOTE — PROGRESS NOTES
"Daily Note     Today's date: 2025  Patient name: Neptali Elias  : 1965  MRN: [id number]  Referring provider: Hiro Hunter PA*  Dx:   Encounter Diagnosis     ICD-10-CM    1. Status post total hip replacement, left  Z96.642           Start Time: 1035  Stop Time: 1115  Total time in clinic (min): 40 minutes    Subjective: Pt reports that his hip is \"so-so\" coming into today's session. Pt reported that his job called him and asked to work so he needed to cx a few PT appointments to attend work.       Objective: See treatment diary below      Assessment: Pt tolerated treatment well.Added treadmill walking for warm up during today's session at attempt to improve overall ambulation tolerance, speed, and performance, which he was able to complete full 6' walk with no adverse symptoms. Remainder of PT session was kept at the same intensity as last session due to PT not attending PT for 3 weeks, him being challenged with previous PT visits workout, and slight increased symptom intensity coming into today's session. Pt needed moderate cueing for form corrections, but once pt was cued he was able to complete remaining sets ad reps with proper form. Patient exhibited good technique with therapeutic exercises and would benefit from continued PT      Plan: Continue per plan of care.  Progress treatment as tolerated.       Precautions: s/p anterior L ADDISON on 25.       Date           Visit # IE 2 3          FOTO IE             Re-eval IE               Manuals           Hip PROM NV  PWK          LE Stretching   PWK          Quad/ITB STM NV  PWK                       Neuro Re-Ed           Hamstring Str 2x30\" L            Hip Flexor Str             ITB Str             Bridges 15x3\" GTB            Clamshells 15x 3\" L GTB            Resisted Marching NV            Hamstring Curl NV            TKE             Slantboard             SLS             Ther Ex  " "4/23 5/14          treadmill   6' 1.3 MPH          Bike  6'           Heel Slides 10x3\" L            SLR 10x3\" L            SL Hip Abd 10x3\" L            Leg Press NV 3x10 95# 3x10 95#          SL Leg Press VN 3x10 45# 3x10 65#          Side Stepping NV 5 laps GTB           Monster Walks NV 5 laps GTB           3-way Hip kicks NV 20x ea GTB 20x ea GTB          Split Squats             Ther Activity 4/16 4/23 5/13          Squatting NV 20x 20x          Sit to Stands NV 20x  20x          Lateral Step Ups NV 20x ea 1R 20x ea 1R          Step-Ups NV 20x ea 1R 20x ea 1R          Gait Training 4/16                                      Modalities 4/16                                                "

## 2025-05-16 ENCOUNTER — OFFICE VISIT (OUTPATIENT)
Dept: PHYSICAL THERAPY | Facility: CLINIC | Age: 60
End: 2025-05-16
Attending: PHYSICIAN ASSISTANT
Payer: COMMERCIAL

## 2025-05-16 DIAGNOSIS — Z96.642 STATUS POST TOTAL HIP REPLACEMENT, LEFT: Primary | ICD-10-CM

## 2025-05-16 PROCEDURE — 97140 MANUAL THERAPY 1/> REGIONS: CPT

## 2025-05-16 PROCEDURE — 97112 NEUROMUSCULAR REEDUCATION: CPT

## 2025-05-16 PROCEDURE — 97110 THERAPEUTIC EXERCISES: CPT

## 2025-05-16 NOTE — PROGRESS NOTES
"Daily Note     Today's date: 2025  Patient name: Neptali Elias  : 1965  MRN: [id number]  Referring provider: Hiro Hunter PA*  Dx:   Encounter Diagnosis     ICD-10-CM    1. Status post total hip replacement, left  Z96.642                      Subjective: Pt presents to PT reporting he feels good.  He does report stiffness after prolonged sitting.      Objective: See treatment diary below      Assessment: Pt demonstrates good tolerance to progressions with no complaints.  Pt reports good response to manual therapy noting less hypomobility post PT session. Patient demonstrated fatigue post treatment, exhibited good technique with therapeutic exercises, and would benefit from continued PT to increase flexibility, strength and function.      Plan: Continue per plan of care.      Precautions: s/p anterior L ADDISON on 25.       Date          Visit # IE 2 3 4         FOTO IE             Re-eval IE               Manuals          Hip PROM NV  PWK PK         LE Stretching   PWK PK         Quad/ITB STM NV  PWK PK                      Neuro Re-Ed          Hamstring Str 2x30\" L            Hip Flexor Str             ITB Str             Bridges 15x3\" GTB            Clamshells 15x 3\" L GTB            Resisted Marching NV            Hamstring Curl NV            TKE             Slantboard             SLS             Ther Ex          treadmill   6' 1.3 MPH 6' 1.3 MPH         Bike  6'           Heel Slides 10x3\" L            SLR 10x3\" L            SL Hip Abd 10x3\" L            Leg Press NV 3x10 95# 3x10 95# 3x10 105#         SL Leg Press VN 3x10 45# 3x10 65# 3x10 75#         Side Stepping NV 5 laps GTB           Monster Walks NV 5 laps GTB           3-way Hip kicks NV 20x ea GTB 20x ea GTB 20x ea GTB         Split Squats             Ther Activity          Squatting NV 20x 20x 30x         Sit to Stands NV 20x  " 20x 20x         Lateral Step Ups NV 20x ea 1R 20x ea 1R 20x ea 1R         Step-Ups NV 20x ea 1R 20x ea 1R 20x ea 1R         Gait Training 4/16 5/16                                   Modalities 4/16 5/16

## 2025-05-19 ENCOUNTER — OFFICE VISIT (OUTPATIENT)
Dept: PHYSICAL THERAPY | Facility: CLINIC | Age: 60
End: 2025-05-19
Attending: PHYSICIAN ASSISTANT
Payer: COMMERCIAL

## 2025-05-19 ENCOUNTER — RESULTS FOLLOW-UP (OUTPATIENT)
Dept: FAMILY MEDICINE CLINIC | Facility: CLINIC | Age: 60
End: 2025-05-19

## 2025-05-19 DIAGNOSIS — Z96.642 STATUS POST TOTAL HIP REPLACEMENT, LEFT: Primary | ICD-10-CM

## 2025-05-19 PROCEDURE — 97530 THERAPEUTIC ACTIVITIES: CPT

## 2025-05-19 PROCEDURE — 97140 MANUAL THERAPY 1/> REGIONS: CPT

## 2025-05-19 PROCEDURE — 97110 THERAPEUTIC EXERCISES: CPT

## 2025-05-19 NOTE — PROGRESS NOTES
"Daily Note     Today's date: 2025  Patient name: Neptali Elias  : 1965  MRN: [id number]  Referring provider: Hiro Hunter PA*  Dx:   Encounter Diagnosis     ICD-10-CM    1. Status post total hip replacement, left  Z96.642           Start Time: 935  Stop Time: 1015  Total time in clinic (min): 40 minutes    Subjective: Pt reports that he is feeling better overall, with minimal to no pain or discomfort noted       Objective: See treatment diary below      Assessment: Pt tolerated treatment well. Continued to progress both resistance and duration of exercises during today PT session in order to improve the strength, endurance,and tolerance to activities globally throughout targeted muscle groups. Pt was challenged with several of the progressions made during the session, but was able to maintain proper form and have no exacerbation of symptoms throughout all sets and reps. Continue to progress pt in order to move forward towards reaching previously set goals. Patient exhibited good technique with therapeutic exercises and would benefit from continued PT      Plan: Continue per plan of care.  Progress treatment as tolerated.       Precautions: s/p anterior L ADDISON on 25.       Date         Visit # IE 2 3 4 5        FOTO - DC IE    DC        Re-eval IE               Manuals         Hip PROM NV  PWK PK PWK        LE Stretching   PWK PK PWK        Quad/ITB STM NV  PWK PK PWK                     Neuro Re-Ed         Hamstring Str 2x30\" L            Hip Flexor Str             ITB Str             Bridges 15x3\" GTB            Clamshells 15x 3\" L GTB            Resisted Marching NV            Hamstring Curl NV            TKE             Slantboard             SLS             Ther Ex         treadmill   6' 1.3 MPH 6' 1.3 MPH 6' 1.6 MPH        Bike  6'           Heel Slides 10x3\" L            SLR 10x3\" " "L            SL Hip Abd 10x3\" L            Leg Press NV 3x10 95# 3x10 95# 3x10 105# 3x10  135#        SL Leg Press VN 3x10 45# 3x10 65# 3x10 75# 3x10 85# L        Side Stepping NV 5 laps GTB   5 laps BTB        Monster Walks NV 5 laps GTB   5 laps BTB        3-way Hip kicks NV 20x ea GTB 20x ea GTB 20x ea GTB 20x e aBTB        Split Squats             Ther Activity 4/16 4/23 5/13 5/16 5/19        Squatting NV 20x 20x 30x 30x        Sit to Stands NV 20x  20x 20x         Lateral Step Ups NV 20x ea 1R 20x ea 1R 20x ea 1R 20x ea 2R        Step-Ups NV 20x ea 1R 20x ea 1R 20x ea 1R 20x ea 2R        Gait Training 4/16 5/16                                   Modalities 4/16 5/16                                                 "

## 2025-05-23 ENCOUNTER — OFFICE VISIT (OUTPATIENT)
Dept: PHYSICAL THERAPY | Facility: CLINIC | Age: 60
End: 2025-05-23
Attending: PHYSICIAN ASSISTANT
Payer: COMMERCIAL

## 2025-05-23 DIAGNOSIS — Z96.642 STATUS POST TOTAL HIP REPLACEMENT, LEFT: Primary | ICD-10-CM

## 2025-05-23 PROCEDURE — 97110 THERAPEUTIC EXERCISES: CPT

## 2025-05-23 PROCEDURE — 97112 NEUROMUSCULAR REEDUCATION: CPT

## 2025-05-23 PROCEDURE — 97530 THERAPEUTIC ACTIVITIES: CPT

## 2025-05-23 NOTE — PROGRESS NOTES
"Daily Note     Today's date: 2025  Patient name: Neptali Elias  : 1965  MRN: [id number]  Referring provider: Hiro Hunter PA*  Dx:   Encounter Diagnosis     ICD-10-CM    1. Status post total hip replacement, left  Z96.642           Start Time: 1000  Stop Time: 1055  Total time in clinic (min): 55 minutes    Subjective: Pt reports that he was sore after last session, but that post sessio soreness has since decreased. Pt noted that he is still having increased tension and tightness in his hip.      Objective: See treatment diary below      Assessment: Pt tolerated treatment well. Progressed warm up on treadmill from 6' and 1.6 MPH to 8' and 1.7 MPH to improve the overall tolerance to ambulation and improving overall musculature endurance. Added resisted marching, hamstring curls, and TKE to improve the overall strength and endurance of all hip, LE, and posterior chain musculature globally. Patient exhibited good technique with therapeutic exercises and would benefit from continued PT      Plan: Continue per plan of care.  Progress treatment as tolerated.       Precautions: s/p anterior L ADDISON on 25.       Date        Visit # IE 2 3 4 5 6       FOTO - DC IE    DC        Re-eval IE               Manuals        Hip PROM NV  PWK PK PWK NV       LE Stretching   PWK PK PWK NV       Quad/ITB STM NV  PWK PK PWK NV                    Neuro Re-Ed        Hamstring Str 2x30\" L            Hip Flexor Str             ITB Str             Bridges 15x3\" GTB            Clamshells 15x 3\" L GTB            Sitck Roller      2' quad + ITB       Resisted Marching NV     30x BTB       Hamstring Curl NV     30x BTB       TKE      20x3\" yellow ball       Slantboard             SLS             Ther Ex        treadmill   6' 1.3 MPH 6' 1.3 MPH 6' 1.6 MPH 8' 1.7 MPH       Bike  6'           Heel " "Slides 10x3\" L            SLR 10x3\" L            SL Hip Abd 10x3\" L            Leg Press NV 3x10 95# 3x10 95# 3x10 105# 3x10  135# 3x10 135#       SL Leg Press VN 3x10 45# 3x10 65# 3x10 75# 3x10 85# L 3x10 85# L       Side Stepping NV 5 laps GTB   5 laps BTB 5 laps BTB       Monster Walks NV 5 laps GTB   5 laps BTB 5 laps BTB       3-way Hip kicks NV 20x ea GTB 20x ea GTB 20x ea GTB 20x e aBTB 20x ea BTB       Split Squats             Ther Activity 4/16 4/23 5/13 5/16 5/19 5/23       Squatting NV 20x 20x 30x 30x 30x       Sit to Stands NV 20x  20x 20x         Lateral Step Ups NV 20x ea 1R 20x ea 1R 20x ea 1R 20x ea 2R 20x ea 2R       Step-Ups NV 20x ea 1R 20x ea 1R 20x ea 1R 20x ea 2R 20x ea 2R       Gait Training 4/16 5/16                                   Modalities 4/16 5/16                                                   "

## 2025-06-02 ENCOUNTER — OFFICE VISIT (OUTPATIENT)
Dept: PHYSICAL THERAPY | Facility: CLINIC | Age: 60
End: 2025-06-02
Attending: PHYSICIAN ASSISTANT
Payer: COMMERCIAL

## 2025-06-02 DIAGNOSIS — Z96.642 STATUS POST TOTAL HIP REPLACEMENT, LEFT: Primary | ICD-10-CM

## 2025-06-02 PROCEDURE — 97112 NEUROMUSCULAR REEDUCATION: CPT

## 2025-06-02 PROCEDURE — 97110 THERAPEUTIC EXERCISES: CPT

## 2025-06-02 PROCEDURE — 97164 PT RE-EVAL EST PLAN CARE: CPT

## 2025-06-02 PROCEDURE — 97530 THERAPEUTIC ACTIVITIES: CPT

## 2025-06-02 NOTE — PROGRESS NOTES
PT Evaluation     Today's date: 2025  Patient name: Neptali Elias  : 1965  MRN: [id number]  Referring provider: Hiro Hunter PA*  Dx:   Encounter Diagnosis     ICD-10-CM    1. Status post total hip replacement, left  Z96.642           Start Time: 1628          Assessment  Impairments: abnormal muscle firing, abnormal or restricted ROM, activity intolerance, impaired physical strength, lacks appropriate home exercise program, pain with function and poor body mechanics  Symptom irritability: low    Assessment details: Neptali presents to therapy with L hip weakness secondary to ADDISON. Overall improved ROM by ~ 10 degrees in each plane continues to demonstrate AROM deficits as compared to the R side. MMT improved throughout averaging a 4/5 with most deific ts in abduction and rotation. Emphasized performance of HEP at home to facilitate continued  strength gains with focus on prolonged hold and muscle activation.  Tolerated session without adverse effects. Recommend continued skilled therapy to improve overall strength and mobility for functional return with decreased compensation and pain.   Patient in agreement.   Understanding of Dx/Px/POC: good     Prognosis: good    Goals  ST. Pt will be independent with HEP.- HEP   2. Pt will improve left hip mobility deficits by 50% - PROGRESSING   3. Pt will improve FOTO score from baseline set during initial evaluation- MET  LT. Pt will be able to squat 10 times with proper form and no increase in symptoms- PROGRESSING form   2. Pt will improve left hip strength grossly by 2 grades or more- PROGRESSING   3. Pt will be able to transfer from sitting to standing and walk immediatly after without increase in symptoms- PROGRESSING stiffness continues  4. Pt will reach FOTO goal set by howie during initial evaluation - MET     Plan  Patient would benefit from: PT eval and skilled physical therapy  Referral necessary: No  Planned modality  interventions: biofeedback, manual electrical stimulation, microcurrent electrical stimulation, TENS, electrical stimulation/Russian stimulation, thermotherapy: hydrocollator packs, cryotherapy and unattended electrical stimulation    Planned therapy interventions: abdominal trunk stabilization, joint mobilization, manual therapy, massage, ADL retraining, neuromuscular re-education, body mechanics training, patient education, postural training, strengthening, stretching, therapeutic activities, therapeutic exercise, flexibility, functional ROM exercises and home exercise program    Frequency: 2x week  Duration in weeks: 6  Treatment plan discussed with: patient        Subjective Evaluation    History of Present Illness  Mechanism of injury: 25: Pt reports overall improvement in pain and mobility. Notes limitation after sitting for 30 minutes when going to stand due to pain and stiff. Reports not feeling weakness overall but reports noting continued strength deficits.       Pt is a 59 y.o. male presenting with chronic right hip pain following ADDISON on 2025. Pt was coming to PT for the first month after surgery, but due to a death in his family he needed to stop.Pt denies that he has been having any pain, just difficulty with several movements. Pt reports that transferring from sitting to standing, walking, squatting, lifting, and prolonged weight barring tasks are the most difficult movements for them to perfrom.Pt stated that their main goals for therapy are to improve function with transferring from sitting to standing, walking, squatting, lifting, and prolonged weight barring tasks.  Quality of life: good    Patient Goals  Patient goals for therapy: decreased edema, decreased pain, improved balance, increased motion, increased strength and independence with ADLs/IADLs    Pain  Current pain ratin  At best pain ratin  At worst pain ratin  Aggravating factors: walking and lifting          Objective      Active Range of Motion   Left Hip   Flexion: 110 degrees   Abduction: 30 degrees   External rotation (90/90): 35 degrees   Internal rotation (90/90): 40 degrees     Right Hip   Flexion: 115 degrees   Abduction: 30 degrees   External rotation (90/90): 45 degrees   Internal rotation (90/90): 35 degrees     Joint Play   Left Hip   Hypomobile in the posterior hip capsule, anterior hip capsule and lateral hip capsule.    Right Hip   Joints within functional limits are the posterior hip capsule, anterior hip capsule, long axis distraction and long axis distraction.     Strength/Myotome Testing     Left Hip   Planes of Motion   Flexion: 4  Extension: 4-  Abduction: 4  External rotation: 4  Internal rotation: 4    Right Hip   Planes of Motion   Flexion: 5  Extension: 5  Abduction: 5  External rotation: 5  Internal rotation: 5    Left Knee   Flexion: 4  Extension: 4    Right Knee   Flexion: 5  Extension: 5    Left Ankle/Foot   Dorsiflexion: 5  Plantar flexion: 5    Right Ankle/Foot   Dorsiflexion: 5  Plantar flexion: 5    Tests     Lumbar     Left   Negative crossed SLR and passive SLR.     Right   Negative crossed SLR, passive SLR and quadrant.     Right Pelvic Girdle/Sacrum   Negative: thigh thrust.     Right Hip   Negative YARA and FADIR.     General Comments:      Hip Comments   Gait: improved speed decreased step length on the L LE   Squatting: weight slightly forward, good depth . Mild increased weight on the R LE   Increased tension in left hamstring, quad, ITB, and piriformis- reduced   Good activation of b/l quad             Precautions: s/p anterior L ADDISON on 2/4/25.       Date 4/16 4/23 5/14 5/16 5/19 5/23 6/2      Visit # IE 2 3 4 5 6 7      FOTO - DC IE    DC        Re-eval IE               Manuals 4/16 4/23 5/14 5/16 5/19 5/23 6/2      Hip PROM NV  PWK PK PWK NV       LE Stretching   PWK PK PWK NV       Quad/ITB STM NV  PWK PK PWK NV                    Neuro Re-Ed 4/16 4/23 5/14 5/16 5/19 5/23 6/2     "  Hamstring Str 2x30\" L            Hip Flexor Str             ITB Str             Bridges 15x3\" GTB      5\" x 20 L leg back       Clamshells 15x 3\" L GTB      5\" x 20 ea       Sitck Roller      2' quad + ITB       Resisted Marching NV     30x BTB nv      Hamstring Curl NV     30x BTB X10 on ball       TKE      20x3\" yellow ball       Slantboard             SLS             Ther Ex 4/16 4/23 5/14 5/16 5/19 5/23 6/2      treadmill   6' 1.3 MPH 6' 1.3 MPH 6' 1.6 MPH 8' 1.7 MPH 8' 1.7 MPH      Bike  6'           Heel Slides 10x3\" L            SLR 10x3\" L            SL Hip Abd 10x3\" L            Leg Press NV 3x10 95# 3x10 95# 3x10 105# 3x10  135# 3x10 135# 3x10 145#       SL Leg Press VN 3x10 45# 3x10 65# 3x10 75# 3x10 85# L 3x10 85# L 3x10 85# L      Side Stepping NV 5 laps GTB   5 laps BTB 5 laps BTB 5 laps BTB       Monster Walks NV 5 laps GTB   5 laps BTB 5 laps BTB 5 laps BTB       3-way Hip kicks NV 20x ea GTB 20x ea GTB 20x ea GTB 20x e aBTB 20x ea BTB 30x BTB ea       Split Squats             Ther Activity 4/16 4/23 5/13 5/16 5/19 5/23 6/2      Squatting NV 20x 20x 30x 30x 30x nv      Sit to Stands NV 20x  20x 20x         Lateral Step Ups NV 20x ea 1R 20x ea 1R 20x ea 1R 20x ea 2R 20x ea 2R nv      Step-Ups NV 20x ea 1R 20x ea 1R 20x ea 1R 20x ea 2R 20x ea 2R nv      Gait Training 4/16 5/16                                   Modalities 4/16 5/16                                                   "

## 2025-06-02 NOTE — LETTER
2025    Hiro Hunter PA-C  [address] [place] [id number]    Patient: Neptali Elias   YOB: 1965   Date of Visit: 2025     Encounter Diagnosis     ICD-10-CM    1. Status post total hip replacement, left  Z96.642           Dear Dr. Hiro Hunter PA-C:    Thank you for your recent referral of Neptali Elias. Please review the attached evaluation summary from Neptali's recent visit.     Please verify that you agree with the plan of care by signing the attached order.     If you have any questions or concerns, please do not hesitate to call.     I sincerely appreciate the opportunity to share in the care of one of your patients and hope to have another opportunity to work with you in the near future.       Sincerely,    Reba Maurice, PT      Referring Provider:      I certify that I have read the below Plan of Care and certify the need for these services furnished under this plan of treatment while under my care.                    Hiro Hunter PA-C  [address] [place] [id number]  Via In Basket          PT Evaluation     Today's date: 2025  Patient name: Neptali Elias  : 1965  MRN: [id number]  Referring provider: Hiro Hunter PA*  Dx:   Encounter Diagnosis     ICD-10-CM    1. Status post total hip replacement, left  Z96.642           Start Time: 1628          Assessment  Impairments: abnormal muscle firing, abnormal or restricted ROM, activity intolerance, impaired physical strength, lacks appropriate home exercise program, pain with function and poor body mechanics  Symptom irritability: low    Assessment details: Neptali presents to therapy with L hip weakness secondary to ADDISON. Overall improved ROM by ~ 10 degrees in each plane continues to demonstrate AROM deficits as compared to the R side. MMT improved throughout averaging a 4/5 with most deific ts in abduction and rotation.  Emphasized performance of HEP at home to facilitate continued  strength gains with focus on prolonged hold and muscle activation.  Tolerated session without adverse effects. Recommend continued skilled therapy to improve overall strength and mobility for functional return with decreased compensation and pain.   Patient in agreement.   Understanding of Dx/Px/POC: good     Prognosis: good    Goals  ST. Pt will be independent with HEP.- HEP   2. Pt will improve left hip mobility deficits by 50% - PROGRESSING   3. Pt will improve FOTO score from baseline set during initial evaluation- MET  LT. Pt will be able to squat 10 times with proper form and no increase in symptoms- PROGRESSING form   2. Pt will improve left hip strength grossly by 2 grades or more- PROGRESSING   3. Pt will be able to transfer from sitting to standing and walk immediatly after without increase in symptoms- PROGRESSING stiffness continues  4. Pt will reach FOTO goal set by howie during initial evaluation - MET     Plan  Patient would benefit from: PT eval and skilled physical therapy  Referral necessary: No  Planned modality interventions: biofeedback, manual electrical stimulation, microcurrent electrical stimulation, TENS, electrical stimulation/Russian stimulation, thermotherapy: hydrocollator packs, cryotherapy and unattended electrical stimulation    Planned therapy interventions: abdominal trunk stabilization, joint mobilization, manual therapy, massage, ADL retraining, neuromuscular re-education, body mechanics training, patient education, postural training, strengthening, stretching, therapeutic activities, therapeutic exercise, flexibility, functional ROM exercises and home exercise program    Frequency: 2x week  Duration in weeks: 6  Treatment plan discussed with: patient        Subjective Evaluation    History of Present Illness  Mechanism of injury: 25: Pt reports overall improvement in pain and mobility. Notes limitation  after sitting for 30 minutes when going to stand due to pain and stiff. Reports not feeling weakness overall but reports noting continued strength deficits.       Pt is a 59 y.o. male presenting with chronic right hip pain following ADDISON on 2025. Pt was coming to PT for the first month after surgery, but due to a death in his family he needed to stop.Pt denies that he has been having any pain, just difficulty with several movements. Pt reports that transferring from sitting to standing, walking, squatting, lifting, and prolonged weight barring tasks are the most difficult movements for them to perfrom.Pt stated that their main goals for therapy are to improve function with transferring from sitting to standing, walking, squatting, lifting, and prolonged weight barring tasks.  Quality of life: good    Patient Goals  Patient goals for therapy: decreased edema, decreased pain, improved balance, increased motion, increased strength and independence with ADLs/IADLs    Pain  Current pain ratin  At best pain ratin  At worst pain ratin  Aggravating factors: walking and lifting          Objective     Active Range of Motion   Left Hip   Flexion: 110 degrees   Abduction: 30 degrees   External rotation (90/90): 35 degrees   Internal rotation (90/90): 40 degrees     Right Hip   Flexion: 115 degrees   Abduction: 30 degrees   External rotation (90/90): 45 degrees   Internal rotation (90/90): 35 degrees     Joint Play   Left Hip   Hypomobile in the posterior hip capsule, anterior hip capsule and lateral hip capsule.    Right Hip   Joints within functional limits are the posterior hip capsule, anterior hip capsule, long axis distraction and long axis distraction.     Strength/Myotome Testing     Left Hip   Planes of Motion   Flexion: 4  Extension: 4-  Abduction: 4  External rotation: 4  Internal rotation: 4    Right Hip   Planes of Motion   Flexion: 5  Extension: 5  Abduction: 5  External rotation: 5  Internal  "rotation: 5    Left Knee   Flexion: 4  Extension: 4    Right Knee   Flexion: 5  Extension: 5    Left Ankle/Foot   Dorsiflexion: 5  Plantar flexion: 5    Right Ankle/Foot   Dorsiflexion: 5  Plantar flexion: 5    Tests     Lumbar     Left   Negative crossed SLR and passive SLR.     Right   Negative crossed SLR, passive SLR and quadrant.     Right Pelvic Girdle/Sacrum   Negative: thigh thrust.     Right Hip   Negative YARA and FADIR.     General Comments:      Hip Comments   Gait: improved speed decreased step length on the L LE   Squatting: weight slightly forward, good depth . Mild increased weight on the R LE   Increased tension in left hamstring, quad, ITB, and piriformis- reduced   Good activation of b/l quad             Precautions: s/p anterior L ADDISON on 2/4/25.       Date 4/16 4/23 5/14 5/16 5/19 5/23 6/2      Visit # IE 2 3 4 5 6 7      FOTO - DC IE    DC        Re-eval IE               Manuals 4/16 4/23 5/14 5/16 5/19 5/23 6/2      Hip PROM NV  PWK PK PWK NV       LE Stretching   PWK PK PWK NV       Quad/ITB STM NV  PWK PK PWK NV                    Neuro Re-Ed 4/16 4/23 5/14 5/16 5/19 5/23 6/2      Hamstring Str 2x30\" L            Hip Flexor Str             ITB Str             Bridges 15x3\" GTB      5\" x 20 L leg back       Clamshells 15x 3\" L GTB      5\" x 20 ea       Sitck Roller      2' quad + ITB       Resisted Marching NV     30x BTB nv      Hamstring Curl NV     30x BTB X10 on ball       TKE      20x3\" yellow ball       Slantboard             SLS             Ther Ex 4/16 4/23 5/14 5/16 5/19 5/23 6/2      treadmill   6' 1.3 MPH 6' 1.3 MPH 6' 1.6 MPH 8' 1.7 MPH 8' 1.7 MPH      Bike  6'           Heel Slides 10x3\" L            SLR 10x3\" L            SL Hip Abd 10x3\" L            Leg Press NV 3x10 95# 3x10 95# 3x10 105# 3x10  135# 3x10 135# 3x10 145#       SL Leg Press VN 3x10 45# 3x10 65# 3x10 75# 3x10 85# L 3x10 85# L 3x10 85# L      Side Stepping NV 5 laps GTB   5 laps BTB 5 laps BTB 5 laps BTB     "   Monster Walks NV 5 laps GTB   5 laps BTB 5 laps BTB 5 laps BTB       3-way Hip kicks NV 20x ea GTB 20x ea GTB 20x ea GTB 20x e aBTB 20x ea BTB 30x BTB ea       Split Squats             Ther Activity 4/16 4/23 5/13 5/16 5/19 5/23 6/2      Squatting NV 20x 20x 30x 30x 30x nv      Sit to Stands NV 20x  20x 20x         Lateral Step Ups NV 20x ea 1R 20x ea 1R 20x ea 1R 20x ea 2R 20x ea 2R nv      Step-Ups NV 20x ea 1R 20x ea 1R 20x ea 1R 20x ea 2R 20x ea 2R nv      Gait Training 4/16 5/16                                   Modalities 4/16 5/16

## 2025-06-04 ENCOUNTER — APPOINTMENT (OUTPATIENT)
Dept: PHYSICAL THERAPY | Facility: CLINIC | Age: 60
End: 2025-06-04
Attending: PHYSICIAN ASSISTANT
Payer: COMMERCIAL

## 2025-06-04 NOTE — PROGRESS NOTES
"Daily Note     Today's date: 2025  Patient name: Neptali Elias  : 1965  MRN: [id number]  Referring provider: Hiro Hunter PA*  Dx: No diagnosis found.               Subjective: \"      Objective: See treatment diary below      Assessment: Neptali presents to therapy with hip pain. Tolerated session without adverse effects. Recommend continued skilled therapy to improve overall strength and mobility for functional return with decreased compensation and pain.        Plan: Continue per plan of care.  Progress treatment as tolerated.       Precautions: s/p anterior L ADDISON on 25.       Date      Visit # IE 2 3 4 5 6 7 8     FOTO - DC IE    DC        Re-eval IE               Manuals      Hip PROM NV  PWK PK PWK NV       LE Stretching   PWK PK PWK NV       Quad/ITB STM NV  PWK PK PWK NV                    Neuro Re-Ed      Hamstring Str 2x30\" L            Hip Flexor Str             ITB Str             Bridges 15x3\" GTB      5\" x 20 L leg back       Clamshells 15x 3\" L GTB      5\" x 20 ea       Sitck Roller      2' quad + ITB       Resisted Marching NV     30x BTB nv      Hamstring Curl NV     30x BTB X10 on ball       TKE      20x3\" yellow ball       Slantboard             SLS             Ther Ex  6     treadmill   6' 1.3 MPH 6' 1.3 MPH 6' 1.6 MPH 8' 1.7 MPH 8' 1.7 MPH      Bike  6'           Heel Slides 10x3\" L            SLR 10x3\" L            SL Hip Abd 10x3\" L            Leg Press NV 3x10 95# 3x10 95# 3x10 105# 3x10  135# 3x10 135# 3x10 145#       SL Leg Press VN 3x10 45# 3x10 65# 3x10 75# 3x10 85# L 3x10 85# L 3x10 85# L      Side Stepping NV 5 laps GTB   5 laps BTB 5 laps BTB 5 laps BTB       Monster Walks NV 5 laps GTB   5 laps BTB 5 laps BTB 5 laps BTB       3-way Hip kicks NV 20x ea GTB 20x ea GTB 20x ea GTB 20x e aBTB 20x ea BTB 30x BTB ea  "      Split Squats             Ther Activity 4/16 4/23 5/13 5/16 5/19 5/23 6/2 6/4     Squatting NV 20x 20x 30x 30x 30x nv      Sit to Stands NV 20x  20x 20x         Lateral Step Ups NV 20x ea 1R 20x ea 1R 20x ea 1R 20x ea 2R 20x ea 2R nv      Step-Ups NV 20x ea 1R 20x ea 1R 20x ea 1R 20x ea 2R 20x ea 2R nv      Gait Training 4/16 5/16                                   Modalities 4/16 5/16

## (undated) DEVICE — COBAN 6 IN STERILE

## (undated) DEVICE — WEBRIL 6 IN UNSTERILE

## (undated) DEVICE — SMOKE EVAC FLUID SUCTION HEPA FILTER

## (undated) DEVICE — SKIN MARKER DUAL TIP WITH RULER CAP, FLEXIBLE RULER AND LABELS: Brand: DEVON

## (undated) DEVICE — GLOVE PI ULTRA TOUCH SZ.6.5

## (undated) DEVICE — MAT ABSORBANT ARTHROSCOPY FLOOR 46 X 40 IN

## (undated) DEVICE — DRAPE SHEET THREE QUARTER

## (undated) DEVICE — BASIC SINGLE BASIN 2-LF: Brand: MEDLINE INDUSTRIES, INC.

## (undated) DEVICE — DRESSING MEPILEX AG BORDER POST-OP 4 X 8 IN

## (undated) DEVICE — HANDPIECE SET WITH HIGH FLOW TIP AND SUCTION TUBE: Brand: INTERPULSE

## (undated) DEVICE — CAPIT HIP COP -CMNT/POR-ACTIS

## (undated) DEVICE — SUT VICRYL 1 CTX 36 IN J977H

## (undated) DEVICE — NEPTUNE E-SEP SMOKE EVACUATION PENCIL, COATED, 70MM BLADE, PUSH BUTTON SWITCH: Brand: NEPTUNE E-SEP

## (undated) DEVICE — SMARTSLEEVE SURGICAL GOWN, 2XL LONG: Brand: CONVERTORS

## (undated) DEVICE — NEEDLE 18 G X 1 1/2

## (undated) DEVICE — SUT ETHIBOND 5 V-40 30 IN MB46G

## (undated) DEVICE — 3M™ IOBAN™ 2 ANTIMICROBIAL INCISE DRAPE 6648EZ: Brand: IOBAN™ 2

## (undated) DEVICE — FRAZIER SUCTION INSTRUMENT 18 FR W/OBTURATOR, NO CONTROL VENT: Brand: FRAZIER

## (undated) DEVICE — STERILE POLYISOPRENE POWDER-FREE SURGICAL GLOVES WITH EMOLLIENT COATING: Brand: PROTEXIS

## (undated) DEVICE — 2108 SERIES SAGITTAL BLADE, GROUND (20.5 X 1.27 X 85.0MM)

## (undated) DEVICE — GLOVE PI ULTRA TOUCH SZ.8.0

## (undated) DEVICE — NEPTUNE E-SEP 165MM SUCTION SLEEVE: Brand: NEPTUNE E-SEP

## (undated) DEVICE — EMERALD TOP SHEET DRAPE: Brand: CONVERTORS

## (undated) DEVICE — EXOFIN PRECISION PEN HIGH VISCOSITY TOPICAL SKIN ADHESIVE: Brand: EXOFIN PRECISION PEN, 1G

## (undated) DEVICE — C-ARM: Brand: UNBRANDED

## (undated) DEVICE — POSITIONER HANA TABLE PACK

## (undated) DEVICE — GLOVE INDICATOR PI UNDERGLOVE SZ 6.5 BLUE

## (undated) DEVICE — 3M™ STERI-DRAPE™ U-DRAPE 1015: Brand: STERI-DRAPE™

## (undated) DEVICE — GLOVE SRG BIOGEL PI ORTHOPEDIC 7

## (undated) DEVICE — PAD GROUNDING DUAL ADULT

## (undated) DEVICE — 4-PORT MANIFOLD: Brand: NEPTUNE 2

## (undated) DEVICE — BETHLEHEM TOTAL HIP, KIT: Brand: CARDINAL HEALTH

## (undated) DEVICE — STERILE POLYISOPRENE POWDER-FREE SURGICAL GLOVES: Brand: PROTEXIS

## (undated) DEVICE — BIPOLAR SEALER 23-301-1 AQM MBS: Brand: AQUAMANTYS™

## (undated) DEVICE — GLOVE SRG BIOGEL ORTHOPEDIC 6.5

## (undated) DEVICE — ELECTRODE BLADE E-Z CLEAN 6.5IN -0014

## (undated) DEVICE — 450 ML BOTTLE OF 0.05% CHLORHEXIDINE GLUCONATE IN 99.95% STERILE WATER FOR IRRIGATION, USP AND APPLICATOR.: Brand: IRRISEPT ANTIMICROBIAL WOUND LAVAGE

## (undated) DEVICE — SYRINGE 30ML LL

## (undated) DEVICE — DRAPE EQUIPMENT RF WAND

## (undated) DEVICE — SUT MONOCRYL 3-0 PS-2 27 IN Y427H

## (undated) DEVICE — SUT VICRYL 2-0 CT-1 36 IN J945H

## (undated) DEVICE — CHLORAPREP HI-LITE 26ML ORANGE

## (undated) DEVICE — 3M™ DURAPORE™ SURGICAL TAPE 1538-3, 3 INCH X 10 YARD (7,5CM X 9,1M), 4 ROLLS/BOX: Brand: 3M™ DURAPORE™

## (undated) DEVICE — HOOD: Brand: T7PLUS

## (undated) DEVICE — DUAL CUT SAGITTAL BLADE